# Patient Record
Sex: FEMALE | Race: BLACK OR AFRICAN AMERICAN | Employment: UNEMPLOYED | URBAN - METROPOLITAN AREA
[De-identification: names, ages, dates, MRNs, and addresses within clinical notes are randomized per-mention and may not be internally consistent; named-entity substitution may affect disease eponyms.]

---

## 2020-10-24 ENCOUNTER — HOSPITAL ENCOUNTER (EMERGENCY)
Age: 80
Discharge: HOME | End: 2020-10-24
Attending: EMERGENCY MEDICINE
Payer: MEDICARE

## 2020-10-24 ENCOUNTER — APPOINTMENT (OUTPATIENT)
Dept: GENERAL RADIOLOGY | Age: 80
End: 2020-10-24
Payer: MEDICARE

## 2020-10-24 VITALS
RESPIRATION RATE: 15 BRPM | TEMPERATURE: 97.8 F | HEART RATE: 60 BPM | DIASTOLIC BLOOD PRESSURE: 51 MMHG | SYSTOLIC BLOOD PRESSURE: 127 MMHG | OXYGEN SATURATION: 97 %

## 2020-10-24 LAB
-: ABNORMAL
ABSOLUTE EOS #: 0.1 K/UL (ref 0–0.4)
ABSOLUTE IMMATURE GRANULOCYTE: NORMAL K/UL (ref 0–0.3)
ABSOLUTE LYMPH #: 1.4 K/UL (ref 1–4.8)
ABSOLUTE MONO #: 0.4 K/UL (ref 0.1–1.3)
ALBUMIN SERPL-MCNC: 3.8 G/DL (ref 3.5–5.2)
ALBUMIN/GLOBULIN RATIO: ABNORMAL (ref 1–2.5)
ALP BLD-CCNC: 154 U/L (ref 35–104)
ALT SERPL-CCNC: 9 U/L (ref 5–33)
AMORPHOUS: ABNORMAL
ANION GAP SERPL CALCULATED.3IONS-SCNC: 10 MMOL/L (ref 9–17)
AST SERPL-CCNC: 11 U/L
BACTERIA: ABNORMAL
BASOPHILS # BLD: 0 % (ref 0–2)
BASOPHILS ABSOLUTE: 0 K/UL (ref 0–0.2)
BILIRUB SERPL-MCNC: 0.32 MG/DL (ref 0.3–1.2)
BILIRUBIN URINE: NEGATIVE
BUN BLDV-MCNC: 25 MG/DL (ref 8–23)
BUN/CREAT BLD: ABNORMAL (ref 9–20)
CALCIUM SERPL-MCNC: 9.6 MG/DL (ref 8.6–10.4)
CASTS UA: ABNORMAL /LPF
CHLORIDE BLD-SCNC: 104 MMOL/L (ref 98–107)
CO2: 26 MMOL/L (ref 20–31)
COLOR: YELLOW
COMMENT UA: ABNORMAL
CREAT SERPL-MCNC: 1.02 MG/DL (ref 0.5–0.9)
CRYSTALS, UA: ABNORMAL /HPF
DIFFERENTIAL TYPE: NORMAL
EOSINOPHILS RELATIVE PERCENT: 3 % (ref 0–4)
EPITHELIAL CELLS UA: ABNORMAL /HPF
GFR AFRICAN AMERICAN: >60 ML/MIN
GFR NON-AFRICAN AMERICAN: 52 ML/MIN
GFR SERPL CREATININE-BSD FRML MDRD: ABNORMAL ML/MIN/{1.73_M2}
GFR SERPL CREATININE-BSD FRML MDRD: ABNORMAL ML/MIN/{1.73_M2}
GLUCOSE BLD-MCNC: 419 MG/DL (ref 70–99)
GLUCOSE URINE: ABNORMAL
HCT VFR BLD CALC: 42.9 % (ref 36–46)
HEMOGLOBIN: 14.2 G/DL (ref 12–16)
IMMATURE GRANULOCYTES: NORMAL %
KETONES, URINE: NEGATIVE
LEUKOCYTE ESTERASE, URINE: NEGATIVE
LIPASE: 27 U/L (ref 13–60)
LYMPHOCYTES # BLD: 26 % (ref 24–44)
MCH RBC QN AUTO: 28.7 PG (ref 26–34)
MCHC RBC AUTO-ENTMCNC: 33.2 G/DL (ref 31–37)
MCV RBC AUTO: 86.5 FL (ref 80–100)
MONOCYTES # BLD: 7 % (ref 1–7)
MUCUS: ABNORMAL
NITRITE, URINE: NEGATIVE
NRBC AUTOMATED: NORMAL PER 100 WBC
OTHER OBSERVATIONS UA: ABNORMAL
PDW BLD-RTO: 13.8 % (ref 11.5–14.9)
PH UA: 6 (ref 5–8)
PLATELET # BLD: 236 K/UL (ref 150–450)
PLATELET ESTIMATE: NORMAL
PMV BLD AUTO: 8.5 FL (ref 6–12)
POTASSIUM SERPL-SCNC: 3.8 MMOL/L (ref 3.7–5.3)
PROTEIN UA: ABNORMAL
RBC # BLD: 4.96 M/UL (ref 4–5.2)
RBC # BLD: NORMAL 10*6/UL
RBC UA: ABNORMAL /HPF
RENAL EPITHELIAL, UA: ABNORMAL /HPF
SEG NEUTROPHILS: 64 % (ref 36–66)
SEGMENTED NEUTROPHILS ABSOLUTE COUNT: 3.6 K/UL (ref 1.3–9.1)
SODIUM BLD-SCNC: 140 MMOL/L (ref 135–144)
SPECIFIC GRAVITY UA: 1.02 (ref 1–1.03)
TOTAL PROTEIN: 7.8 G/DL (ref 6.4–8.3)
TRICHOMONAS: ABNORMAL
TROPONIN INTERP: NORMAL
TROPONIN T: NORMAL NG/ML
TROPONIN, HIGH SENSITIVITY: 13 NG/L (ref 0–14)
TURBIDITY: CLEAR
URINE HGB: NEGATIVE
UROBILINOGEN, URINE: NORMAL
WBC # BLD: 5.5 K/UL (ref 3.5–11)
WBC # BLD: NORMAL 10*3/UL
WBC UA: ABNORMAL /HPF
YEAST: ABNORMAL

## 2020-10-24 PROCEDURE — 84484 ASSAY OF TROPONIN QUANT: CPT

## 2020-10-24 PROCEDURE — 36415 COLL VENOUS BLD VENIPUNCTURE: CPT

## 2020-10-24 PROCEDURE — 71045 X-RAY EXAM CHEST 1 VIEW: CPT

## 2020-10-24 PROCEDURE — 93005 ELECTROCARDIOGRAM TRACING: CPT | Performed by: EMERGENCY MEDICINE

## 2020-10-24 PROCEDURE — 80053 COMPREHEN METABOLIC PANEL: CPT

## 2020-10-24 PROCEDURE — 81001 URINALYSIS AUTO W/SCOPE: CPT

## 2020-10-24 PROCEDURE — 85025 COMPLETE CBC W/AUTO DIFF WBC: CPT

## 2020-10-24 PROCEDURE — 99283 EMERGENCY DEPT VISIT LOW MDM: CPT

## 2020-10-24 PROCEDURE — 83690 ASSAY OF LIPASE: CPT

## 2020-10-24 PROCEDURE — 82947 ASSAY GLUCOSE BLOOD QUANT: CPT

## 2020-10-24 RX ORDER — INSULIN LISPRO 100 [IU]/ML
INJECTION, SUSPENSION SUBCUTANEOUS
Qty: 5 PEN | Refills: 0 | Status: SHIPPED | OUTPATIENT
Start: 2020-10-24 | End: 2021-11-04 | Stop reason: DRUGHIGH

## 2020-10-24 ASSESSMENT — ENCOUNTER SYMPTOMS
COLOR CHANGE: 0
SHORTNESS OF BREATH: 0
BACK PAIN: 0
EYE PAIN: 0
ABDOMINAL PAIN: 0

## 2020-10-24 NOTE — ED NOTES
Pt is brought to this ER by her daughter from [place], and the pt states it has been 4 or 5 days since she has had any insulin because she is out and was unable to obtain a refill. Pt states her last FSBS > 400 yesterday at her last check. Pt c/o nausea, lightheadedness, and generalized fatigue. Pt arrives A+O x 4, GCS = 15, PMS x 4 intact, eupneic, and PWD. Pt's pulse is bradycardic and regular which she states is normal \"sometimes. \"       Yolanda Gallardo, BARBARA  10/24/20 6189

## 2020-10-24 NOTE — ED PROVIDER NOTES
EMERGENCY DEPARTMENT ENCOUNTER    Pt Name: Dagmar Baltazar  MRN: [id number]  [place] 9/24/1939  Date of evaluation: 10/24/20  CHIEF COMPLAINT       Chief Complaint   Patient presents with    Dizziness     Pt reports no insulin in 4-5 days    Fatigue     HISTORY OF PRESENT ILLNESS   61-year-old female presents with a complaint of dizziness and general weakness. Patient reports that she has a history of diabetes and recently moved here from [place]. Patient states that she ran out of her insulin approximately 4 days ago and has been without her insulin since then. Patient states that she has not establish care with a doctor in the area and has not been able to get insulin since that time. Patient states that she is feeling lightheaded and overall weak, patient states that this happens when her blood sugar gets high  Patient denies other complaints, denies any chest pain, shortness of breath, fevers, chills, abdominal pain, nausea or vomiting, numbness, tingling or extremity weakness. .  Patient endorsed slight cough which she states is chronic. The history is provided by the patient. REVIEW OF SYSTEMS     Review of Systems   Constitutional: Negative for fever. HENT: Negative for congestion and ear pain. Eyes: Negative for pain. Respiratory: Negative for shortness of breath. Cardiovascular: Negative for chest pain, palpitations and leg swelling. Gastrointestinal: Negative for abdominal pain. Genitourinary: Negative for dysuria and flank pain. Musculoskeletal: Negative for back pain. Skin: Negative for color change. Neurological: Positive for dizziness and light-headedness. Negative for numbness and headaches. Psychiatric/Behavioral: Negative for confusion. All other systems reviewed and are negative.     PASTMEDICAL HISTORY     Past Medical History:   Diagnosis Date    Amputation of leg ([facility] [address]     left aka    CAD (coronary artery disease)     CAD (coronary artery disease)     Chronic back pain     Coughing     Depression     Diabetes     Diphtheria     Full dentures     GERD (gastroesophageal reflux disease) 10/10/2013    Glaucoma     ATMIMKSM(552.2)     History of blood transfusion     Hx of blood clots     Hyperlipidemia     Hypertension     IDDM (insulin dependent diabetes mellitus)     Leg pain, bilateral     LONG TERM ANTICOAGULENT USE     Neuropathy     Numbness of toes     Obesity     Osteoarthritis     Peripheral vascular disease (HCC)     Rash, skin     lt arm    Type II or unspecified type diabetes mellitus without mention of complication, not stated as uncontrolled     Wears dentures     Wears glasses     Wears glasses      Past Problem List  Patient Active Problem List   Diagnosis Code    Depression F32.9    Diabetes mellitus, type 2 E11.9    Glaucoma H40.9    CAD s/p CABG 2001, stent June 2013 I25.10    Hypertension I10    Diabetic nephropathy ([facility] [address] E11.21    Diabetic retinopathy (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 [address] Z89.611, Z89.612    CKD (chronic kidney disease), stage III N18.30     SURGICAL HISTORY       Past Surgical History:   Procedure Laterality Date    BYPASS GRAFT  2008    CATARACT REMOVAL  1980    bilateral    CORONARY ANGIOPLASTY      STENT X2    CORONARY ARTERY BYPASS GRAFT  2001    HYSTERECTOMY, TOTAL ABDOMINAL      LEG AMPUTATION THROUGH KNEE Left      CURRENT MEDICATIONS       Discharge Medication List as of 10/24/2020  1:08 PM      CONTINUE these medications which have NOT CHANGED    Details   insulin detemir (LEVEMIR FLEXTOUCH) 100 UNIT/ML injection pen Inject 45 Units into the skin nightly, Disp-1 Pen,R-3Normal      gabapentin (NEURONTIN) 100 MG capsule take 2 capsules by mouth three times a day, Disp-90 capsule,R-3Normal      B-D UF III MINI PEN NEEDLES 31G X 5 MM MISC Disp-60 each,R-3, Normal      Alcohol Swabs (B-D SINGLE USE SWABS REGULAR) PADS Disp-100 each,R-0, Normal      TRUETRACK TEST strip TEST two to three times a day, Disp-100 each,R-3Normal      TRUEPLUS LANCETS 33G MISC Disp-100 each,R-3, Normal      atorvastatin (LIPITOR) 40 MG tablet Take 1 tablet by mouth daily. , Disp-30 tablet, R-3      ondansetron (ZOFRAN ODT) 4 MG disintegrating tablet Take 1 tablet by mouth every 8 hours as needed for Nausea or Vomiting., Disp-30 tablet, R-0      acetaminophen (TYLENOL 8 HOUR) 650 MG CR tablet Take 1 tablet by mouth every 8 hours as needed for Pain., Disp-90 tablet, R-3      Blood Glucose Monitoring Suppl (BLOOD GLUCOSE METER) KIT Disp-1 kit, R-0, PrintTest 2-3 times daily Dx: 250.00  Diabetes Mellitus 2 Insulin Dependent      oxyCODONE-acetaminophen (PERCOCET) 5-325 MG per tablet Take 1 tablet by mouth every 8 hours as needed for Pain. DC Tylenol, Disp-50 tablet, R-0      aspirin (ASPIRIN LOW DOSE) 81 MG EC tablet Take 1 tablet by mouth daily. , Disp-30 tablet, R-3      citalopram (CELEXA) 20 MG tablet Take 1 tablet by mouth daily. , Disp-30 tablet, R-3      metFORMIN (GLUCOPHAGE) 500 MG tablet Take 1 tablet by mouth 2 times daily (with meals). , Disp-60 tablet, R-3      losartan (COZAAR) 100 MG tablet Take 1 tablet by mouth daily. , Disp-30 tablet, R-3      metoprolol (LOPRESSOR) 25 MG tablet Take 0.5 tablets by mouth 2 times daily. , Disp-60 tablet, R-3      furosemide (LASIX) 20 MG tablet Take 1 tablet by mouth 2 times daily (with meals). , Disp-60 tablet, R-3      sennosides-docusate sodium (SENOKOT-S) 8.6-50 MG tablet Take 1 tablet by mouth 2 times daily. , Disp-60 tablet, R-0Hold for loose stool      clopidogrel (PLAVIX) 75 MG tablet Take 1 tablet by mouth daily. , Disp-30 tablet, R-3      famotidine (PEPCID) 40 MG tablet Take 1 tablet by mouth daily. , Disp-60 tablet, R-3      amLODIPine AKA   Skin:     General: Skin is warm and dry. Capillary Refill: Capillary refill takes less than 2 seconds. Neurological:      General: No focal deficit present. Mental Status: She is alert. Psychiatric:         Mood and Affect: Mood normal.         MEDICAL DECISION MAKIN-year-old female presents with complaint of weakness and lightheadedness. Initial exam patient is in no acute distress, vital signs are stable, will obtain labs    Labs reviewed significant for glucose of 419, remaining labs are unremarkable    Patient was reexamined states she is feeling better, states that she has an appointment with a new PCP this week    And no signs of distress, vitals are stable, no signs of DKA or HHS at this time, will provide patient with prescription for her requested insulin and instruct her to follow-up with her PCP as scheduled    Both patient and daughter are agreeable with this plan and comfortable for discharge home, discussed return precautions including any change or worsening of symptoms, patient and daughter voiced understanding will follow up with the PCP and will return to the ED if her symptoms change or worsen    Patient/Guardian was informed of their diagnosis and told to follow up with PCP in 1-3 days. Patient demonstrates understanding and agreement with the plan. They were given the opportunity to ask questions and those questions were answered to the best of our ability with the available information. Patient/Guardian told to return to the ED for any new, worsening, changing or persistent symptoms. This dictation was prepared using [employer] voice recognition software. As a result, errors may have occurred. When identified, these errors have been corrected.  While every attempt is made to correct errors in dictation, errors may still exist.          CRITICAL CARE:       PROCEDURES:    Procedures    DIAGNOSTIC RESULTS   EKG:All EKG's are interpreted by the Emergency Department Physician who either signs or Co-signs this chart in the absence of a cardiologist.        RADIOLOGY:All plain film, CT, MRI, and formal ultrasound images (except ED bedside ultrasound) are read by the radiologist, see reports below, unless otherwisenoted in MDM or here. XR CHEST PORTABLE   Final Result   No acute pulmonary process. LABS: All lab results were reviewed by myself, and all abnormals are listed below. Labs Reviewed   COMPREHENSIVE METABOLIC PANEL W/ REFLEX TO MG FOR LOW K - Abnormal; Notable for the following components:       Result Value    Glucose 419 (*)     BUN 25 (*)     CREATININE 1.02 (*)     Alkaline Phosphatase 154 (*)     GFR Non- 52 (*)     All other components within normal limits   URINALYSIS - Abnormal; Notable for the following components:    Glucose, Ur 3+ (*)     Protein, UA TRACE (*)     All other components within normal limits   MICROSCOPIC URINALYSIS - Abnormal; Notable for the following components:    Bacteria, UA FEW (*)     All other components within normal limits   CBC WITH AUTO DIFFERENTIAL   LIPASE   TROPONIN       EMERGENCY DEPARTMENTCOURSE:         Vitals:    Vitals:    10/24/20 0954 10/24/20 1055   BP: (!) 169/61 (!) 127/51   Pulse: 59 60   Resp: 14 15   Temp: 97.8 °F (36.6 °C)    TempSrc: Oral    SpO2: 98% 97%       The patient was given the following medications while in the emergency department:  Orders Placed This Encounter   Medications    insulin lispro protamine & lispro (HUMALOG MIX 75/25 KWIKPEN) (75-25) 100 UNIT per ML SUPN injection pen     Sig: Inject 45 units under the skin in the morning and 35 units at night     Dispense:  5 pen     Refill:  0     CONSULTS:  None    FINAL IMPRESSION      1. Hyperglycemia    2.  Diabetes mellitus, type 2          DISPOSITION/PLAN   DISPOSITION Decision To Discharge 10/24/2020 01:08:34 PM      PATIENT REFERRED TO:  [address] [facility] ED  [facility] [address]  [phone]    As needed, If symptoms worsen    Your PCP    Go to   Go to your scheduled appointment    DISCHARGE MEDICATIONS:  Discharge Medication List as of 10/24/2020  1:08 PM      START taking these medications    Details   insulin lispro protamine & lispro (HUMALOG MIX 75/25 KWIKPEN) (75-25) 100 UNIT per ML SUPN injection pen Inject 45 units under the skin in the morning and 35 units at night, Disp-5 pen,R-0Print           Sandra Ramírez DO  Attending Emergency Physician                  Sandra Ramírez DO  10/24/20 7461

## 2020-10-26 LAB — GLUCOSE BLD-MCNC: 327 MG/DL (ref 65–105)

## 2020-10-27 LAB
EKG ATRIAL RATE: 57 BPM
EKG P AXIS: 39 DEGREES
EKG P-R INTERVAL: 194 MS
EKG Q-T INTERVAL: 436 MS
EKG QRS DURATION: 88 MS
EKG QTC CALCULATION (BAZETT): 424 MS
EKG R AXIS: -19 DEGREES
EKG T AXIS: 2 DEGREES
EKG VENTRICULAR RATE: 57 BPM

## 2020-10-27 PROCEDURE — 93010 ELECTROCARDIOGRAM REPORT: CPT | Performed by: INTERNAL MEDICINE

## 2021-10-21 ENCOUNTER — HOSPITAL ENCOUNTER (INPATIENT)
Age: 81
LOS: 4 days | Discharge: HOME | DRG: 687 | End: 2021-10-25
Attending: EMERGENCY MEDICINE | Admitting: INTERNAL MEDICINE
Payer: MEDICARE

## 2021-10-21 ENCOUNTER — APPOINTMENT (OUTPATIENT)
Dept: CT IMAGING | Age: 81
DRG: 687 | End: 2021-10-21
Payer: MEDICARE

## 2021-10-21 DIAGNOSIS — Z89.611 S/P AKA (ABOVE KNEE AMPUTATION) BILATERAL (HCC): ICD-10-CM

## 2021-10-21 DIAGNOSIS — Z89.612 S/P AKA (ABOVE KNEE AMPUTATION) BILATERAL (HCC): ICD-10-CM

## 2021-10-21 DIAGNOSIS — R10.9 ABDOMINAL PAIN: ICD-10-CM

## 2021-10-21 DIAGNOSIS — N28.89 LEFT RENAL MASS: Primary | ICD-10-CM

## 2021-10-21 LAB
-: ABNORMAL
ABSOLUTE EOS #: 0.1 K/UL (ref 0–0.4)
ABSOLUTE IMMATURE GRANULOCYTE: NORMAL K/UL (ref 0–0.3)
ABSOLUTE LYMPH #: 1.7 K/UL (ref 1–4.8)
ABSOLUTE MONO #: 0.4 K/UL (ref 0.1–1.3)
AMORPHOUS: ABNORMAL
ANION GAP SERPL CALCULATED.3IONS-SCNC: 12 MMOL/L (ref 9–17)
BACTERIA: ABNORMAL
BASOPHILS # BLD: 1 % (ref 0–2)
BASOPHILS ABSOLUTE: 0 K/UL (ref 0–0.2)
BILIRUBIN URINE: NEGATIVE
BUN BLDV-MCNC: 24 MG/DL (ref 8–23)
BUN/CREAT BLD: ABNORMAL (ref 9–20)
CALCIUM SERPL-MCNC: 10.6 MG/DL (ref 8.6–10.4)
CASTS UA: ABNORMAL /LPF
CHLORIDE BLD-SCNC: 105 MMOL/L (ref 98–107)
CO2: 27 MMOL/L (ref 20–31)
COLOR: YELLOW
COMMENT UA: ABNORMAL
CREAT SERPL-MCNC: 0.94 MG/DL (ref 0.5–0.9)
CRYSTALS, UA: ABNORMAL /HPF
DIFFERENTIAL TYPE: NORMAL
EOSINOPHILS RELATIVE PERCENT: 2 % (ref 0–4)
EPITHELIAL CELLS UA: ABNORMAL /HPF
GFR AFRICAN AMERICAN: >60 ML/MIN
GFR NON-AFRICAN AMERICAN: 57 ML/MIN
GFR SERPL CREATININE-BSD FRML MDRD: ABNORMAL ML/MIN/{1.73_M2}
GFR SERPL CREATININE-BSD FRML MDRD: ABNORMAL ML/MIN/{1.73_M2}
GLUCOSE BLD-MCNC: 120 MG/DL (ref 70–99)
GLUCOSE BLD-MCNC: 69 MG/DL (ref 65–105)
GLUCOSE URINE: NEGATIVE
HCT VFR BLD CALC: 43.2 % (ref 36–46)
HEMOGLOBIN: 14.4 G/DL (ref 12–16)
IMMATURE GRANULOCYTES: NORMAL %
KETONES, URINE: NEGATIVE
LEUKOCYTE ESTERASE, URINE: NEGATIVE
LYMPHOCYTES # BLD: 27 % (ref 24–44)
MCH RBC QN AUTO: 29.2 PG (ref 26–34)
MCHC RBC AUTO-ENTMCNC: 33.3 G/DL (ref 31–37)
MCV RBC AUTO: 87.6 FL (ref 80–100)
MONOCYTES # BLD: 6 % (ref 1–7)
MUCUS: ABNORMAL
NITRITE, URINE: NEGATIVE
NRBC AUTOMATED: NORMAL PER 100 WBC
OTHER OBSERVATIONS UA: ABNORMAL
PDW BLD-RTO: 13.5 % (ref 11.5–14.9)
PH UA: 5.5 (ref 5–8)
PLATELET # BLD: 218 K/UL (ref 150–450)
PLATELET ESTIMATE: NORMAL
PMV BLD AUTO: 8.3 FL (ref 6–12)
POTASSIUM SERPL-SCNC: 3.7 MMOL/L (ref 3.7–5.3)
PROTEIN UA: ABNORMAL
RBC # BLD: 4.93 M/UL (ref 4–5.2)
RBC # BLD: NORMAL 10*6/UL
RBC UA: ABNORMAL /HPF
RENAL EPITHELIAL, UA: ABNORMAL /HPF
SEG NEUTROPHILS: 64 % (ref 36–66)
SEGMENTED NEUTROPHILS ABSOLUTE COUNT: 4.1 K/UL (ref 1.3–9.1)
SODIUM BLD-SCNC: 144 MMOL/L (ref 135–144)
SPECIFIC GRAVITY UA: 1.02 (ref 1–1.03)
TRICHOMONAS: ABNORMAL
TURBIDITY: CLEAR
URINE HGB: NEGATIVE
UROBILINOGEN, URINE: NORMAL
WBC # BLD: 6.5 K/UL (ref 3.5–11)
WBC # BLD: NORMAL 10*3/UL
WBC UA: ABNORMAL /HPF
YEAST: ABNORMAL

## 2021-10-21 PROCEDURE — 80048 BASIC METABOLIC PNL TOTAL CA: CPT

## 2021-10-21 PROCEDURE — 2580000003 HC RX 258: Performed by: NURSE PRACTITIONER

## 2021-10-21 PROCEDURE — 36415 COLL VENOUS BLD VENIPUNCTURE: CPT

## 2021-10-21 PROCEDURE — 96374 THER/PROPH/DIAG INJ IV PUSH: CPT

## 2021-10-21 PROCEDURE — 85025 COMPLETE CBC W/AUTO DIFF WBC: CPT

## 2021-10-21 PROCEDURE — 99284 EMERGENCY DEPT VISIT MOD MDM: CPT

## 2021-10-21 PROCEDURE — 96375 TX/PRO/DX INJ NEW DRUG ADDON: CPT

## 2021-10-21 PROCEDURE — 2580000003 HC RX 258: Performed by: EMERGENCY MEDICINE

## 2021-10-21 PROCEDURE — 82947 ASSAY GLUCOSE BLOOD QUANT: CPT

## 2021-10-21 PROCEDURE — 6360000002 HC RX W HCPCS: Performed by: EMERGENCY MEDICINE

## 2021-10-21 PROCEDURE — 1200000000 HC SEMI PRIVATE

## 2021-10-21 PROCEDURE — 74177 CT ABD & PELVIS W/CONTRAST: CPT

## 2021-10-21 PROCEDURE — 6360000002 HC RX W HCPCS: Performed by: NURSE PRACTITIONER

## 2021-10-21 PROCEDURE — 81001 URINALYSIS AUTO W/SCOPE: CPT

## 2021-10-21 PROCEDURE — 74176 CT ABD & PELVIS W/O CONTRAST: CPT

## 2021-10-21 PROCEDURE — 6360000004 HC RX CONTRAST MEDICATION: Performed by: EMERGENCY MEDICINE

## 2021-10-21 RX ORDER — ACETAMINOPHEN 650 MG/1
650 SUPPOSITORY RECTAL EVERY 6 HOURS PRN
Status: DISCONTINUED | OUTPATIENT
Start: 2021-10-21 | End: 2021-10-25 | Stop reason: HOSPADM

## 2021-10-21 RX ORDER — SODIUM CHLORIDE 9 MG/ML
25 INJECTION, SOLUTION INTRAVENOUS PRN
Status: DISCONTINUED | OUTPATIENT
Start: 2021-10-21 | End: 2021-10-25 | Stop reason: HOSPADM

## 2021-10-21 RX ORDER — SODIUM CHLORIDE 0.9 % (FLUSH) 0.9 %
10 SYRINGE (ML) INJECTION PRN
Status: DISCONTINUED | OUTPATIENT
Start: 2021-10-21 | End: 2021-10-25 | Stop reason: HOSPADM

## 2021-10-21 RX ORDER — AMLODIPINE BESYLATE 5 MG/1
5 TABLET ORAL DAILY
Status: ON HOLD | COMMUNITY
End: 2021-10-25 | Stop reason: HOSPADM

## 2021-10-21 RX ORDER — 0.9 % SODIUM CHLORIDE 0.9 %
80 INTRAVENOUS SOLUTION INTRAVENOUS ONCE
Status: COMPLETED | OUTPATIENT
Start: 2021-10-21 | End: 2021-10-21

## 2021-10-21 RX ORDER — VALSARTAN 160 MG/1
160 TABLET ORAL DAILY
Status: ON HOLD | COMMUNITY
End: 2021-10-25 | Stop reason: HOSPADM

## 2021-10-21 RX ORDER — PREGABALIN 75 MG/1
75 CAPSULE ORAL DAILY
COMMUNITY

## 2021-10-21 RX ORDER — MORPHINE SULFATE 2 MG/ML
4 INJECTION, SOLUTION INTRAMUSCULAR; INTRAVENOUS ONCE
Status: COMPLETED | OUTPATIENT
Start: 2021-10-21 | End: 2021-10-21

## 2021-10-21 RX ORDER — HYDROCHLOROTHIAZIDE 12.5 MG/1
12.5 CAPSULE, GELATIN COATED ORAL DAILY
Status: ON HOLD | COMMUNITY
End: 2021-10-25 | Stop reason: HOSPADM

## 2021-10-21 RX ORDER — ONDANSETRON 2 MG/ML
4 INJECTION INTRAMUSCULAR; INTRAVENOUS ONCE
Status: COMPLETED | OUTPATIENT
Start: 2021-10-21 | End: 2021-10-21

## 2021-10-21 RX ORDER — METOPROLOL SUCCINATE 100 MG/1
150 TABLET, EXTENDED RELEASE ORAL DAILY
Status: ON HOLD | COMMUNITY
End: 2021-11-05 | Stop reason: HOSPADM

## 2021-10-21 RX ORDER — ONDANSETRON 4 MG/1
4 TABLET, ORALLY DISINTEGRATING ORAL EVERY 8 HOURS PRN
Status: DISCONTINUED | OUTPATIENT
Start: 2021-10-21 | End: 2021-10-25 | Stop reason: HOSPADM

## 2021-10-21 RX ORDER — SODIUM CHLORIDE 0.9 % (FLUSH) 0.9 %
5-40 SYRINGE (ML) INJECTION EVERY 12 HOURS SCHEDULED
Status: DISCONTINUED | OUTPATIENT
Start: 2021-10-21 | End: 2021-10-25 | Stop reason: HOSPADM

## 2021-10-21 RX ORDER — ONDANSETRON 2 MG/ML
4 INJECTION INTRAMUSCULAR; INTRAVENOUS EVERY 6 HOURS PRN
Status: DISCONTINUED | OUTPATIENT
Start: 2021-10-21 | End: 2021-10-25 | Stop reason: HOSPADM

## 2021-10-21 RX ORDER — ACETAMINOPHEN 325 MG/1
650 TABLET ORAL EVERY 6 HOURS PRN
Status: DISCONTINUED | OUTPATIENT
Start: 2021-10-21 | End: 2021-10-25 | Stop reason: HOSPADM

## 2021-10-21 RX ADMIN — ENOXAPARIN SODIUM 40 MG: 100 INJECTION SUBCUTANEOUS at 22:38

## 2021-10-21 RX ADMIN — MORPHINE SULFATE 4 MG: 2 INJECTION, SOLUTION INTRAMUSCULAR; INTRAVENOUS at 19:24

## 2021-10-21 RX ADMIN — IOPAMIDOL 75 ML: 755 INJECTION, SOLUTION INTRAVENOUS at 17:01

## 2021-10-21 RX ADMIN — ONDANSETRON 4 MG: 2 INJECTION INTRAMUSCULAR; INTRAVENOUS at 19:24

## 2021-10-21 RX ADMIN — SODIUM CHLORIDE, PRESERVATIVE FREE 10 ML: 5 INJECTION INTRAVENOUS at 17:01

## 2021-10-21 RX ADMIN — SODIUM CHLORIDE, PRESERVATIVE FREE 10 ML: 5 INJECTION INTRAVENOUS at 22:38

## 2021-10-21 RX ADMIN — SODIUM CHLORIDE 80 ML: 9 INJECTION, SOLUTION INTRAVENOUS at 17:00

## 2021-10-21 ASSESSMENT — ENCOUNTER SYMPTOMS
COLOR CHANGE: 0
SHORTNESS OF BREATH: 0
BACK PAIN: 0
EYE PAIN: 0
ABDOMINAL PAIN: 0

## 2021-10-21 ASSESSMENT — PAIN SCALES - GENERAL
PAINLEVEL_OUTOF10: 0
PAINLEVEL_OUTOF10: 9
PAINLEVEL_OUTOF10: 8

## 2021-10-21 NOTE — H&P
[address] [facility]     HISTORY AND PHYSICAL EXAMINATION            Date:   10/22/2021  Patientname:  Rakan Cabrera  Date of admission:  10/21/2021  2:36 PM  MRN:   [id number]  Account:  [de-identified]  YOB: 1939  PCP:    No primary care provider on file. Room:   2065/2065-01  Code Status:    Full Code    CHIEF COMPLAINT     Chief Complaint   Patient presents with    Flank Pain     left side x1 month    Dysuria       HISTORY OF PRESENT ILLNESS  (Character, Onset, Location, Duration,  Exacerbating/RelievingFactors, Radiation,   Associated Symptoms, Severity )    Principal Problem:    Left renal mass  Active Problems:    Diabetes mellitus, type 2    CAD s/p CABG 2001, stent June 2013    Hypertension    Diabetic nephropathy ([facility] [address]    PVD s/p Left AKA    S/P AKA (above knee amputation) bilateral ([facility])    CKD (chronic kidney disease), stage III ([facility] [address]  Resolved Problems:    * No resolved hospital problems. *      The patient is a [de-identified] y.o. -American female, with a history of CAD s/p CABG 2001, CKD stage III, DM type II, HTN, and PVD-s/p bilateral BKA, who presents with 1 month history of left flank pain and dysuria. According to patient, she developed right-sided back and flank pain approximately 1 month ago, but after approximately 1 week pain moved to the left side where it has been for the past 2 to 3 weeks. Patient denies previous episode of similar symptoms. Symptoms are associated with dysuria, states that she has pain in her left abdomen just before starting a urine stream and at end of urine stream.  Denies fever, chills, chest pain, cough, nausea, vomiting, and diarrhea. There are no aggravating or alleviating factors. Symptoms are reported as constant and moderate.     PAST MEDICAL HISTORY   Patient  has a past medical history of Amputation of leg ([facility] [address] CAD (coronary artery disease), CAD (coronary artery disease), Chronic back pain, Coughing, Depression, Diabetes, Diphtheria, Full dentures, GERD (gastroesophageal reflux disease), Glaucoma, Headache(784.0), History of blood transfusion, Hx of blood clots, Hyperlipidemia, Hypertension, IDDM (insulin dependent diabetes mellitus), Leg pain, bilateral, LONG TERM ANTICOAGULENT USE, Neuropathy, Numbness of toes, Obesity, Osteoarthritis, Peripheral vascular disease ([facility] [address] Rash, skin, Type II or unspecified type diabetes mellitus without mention of complication, not stated as uncontrolled, Wears dentures, Wears glasses, and Wears glasses. PAST SURGICAL HISTORY    Patient  has a past surgical history that includes Bypass Graft (2008); Hysterectomy, total abdominal; Cataract removal (1980); Coronary artery bypass graft (2001); Coronary angioplasty; and Leg amputation through knee (Left). FAMILY HISTORY    Patient family history includes Cancer in her mother; Heart Disease in her brother and sister; High Blood Pressure in her mother. SOCIAL HISTORY    Patient  reports that she has quit smoking. She has never used smokeless tobacco. She reports that she does not drink alcohol and does not use drugs. HOME MEDICATIONS        Prior to Admission medications    Medication Sig Start Date End Date Taking? Authorizing Provider   insulin lispro protamine & lispro (HUMALOG MIX) (75-25) 100 UNIT per ML SUSP injection vial Inject 35 Units into the skin nightly   Yes Historical Provider, MD   amLODIPine (NORVASC) 5 MG tablet Take 5 mg by mouth daily   Yes Historical Provider, MD   metoprolol succinate (TOPROL XL) 100 MG extended release tablet Take 150 mg by mouth daily   Yes Historical Provider, MD   pregabalin (LYRICA) 75 MG capsule Take 75 mg by mouth daily.    Yes Historical Provider, MD   valsartan (DIOVAN) 160 MG tablet Take 160 mg by mouth daily   Yes Historical Provider, MD   hydroCHLOROthiazide (MICROZIDE) 12.5 MG capsule Take 12.5 mg by mouth daily   Yes Historical Provider, MD   insulin lispro protamine & lispro (HUMALOG MIX 75/25 KWIKPEN) (75-25) 100 UNIT per ML SUPN injection pen Inject 45 units under the skin in the morning and 35 units at night  Patient taking differently: Inject 45 Units into the skin daily (with breakfast) Inject 45 units under the skin in the morning and 35 units at night 10/24/20  Yes Bjorn Alvarado DO   atorvastatin (LIPITOR) 40 MG tablet Take 1 tablet by mouth daily. 3/23/15  Yes Naty Champagne MD   acetaminophen (TYLENOL 8 HOUR) 650 MG CR tablet Take 1 tablet by mouth every 8 hours as needed for Pain. 3/23/15  Yes Naty Champagne MD   aspirin (ASPIRIN LOW DOSE) 81 MG EC tablet Take 1 tablet by mouth daily. 2/3/15  Yes Alexandria Ferrer MD   nitroGLYCERIN (NITROSTAT) 0.3 MG SL tablet Place 1 tablet under the tongue as needed. 9/12/14  Yes Naty Champagne MD   brimonidine-timolol (COMBIGAN) 0.2-0.5 % ophthalmic solution Place 1 drop into both eyes every 12 hours. Yes Historical Provider, MD   latanoprost (XALATAN) 0.005 % ophthalmic solution Place 1 drop into both eyes nightly. Yes Historical Provider, MD EPPERSON UF III MINI PEN NEEDLES 31G X 5 MM MISC use daily 6/18/15   Naty Champagne MD   Alcohol Swabs (ROBIN-BRIA SINGLE USE SWABS REGULAR) PADS use as directed 5/1/15   Bettie Devi MD   TRUETRACK TEST strip TEST two to three times a day 3/26/15   Naty Champagne MD   TRUEPLUS LANCETS 33G MISC use two to three times a day 3/26/15   Naty Champagne MD   Blood Glucose Monitoring Suppl (BLOOD GLUCOSE METER) KIT Test 2-3 times daily Dx: 250.00  Diabetes Mellitus 2 Insulin Dependent 3/23/15   Naty Champagne MD       ALLERGIES      Patient has no known allergies. REVIEW OF SYSTEMS     Review of Systems   Constitutional: Negative for chills, diaphoresis and fever. HENT: Negative for congestion and sore throat. Respiratory: Negative for cough, shortness of breath and wheezing. Cardiovascular: Negative for chest pain, palpitations and leg swelling.    Gastrointestinal: Positive for abdominal pain. Negative for constipation, diarrhea, nausea and vomiting. Genitourinary: Positive for dysuria and flank pain. Negative for frequency and urgency. Musculoskeletal: Positive for back pain. Negative for myalgias. Skin: Negative for rash. Neurological: Negative for dizziness, weakness and headaches. Psychiatric/Behavioral: The patient is not nervous/anxious. PHYSICAL EXAM      BP (!) 136/56   Pulse 56   Temp 98.4 °F (36.9 °C) (Oral)   Resp 20   Ht 5' 5\" (1.651 m) Comment: Patient reported height before bilateral amputations  Wt 164 lb 0.4 oz (74.4 kg)   LMP  (LMP Unknown)   SpO2 95%   BMI 27.29 kg/m²  Body mass index is 27.29 kg/m². Physical Exam  Constitutional:       General: She is not in acute distress. Appearance: She is well-developed. She is not diaphoretic. HENT:      Head: Normocephalic and atraumatic. Eyes:      Conjunctiva/sclera: Conjunctivae normal.      Pupils: Pupils are equal, round, and reactive to light. Neck:      Trachea: No tracheal deviation. Cardiovascular:      Rate and Rhythm: Normal rate and regular rhythm. Heart sounds: Normal heart sounds. No murmur heard. No friction rub. No gallop. Pulmonary:      Effort: Pulmonary effort is normal. No respiratory distress. Breath sounds: Normal breath sounds. No wheezing or rales. Chest:      Chest wall: No tenderness. Abdominal:      General: Bowel sounds are normal. There is no distension. Palpations: Abdomen is soft. Tenderness: There is abdominal tenderness (Left abdomen). There is left CVA tenderness. There is no guarding. Musculoskeletal:         General: No tenderness. Normal range of motion. Cervical back: Normal range of motion and neck supple. Comments: Bilateral AKA   Lymphadenopathy:      Cervical: No cervical adenopathy. Skin:     General: Skin is warm and dry. Coloration: Skin is not pale. Findings: No erythema or rash.    Neurological: Mental Status: She is alert and oriented to person, place, and time. Motor: No seizure activity. Coordination: Coordination normal.   Psychiatric:         Behavior: Behavior normal.         Thought Content: Thought content normal.       DIAGNOSTICS      EKG: None    Labs:  CBC:   Recent Labs     10/21/21  1515 10/22/21  0656   WBC 6.5 7.1   HGB 14.4 14.0    239     BMP:    Recent Labs     10/21/21  1515 10/22/21  0656    147*   K 3.7 3.8    108*   CO2 27 26   BUN 24* 27*   CREATININE 0.94* 1.25*   GLUCOSE 120* 171*     S. Calcium:  Recent Labs     10/22/21  0656   CALCIUM 9.7     S. Ionized Calcium:No results for input(s): IONCA in the last 72 hours. S. Magnesium:No results for input(s): MG in the last 72 hours. S. Phosphorus:No results for input(s): PHOS in the last 72 hours. S. Glucose:  Recent Labs     10/22/21  0152 10/22/21  0747 10/22/21  1107   POCGLU 190* 173* 277*     Glycosylated hemoglobin A1C:   Lab Results   Component Value Date    LABA1C 8.2 01/16/2015     Hepatic: No results for input(s): AST, ALT, ALB, ALKPHOS in the last 72 hours. Invalid input(s):  PROT,  BILITOT,  BILIDIR  CARDIAC ENZY: No results for input(s): CKTOTAL, CKMB, CKMBINDEX, TROPHS, MYOGLOBIN in the last 72 hours. INR: No results for input(s): INR in the last 72 hours. BNP: No results for input(s): PROBNP in the last 72 hours. ABGs: No results for input(s): PH, PCO2, PO2, HCO3, O2SAT in the last 72 hours. Lipids: No results for input(s): CHOL, TRIG, HDL, LDLCALC in the last 72 hours. Invalid input(s): LDL  Pancreatic functions:No results for input(s): LIPASE, AMYLASE in the last 72 hours. Blane Gip: No results for input(s): LACTA in the last 72 hours.   Thyroid functions:   Lab Results   Component Value Date    TSH 3.85 10/18/2014      U/A:  Recent Labs     10/21/21  1614   COLORU Yellow   WBCUA 2 TO 5   RBCUA 0 TO 2   MUCUS NOT REPORTED   BACTERIA FEW*   SPECGRAV 1.016   LEUKOCYTESUR NEGATIVE   GLUCOSEU NEGATIVE   AMORPHOUS NOT REPORTED       Imaging/Diagonstics:     CT ABDOMEN PELVIS WO CONTRAST Additional Contrast? None    Result Date: 10/21/2021  EXAMINATION: CT OF THE ABDOMEN AND PELVIS WITHOUT CONTRAST 10/21/2021 3:42 pm TECHNIQUE: CT of the abdomen and pelvis was performed without the administration of intravenous contrast. Multiplanar reformatted images are provided for review. Dose modulation, iterative reconstruction, and/or weight based adjustment of the mA/kV was utilized to reduce the radiation dose to as low as reasonably achievable. COMPARISON: None. HISTORY: ORDERING SYSTEM PROVIDED HISTORY: flank pain TECHNOLOGIST PROVIDED HISTORY: flank pain Decision Support Exception - unselect if not a suspected or confirmed emergency medical condition->Emergency Medical Condition (MA) Reason for Exam: left flank pain Acuity: Acute Type of Exam: Initial FINDINGS: Lower Chest: The visualized heart and lungs show no acute abnormalities. Organs: Limited evaluation the absence of IV contrast.  Liver, spleen, pancreas, adrenal glands show no significant abnormalities. There is other gallbladder wall calcification or a few tiny gallstones. No nephrolithiasis. Renal vascular calcifications. The left kidney appears somewhat amorphous with loss of the hilar fat. Absence of IV contrast limits evaluation. This may be further assessed with MRI. GI/Bowel: There is limited evaluation due to absence of oral contrast.  No bowel obstruction. Normal appendix. Pelvis: Urinary bladder shows no calculi. Hysterectomy noted. Peritoneum/Retroperitoneum: No ureteric calculus. Amorphous left para-aortic soft tissue density with some stranding not optimally assessed in the absence of IV contrast.  Possibilities include scarring, conglomerate of adenopathy and or periaortic hematoma. IVC filter in place. Bones/Soft Tissues: No acute abnormality of the bones.   The superficial soft tissues show no significant abnormalities. 1. Somewhat amorphous appearance of the left kidney with loss of hilar fat representing a change from prior. Lack of IV contrast limits evaluation. Contrast enhanced CT or MRI may be helpful for further evaluation. 2. Left periaortic amorphous soft tissue extending along the length of the infrarenal aorta about 2.0 x 2.2 cm in maximal AP by transverse dimension. This has developed since 2012. Not well assessed in the absence of IV contrast.Possibilities include scarring, conglomerate of adenopathy and or periaortic hematoma. Further evaluation contrast-enhanced CT may be considered. CT ABDOMEN PELVIS W IV CONTRAST Additional Contrast? None    Addendum Date: 10/21/2021    ADDENDUM: Differential considerations would include primary urothelial carcinoma, involving the left renal pelvis and ureter, with metastatic adenopathy to the retroperitoneum. Result Date: 10/21/2021  EXAMINATION: CT OF THE ABDOMEN AND PELVIS WITH CONTRAST 10/21/2021 10:55 am TECHNIQUE: CT of the abdomen and pelvis was performed with the administration of intravenous contrast. Multiplanar reformatted images are provided for review. Dose modulation, iterative reconstruction, and/or weight based adjustment of the mA/kV was utilized to reduce the radiation dose to as low as reasonably achievable. COMPARISON: CT scan dated October 21, 2021 and 25 October 2012 HISTORY: ORDERING SYSTEM PROVIDED HISTORY: pain TECHNOLOGIST PROVIDED HISTORY: pain Decision Support Exception - unselect if not a suspected or confirmed emergency medical condition->Emergency Medical Condition (MA) Reason for Exam: pain Acuity: Unknown Type of Exam: Unknown Additional signs and symptoms: PT STATES LEFT SIDED PAIN FINDINGS: Lower Chest: Dependent changes are present within the lung bases. Extensive coronary arterial calcifications are present. Organs: The liver, spleen, pancreas, and gallbladder demonstrate no acute abnormality.   Cholelithiasis is present without evidence of cholecystitis. Cortical cyst formation is present on the left kidney. Abnormal soft tissue attenuation is present within the left renal pelvis, surrounding the pelvocaliceal system, extending along the course of the left ureter. .  No hydronephrosis is present. GI/Bowel: Small hiatal hernia is present. Small bowel appears normal without evidence of obstruction. The appendix is normal.  Scattered colonic diverticula are present without evidence of diverticulitis. Pelvis: Urinary bladder is incompletely distended. The uterus is surgically absent. Peritoneum/Retroperitoneum: Inferior vena cava filter is in place. Many of the legs of the filter project beyond the lumen of the inferior vena cava. The inferior vena cava caudal to the filter is small in caliber, with heterogeneous attenuation, suggesting chronic thrombosis. Atherosclerotic changes are present throughout the course of the abdominal aorta. Abnormal soft tissue attenuation is present within the left para-aortic region with loss of the normal fat plane adjacent to the abdominal aorta. Soft tissue measures approximately 2.3 x 2 cm in greatest AP and transverse dimension. The soft tissue attenuation extends caudally within the retroperitoneum, along the course of the left ureter, into the pelvis. Bones/Soft Tissues: No acute osseous abnormality is present. 1. Abnormal soft tissue attenuation encompassing the left pelvocaliceal system, within the left renal pelvis, extending caudally along the course of the ureter, and most prominent within the left para-aortic region. Differential considerations would include lymphoma, retroperitoneal fibrosis, or potentially metastatic adenopathy within the periaortic region. Correlation with urine cytology recommended. If this is negative, CT-guided percutaneous biopsy of the percutaneous soft tissue mass should be considered.      ASSESSMENT  and  PLAN     Principal Problem:    Left

## 2021-10-21 NOTE — PROGRESS NOTES
Medication History completed:    New medications: valsartan, pregabalin, metoprolol succinate, hydrochlorothiazide    Medications discontinued: senna-docusate, percocet, ondansetron ODT, metformin, losartan, Levemir, gabapentin, furosemide, famotidine, clopidogrel, citalopram    Changes to dosing:   Amlodipine changed to 5 mg daily    Stated allergies: NKDA    Other pertinent information: Medications confirmed with [facility].      Thank you,  Shantell Carrasco, PharmD, BCPS  [phone]

## 2021-10-21 NOTE — ED NOTES
Spoke to granddaughter Lena Omalley via phone to return call requesting update after speaking with patient and ok with granddaughter receiving update. Updated on care plan, request call back when patient has a room. [phone].      Prudencio Jorgensen RN  10/21/21 1936

## 2021-10-21 NOTE — ED NOTES
Reports improve on pain after medication. Comfortable at this time. Resting in bed, call light in reach.      Reji Trejo RN  10/21/21 [id number]

## 2021-10-21 NOTE — ED PROVIDER NOTES
EMERGENCY DEPARTMENT ENCOUNTER    Pt Name: Jaky Landaverde  MRN: [id number]  [place] 9/24/1939  Date of evaluation: 10/21/21  CHIEF COMPLAINT       Chief Complaint   Patient presents with    Flank Pain     left side x1 month    Dysuria     HISTORY OF PRESENT ILLNESS   27-year-old female presents for complaint of left flank pain. Patient reports having pain in the left flank over the last month, patient reports is worse in the last couple days, admits associated dysuria and urinary frequency, denies any blood in the urine that she is noticed, denies any associated chest pain, shortness of breath, fevers chills nausea or vomiting. Patient denies any significant history of kidney stone. The history is provided by the patient. REVIEW OF SYSTEMS     Review of Systems   Constitutional: Negative for fever. HENT: Negative for congestion and ear pain. Eyes: Negative for pain. Respiratory: Negative for shortness of breath. Cardiovascular: Negative for chest pain, palpitations and leg swelling. Gastrointestinal: Negative for abdominal pain. Genitourinary: Positive for flank pain. Negative for dysuria. Musculoskeletal: Negative for back pain. Skin: Negative for color change. Neurological: Negative for numbness and headaches. Psychiatric/Behavioral: Negative for confusion. All other systems reviewed and are negative.     PASTMEDICAL HISTORY     Past Medical History:   Diagnosis Date    Amputation of leg ([address]     left aka    CAD (coronary artery disease)     CAD (coronary artery disease)     Chronic back pain     Coughing     Depression     Diabetes     Diphtheria     Full dentures     GERD (gastroesophageal reflux disease) 10/10/2013    Glaucoma     YXNNHUAN(537.4)     History of blood transfusion     Hx of blood clots     Hyperlipidemia     Hypertension     IDDM (insulin dependent diabetes mellitus)     Leg pain, bilateral     LONG TERM ANTICOAGULENT USE     Neuropathy  Numbness of toes     Obesity     Osteoarthritis     Peripheral vascular disease (HCC)     Rash, skin     lt arm    Type II or unspecified type diabetes mellitus without mention of complication, not stated as uncontrolled     Wears dentures     Wears glasses     Wears glasses      Past Problem List  Patient Active Problem List   Diagnosis Code    Depression F32. 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 (HCC) I70.229    S/P AKA (above knee amputation) bilateral ([facility] Z89.611, Z89.612    CKD (chronic kidney disease), stage III ([facility] N18.30    Left renal mass N28.89     SURGICAL HISTORY       Past Surgical History:   Procedure Laterality Date    BYPASS GRAFT  2008    CATARACT REMOVAL  1980    bilateral    CORONARY ANGIOPLASTY      STENT X2    CORONARY ARTERY BYPASS GRAFT  2001    HYSTERECTOMY, TOTAL ABDOMINAL      LEG AMPUTATION THROUGH KNEE Left      CURRENT MEDICATIONS       Current Discharge Medication List      CONTINUE these medications which have NOT CHANGED    Details   amLODIPine (NORVASC) 5 MG tablet Take 5 mg by mouth daily      metoprolol succinate (TOPROL XL) 100 MG extended release tablet Take 150 mg by mouth daily      pregabalin (LYRICA) 75 MG capsule Take 75 mg by mouth daily.       valsartan (DIOVAN) 160 MG tablet Take 160 mg by mouth daily      hydroCHLOROthiazide (MICROZIDE) 12.5 MG capsule Take 12.5 mg by mouth daily      insulin lispro protamine & lispro (HUMALOG MIX 75/25 KWIKPEN) (75-25) 100 UNIT per ML SUPN injection pen Inject 45 units under the skin in the morning and 35 units at night  Qty: 5 pen, Refills: 0      atorvastatin (LIPITOR) 40 MG tablet Take 1 tablet by mouth daily. Qty: 30 tablet, Refills: 3    Associated Diagnoses: CAD (coronary artery disease)      acetaminophen (TYLENOL 8 HOUR) 650 MG CR tablet Take 1 tablet by mouth every 8 hours as needed for Pain. Qty: 90 tablet, Refills: 3    Associated Diagnoses: HA (headache)      aspirin (ASPIRIN LOW DOSE) 81 MG EC tablet Take 1 tablet by mouth daily. Qty: 30 tablet, Refills: 3      nitroGLYCERIN (NITROSTAT) 0.3 MG SL tablet Place 1 tablet under the tongue as needed. Qty: 100 tablet, Refills: 2    Associated Diagnoses: CAD (coronary artery disease)      brimonidine-timolol (COMBIGAN) 0.2-0.5 % ophthalmic solution Place 1 drop into both eyes every 12 hours. latanoprost (XALATAN) 0.005 % ophthalmic solution Place 1 drop into both eyes nightly. B-D UF III MINI PEN NEEDLES 31G X 5 MM MISC use daily  Qty: 60 each, Refills: 3      Alcohol Swabs (B-D SINGLE USE SWABS REGULAR) PADS use as directed  Qty: 100 each, Refills: 0      TRUETRACK TEST strip TEST two to three times a day  Qty: 100 each, Refills: 3      TRUEPLUS LANCETS 33G MISC use two to three times a day  Qty: 100 each, Refills: 3      Blood Glucose Monitoring Suppl (BLOOD GLUCOSE METER) KIT Test 2-3 times daily Dx: 250.00  Diabetes Mellitus 2 Insulin Dependent  Qty: 1 kit, Refills: 0    Associated Diagnoses: Diabetes mellitus, type 2 (HCC)           ALLERGIES     has No Known Allergies. FAMILY HISTORY     She indicated that the status of her mother is unknown. She indicated that the status of her sister is unknown. She indicated that the status of her brother is unknown.      SOCIAL HISTORY       Social History     Tobacco Use    Smoking status: Former Smoker    Smokeless tobacco: Never Used   Substance Use Topics    Alcohol use: No    Drug use: No     PHYSICAL EXAM     INITIAL VITALS: BP (!) 181/74   Pulse 71   Temp 98.1 °F (36.7 °C)   Resp 19   LMP  (LMP Unknown)   SpO2 92%    Physical Exam  Vitals and the provided information at this time. VS stable for transfer. This dictation was prepared using [employer] voice recognition software. CRITICAL CARE:       PROCEDURES:    Procedures    DIAGNOSTIC RESULTS   EKG:All EKG's are interpreted by the Emergency Department Physician who either signs or Co-signs this chart in the absence of a cardiologist.        RADIOLOGY:All plain film, CT, MRI, and formal ultrasound images (except ED bedside ultrasound) are read by the radiologist, see reports below, unless otherwisenoted in MDM or here. CT ABDOMEN PELVIS W IV CONTRAST Additional Contrast? None   Final Result   Addendum 1 of 1   ADDENDUM:   Differential considerations would include primary urothelial carcinoma,   involving the left renal pelvis and ureter, with metastatic adenopathy to    the   retroperitoneum. Final   1. Abnormal soft tissue attenuation encompassing the left pelvocaliceal   system, within the left renal pelvis, extending caudally along the course of   the ureter, and most prominent within the left para-aortic region. Differential considerations would include lymphoma, retroperitoneal fibrosis,   or potentially metastatic adenopathy within the periaortic region. Correlation with urine cytology recommended. If this is negative, CT-guided   percutaneous biopsy of the percutaneous soft tissue mass should be considered. CT ABDOMEN PELVIS WO CONTRAST Additional Contrast? None   Final Result   1. Somewhat amorphous appearance of the left kidney with loss of hilar fat   representing a change from prior. Lack of IV contrast limits evaluation. Contrast enhanced CT or MRI may be helpful for further evaluation. 2. Left periaortic amorphous soft tissue extending along the length of the   infrarenal aorta about 2.0 x 2.2 cm in maximal AP by transverse dimension. This has developed since 2012.   Not well assessed in the absence of IV   contrast.Possibilities include scarring, conglomerate of adenopathy and or   periaortic hematoma. Further evaluation contrast-enhanced CT may be   considered. LABS: All lab results were reviewed by myself, and all abnormals are listed below.   Labs Reviewed   URINE RT REFLEX TO CULTURE - Abnormal; Notable for the following components:       Result Value    Protein, UA 2+ (*)     All other components within normal limits   BASIC METABOLIC PANEL W/ REFLEX TO MG FOR LOW K - Abnormal; Notable for the following components:    Glucose 120 (*)     BUN 24 (*)     CREATININE 0.94 (*)     Calcium 10.6 (*)     GFR Non- 57 (*)     All other components within normal limits   MICROSCOPIC URINALYSIS - Abnormal; Notable for the following components:    Bacteria, UA FEW (*)     All other components within normal limits   CBC WITH AUTO DIFFERENTIAL   BASIC METABOLIC PANEL W/ REFLEX TO MG FOR LOW K   CBC       EMERGENCY DEPARTMENTCOURSE:         Vitals:    Vitals:    10/21/21 1336 10/21/21 1615 10/21/21 1945 10/21/21 2055   BP: (!) 185/65 (!) 146/56 (!) 141/68 (!) 181/74   Pulse: 69 74 71 71   Resp: 16 16 14 19   Temp: 97.4 °F (36.3 °C)   98.1 °F (36.7 °C)   TempSrc: Temporal      SpO2: 97% 97% 98% 92%       The patient was given the following medications while in the emergency department:  Orders Placed This Encounter   Medications    0.9 % sodium chloride bolus    sodium chloride flush 0.9 % injection 10 mL    iopamidol (ISOVUE-370) 76 % injection 75 mL    morphine (PF) injection 4 mg    ondansetron (ZOFRAN) injection 4 mg    sodium chloride flush 0.9 % injection 5-40 mL    0.9 % sodium chloride infusion    DISCONTD: enoxaparin (LOVENOX) injection 40 mg    OR Linked Order Group     ondansetron (ZOFRAN-ODT) disintegrating tablet 4 mg     ondansetron (ZOFRAN) injection 4 mg    magnesium hydroxide (MILK OF MAGNESIA) 400 MG/5ML suspension 30 mL    OR Linked Order Group     acetaminophen (TYLENOL) tablet 650 mg     acetaminophen (TYLENOL) suppository 650 mg    enoxaparin (LOVENOX) injection 40 mg     CONSULTS:  None    FINAL IMPRESSION      1. Left renal mass          DISPOSITION/PLAN   DISPOSITION Admitted 10/21/2021 07:43:39 PM      PATIENT REFERRED TO:  No follow-up provider specified. DISCHARGE MEDICATIONS:  Current Discharge Medication List        The care is provided during an unprecedented national emergency due to the novel coronavirus, COVID 19.   Bacilio Kim DO  Attending Emergency Physician                  Bacilio Kim DO  10/21/21 3881

## 2021-10-21 NOTE — ED TRIAGE NOTES
Mode of arrival (squad #, walk in, police, etc) : wheelchair        Chief complaint(s): falnk pain        Arrival Note (brief scenario, treatment PTA, etc). : Pt reports left sided flank pain x1 month. Pt reports pain with urination. Pt denies chest pain or SOB. C= \"Have you ever felt that you should Cut down on your drinking? \"  No  A= \"Have people Annoyed you by criticizing your drinking? \"  No  G= \"Have you ever felt bad or Guilty about your drinking? \"  No  E= \"Have you ever had a drink as an Eye-opener first thing in the morning to steady your nerves or to help a hangover? \"  No      Deferred []      Reason for deferring: N/A    *If yes to two or more: probable alcohol abuse. *

## 2021-10-22 LAB
ANION GAP SERPL CALCULATED.3IONS-SCNC: 13 MMOL/L (ref 9–17)
BUN BLDV-MCNC: 27 MG/DL (ref 8–23)
BUN/CREAT BLD: ABNORMAL (ref 9–20)
CALCIUM SERPL-MCNC: 9.7 MG/DL (ref 8.6–10.4)
CHLORIDE BLD-SCNC: 108 MMOL/L (ref 98–107)
CO2: 26 MMOL/L (ref 20–31)
CREAT SERPL-MCNC: 1.25 MG/DL (ref 0.5–0.9)
GFR AFRICAN AMERICAN: 50 ML/MIN
GFR NON-AFRICAN AMERICAN: 41 ML/MIN
GFR SERPL CREATININE-BSD FRML MDRD: ABNORMAL ML/MIN/{1.73_M2}
GFR SERPL CREATININE-BSD FRML MDRD: ABNORMAL ML/MIN/{1.73_M2}
GLUCOSE BLD-MCNC: 171 MG/DL (ref 70–99)
GLUCOSE BLD-MCNC: 173 MG/DL (ref 65–105)
GLUCOSE BLD-MCNC: 190 MG/DL (ref 65–105)
GLUCOSE BLD-MCNC: 270 MG/DL (ref 65–105)
GLUCOSE BLD-MCNC: 277 MG/DL (ref 65–105)
GLUCOSE BLD-MCNC: 99 MG/DL (ref 65–105)
HCT VFR BLD CALC: 41.8 % (ref 36–46)
HEMOGLOBIN: 14 G/DL (ref 12–16)
MCH RBC QN AUTO: 28.9 PG (ref 26–34)
MCHC RBC AUTO-ENTMCNC: 33.4 G/DL (ref 31–37)
MCV RBC AUTO: 86.4 FL (ref 80–100)
NRBC AUTOMATED: NORMAL PER 100 WBC
PDW BLD-RTO: 13.8 % (ref 11.5–14.9)
PLATELET # BLD: 239 K/UL (ref 150–450)
PMV BLD AUTO: 8.3 FL (ref 6–12)
POTASSIUM SERPL-SCNC: 3.8 MMOL/L (ref 3.7–5.3)
RBC # BLD: 4.84 M/UL (ref 4–5.2)
SODIUM BLD-SCNC: 147 MMOL/L (ref 135–144)
WBC # BLD: 7.1 K/UL (ref 3.5–11)

## 2021-10-22 PROCEDURE — 80048 BASIC METABOLIC PNL TOTAL CA: CPT

## 2021-10-22 PROCEDURE — 99223 1ST HOSP IP/OBS HIGH 75: CPT | Performed by: INTERNAL MEDICINE

## 2021-10-22 PROCEDURE — 97166 OT EVAL MOD COMPLEX 45 MIN: CPT

## 2021-10-22 PROCEDURE — 2580000003 HC RX 258: Performed by: NURSE PRACTITIONER

## 2021-10-22 PROCEDURE — 97530 THERAPEUTIC ACTIVITIES: CPT

## 2021-10-22 PROCEDURE — 6360000002 HC RX W HCPCS: Performed by: NURSE PRACTITIONER

## 2021-10-22 PROCEDURE — 1200000000 HC SEMI PRIVATE

## 2021-10-22 PROCEDURE — 6370000000 HC RX 637 (ALT 250 FOR IP): Performed by: NURSE PRACTITIONER

## 2021-10-22 PROCEDURE — 82947 ASSAY GLUCOSE BLOOD QUANT: CPT

## 2021-10-22 PROCEDURE — 85027 COMPLETE CBC AUTOMATED: CPT

## 2021-10-22 PROCEDURE — 36415 COLL VENOUS BLD VENIPUNCTURE: CPT

## 2021-10-22 RX ORDER — ASPIRIN 81 MG/1
81 TABLET ORAL DAILY
Status: DISCONTINUED | OUTPATIENT
Start: 2021-10-22 | End: 2021-10-25 | Stop reason: HOSPADM

## 2021-10-22 RX ORDER — ATORVASTATIN CALCIUM 40 MG/1
40 TABLET, FILM COATED ORAL DAILY
Status: DISCONTINUED | OUTPATIENT
Start: 2021-10-22 | End: 2021-10-25 | Stop reason: HOSPADM

## 2021-10-22 RX ORDER — NICOTINE POLACRILEX 4 MG
15 LOZENGE BUCCAL PRN
Status: DISCONTINUED | OUTPATIENT
Start: 2021-10-22 | End: 2021-10-25 | Stop reason: HOSPADM

## 2021-10-22 RX ORDER — INSULIN GLARGINE 100 [IU]/ML
48 INJECTION, SOLUTION SUBCUTANEOUS DAILY
Status: DISCONTINUED | OUTPATIENT
Start: 2021-10-22 | End: 2021-10-25 | Stop reason: HOSPADM

## 2021-10-22 RX ORDER — MORPHINE SULFATE 2 MG/ML
4 INJECTION, SOLUTION INTRAMUSCULAR; INTRAVENOUS EVERY 4 HOURS PRN
Status: DISCONTINUED | OUTPATIENT
Start: 2021-10-22 | End: 2021-10-24

## 2021-10-22 RX ORDER — DEXTROSE MONOHYDRATE 25 G/50ML
12.5 INJECTION, SOLUTION INTRAVENOUS PRN
Status: DISCONTINUED | OUTPATIENT
Start: 2021-10-22 | End: 2021-10-25 | Stop reason: HOSPADM

## 2021-10-22 RX ORDER — BRIMONIDINE TARTRATE 2 MG/ML
1 SOLUTION/ DROPS OPHTHALMIC 2 TIMES DAILY
Status: DISCONTINUED | OUTPATIENT
Start: 2021-10-22 | End: 2021-10-25 | Stop reason: HOSPADM

## 2021-10-22 RX ORDER — LATANOPROST 50 UG/ML
1 SOLUTION/ DROPS OPHTHALMIC NIGHTLY
Status: DISCONTINUED | OUTPATIENT
Start: 2021-10-22 | End: 2021-10-25 | Stop reason: HOSPADM

## 2021-10-22 RX ORDER — NITROGLYCERIN 0.4 MG/1
0.4 TABLET SUBLINGUAL EVERY 5 MIN PRN
Refills: 2 | Status: DISCONTINUED | OUTPATIENT
Start: 2021-10-22 | End: 2021-10-25 | Stop reason: HOSPADM

## 2021-10-22 RX ORDER — PREGABALIN 75 MG/1
75 CAPSULE ORAL DAILY
Status: DISCONTINUED | OUTPATIENT
Start: 2021-10-22 | End: 2021-10-25 | Stop reason: HOSPADM

## 2021-10-22 RX ORDER — AMLODIPINE BESYLATE 5 MG/1
5 TABLET ORAL DAILY
Status: DISCONTINUED | OUTPATIENT
Start: 2021-10-22 | End: 2021-10-25 | Stop reason: HOSPADM

## 2021-10-22 RX ORDER — MORPHINE SULFATE 2 MG/ML
2 INJECTION, SOLUTION INTRAMUSCULAR; INTRAVENOUS EVERY 4 HOURS PRN
Status: DISCONTINUED | OUTPATIENT
Start: 2021-10-22 | End: 2021-10-24

## 2021-10-22 RX ORDER — VALSARTAN 320 MG/1
160 TABLET ORAL DAILY
Status: DISCONTINUED | OUTPATIENT
Start: 2021-10-22 | End: 2021-10-25 | Stop reason: HOSPADM

## 2021-10-22 RX ORDER — DEXTROSE MONOHYDRATE 50 MG/ML
100 INJECTION, SOLUTION INTRAVENOUS PRN
Status: DISCONTINUED | OUTPATIENT
Start: 2021-10-22 | End: 2021-10-25 | Stop reason: HOSPADM

## 2021-10-22 RX ORDER — BRIMONIDINE TARTRATE, TIMOLOL MALEATE 2; 5 MG/ML; MG/ML
1 SOLUTION/ DROPS OPHTHALMIC EVERY 12 HOURS
Status: DISCONTINUED | OUTPATIENT
Start: 2021-10-22 | End: 2021-10-22 | Stop reason: SDUPTHER

## 2021-10-22 RX ORDER — TIMOLOL MALEATE 5 MG/ML
1 SOLUTION/ DROPS OPHTHALMIC 2 TIMES DAILY
Status: DISCONTINUED | OUTPATIENT
Start: 2021-10-22 | End: 2021-10-25 | Stop reason: HOSPADM

## 2021-10-22 RX ORDER — HYDROCHLOROTHIAZIDE 12.5 MG/1
12.5 CAPSULE, GELATIN COATED ORAL DAILY
Status: DISCONTINUED | OUTPATIENT
Start: 2021-10-22 | End: 2021-10-25 | Stop reason: HOSPADM

## 2021-10-22 RX ADMIN — AMLODIPINE BESYLATE 5 MG: 5 TABLET ORAL at 09:36

## 2021-10-22 RX ADMIN — METOPROLOL SUCCINATE 150 MG: 100 TABLET, EXTENDED RELEASE ORAL at 09:33

## 2021-10-22 RX ADMIN — INSULIN LISPRO 6 UNITS: 100 INJECTION, SOLUTION INTRAVENOUS; SUBCUTANEOUS at 11:21

## 2021-10-22 RX ADMIN — TIMOLOL MALEATE 1 DROP: 5 SOLUTION OPHTHALMIC at 21:42

## 2021-10-22 RX ADMIN — ENOXAPARIN SODIUM 40 MG: 100 INJECTION SUBCUTANEOUS at 09:35

## 2021-10-22 RX ADMIN — INSULIN LISPRO 7 UNITS: 100 INJECTION, SOLUTION INTRAVENOUS; SUBCUTANEOUS at 16:53

## 2021-10-22 RX ADMIN — HYDROCHLOROTHIAZIDE 12.5 MG: 12.5 CAPSULE ORAL at 09:33

## 2021-10-22 RX ADMIN — INSULIN LISPRO 6 UNITS: 100 INJECTION, SOLUTION INTRAVENOUS; SUBCUTANEOUS at 16:52

## 2021-10-22 RX ADMIN — LATANOPROST 1 DROP: 50 SOLUTION OPHTHALMIC at 21:42

## 2021-10-22 RX ADMIN — VALSARTAN 160 MG: 320 TABLET, FILM COATED ORAL at 09:34

## 2021-10-22 RX ADMIN — SODIUM CHLORIDE, PRESERVATIVE FREE 10 ML: 5 INJECTION INTRAVENOUS at 21:42

## 2021-10-22 RX ADMIN — PREGABALIN 75 MG: 75 CAPSULE ORAL at 09:34

## 2021-10-22 RX ADMIN — INSULIN GLARGINE 48 UNITS: 100 INJECTION, SOLUTION SUBCUTANEOUS at 09:50

## 2021-10-22 RX ADMIN — BRIMONIDINE TARTRATE 1 DROP: 2 SOLUTION OPHTHALMIC at 21:42

## 2021-10-22 RX ADMIN — ATORVASTATIN CALCIUM 40 MG: 40 TABLET, FILM COATED ORAL at 09:50

## 2021-10-22 RX ADMIN — MORPHINE SULFATE 4 MG: 2 INJECTION, SOLUTION INTRAMUSCULAR; INTRAVENOUS at 01:46

## 2021-10-22 RX ADMIN — INSULIN LISPRO 7 UNITS: 100 INJECTION, SOLUTION INTRAVENOUS; SUBCUTANEOUS at 11:22

## 2021-10-22 RX ADMIN — INSULIN LISPRO 1 UNITS: 100 INJECTION, SOLUTION INTRAVENOUS; SUBCUTANEOUS at 01:57

## 2021-10-22 ASSESSMENT — ENCOUNTER SYMPTOMS
SORE THROAT: 0
WHEEZING: 0
COUGH: 0
CONSTIPATION: 0
VOMITING: 0
SHORTNESS OF BREATH: 0
NAUSEA: 0
ABDOMINAL PAIN: 1
DIARRHEA: 0
BACK PAIN: 1

## 2021-10-22 ASSESSMENT — PAIN SCALES - GENERAL
PAINLEVEL_OUTOF10: 9
PAINLEVEL_OUTOF10: 0

## 2021-10-22 NOTE — PLAN OF CARE
Problem: Skin Integrity:  Goal: Will show no infection signs and symptoms  Description: Will show no infection signs and symptoms  Outcome: Ongoing  Goal: Absence of new skin breakdown  Description: Absence of new skin breakdown  Outcome: Ongoing  Note: Assess the overall condition of the skin. Check on bony prominences such as the sacrum, trochanters, scapulae, elbows, heels, inner and outer malleolus, inner and outer knees, back of head). Reinforce the importance of turning, mobility, and ambulation. Problem: Falls - Risk of:  Goal: Will remain free from falls  Description: Will remain free from falls  Outcome: Ongoing  Note: Patient remains free of falls and injuries throughout shift. Bed remains in the lowest position, wheels locked, call light and bedside table are within reach.    Goal: Absence of physical injury  Description: Absence of physical injury  Outcome: Ongoing

## 2021-10-22 NOTE — ED NOTES
Attempt to call granddaughter to update on room number, no answer. Patient informed.      Jamal Sultana RN  10/21/21 2008

## 2021-10-22 NOTE — ED NOTES
Spoke to granddaughter Delia Glass via phone to provide update on admission and room number.      Kobe Godwin RN  10/21/21 2039

## 2021-10-22 NOTE — CARE COORDINATION
CASE MANAGEMENT NOTE:    Admission Date:  10/21/2021 Damaris Byers is a [de-identified] y.o.  female    Admitted for : Left renal mass [N28.89]    Met with:  Patient    PCP:  Has one at [facility], but would like set up at [facility]. Insurance:  [facility] Medicare      Is patient alert and oriented at time of discussion:  Yes    Current Residence/ Living Arrangements:  independently at home alone            Current Services PTA:  No    Does patient go to outpatient dialysis: No  If yes, location and chair time: n/a    Is patient agreeable to VNS: Yes    Freedom of choice provided:  Yes    List of [address] provided: Yes    VNS chosen:  [address] [place] [address], referral faxed and notified Marianela Reta from [facility] of referral.    DME:  Wheelchair (Bilateral BKA), shower chair and glucometer    Home Oxygen: No    Nebulizer: No    CPAP/BIPAP: No    Supplier: N/A    Potential Assistance Needed: Yes    SNF needed: No    Freedom of choice and list provided: NA    Pharmacy:  [facility].       Does Patient want to use MEDS to BEDS? No    Is patient currently receiving oral anticoagulation therapy? No    Is the Patient an [facility] with Readmission Risk Score greater than 14%? No  If yes, pt needs a follow up appointment made within 7 days. Family Members/Caregivers that pt would like involved in their care:    Yes    If yes, list name here:  Blair Tellez and Luis A Alanis    Transportation Provider:  Family and medical van             Discharge Plan:  Home with VNS - [address] [place] [address]. Electronically signed by: Miranda Moralez RN on 10/22/2021 at 12:30 PM     Received call from Lavon Chase at [address] [place] [address] stating they are able to accept patient.     Electronically signed by Miranda Moralez RN on 10/22/2021 at 1:28 PM

## 2021-10-22 NOTE — CONSULTS
Urology Consultation    Patient:  Harper Patient  MRN: [id number]  YOB: 1939    CHIEF COMPLAINT:  Left renal mass    HISTORY OF PRESENT ILLNESS:   The patient is a [de-identified] y.o. female who presents with left sided flank pain and dysuria. She has been having pain for about 1 month. She initially had right sided pain, but then the pain moved to the left. She has not had any fevers. She had hematuria about 5 years ago. She had a CT, which showed a possible left renal mass. Patient's old records, notes and chart reviewed and summarized above.     Past Medical History:    Past Medical History:   Diagnosis Date    Amputation of leg ([address]     left aka    CAD (coronary artery disease)     CAD (coronary artery disease)     Chronic back pain     Coughing     Depression     Diabetes     Diphtheria     Full dentures     GERD (gastroesophageal reflux disease) 10/10/2013    Glaucoma     XPZCICAB(503.5)     History of blood transfusion     Hx of blood clots     Hyperlipidemia     Hypertension     IDDM (insulin dependent diabetes mellitus)     Leg pain, bilateral     LONG TERM ANTICOAGULENT USE     Neuropathy     Numbness of toes     Obesity     Osteoarthritis     Peripheral vascular disease (HCC)     Rash, skin     lt arm    Type II or unspecified type diabetes mellitus without mention of complication, not stated as uncontrolled     Wears dentures     Wears glasses     Wears glasses        Past Surgical History:    Past Surgical History:   Procedure Laterality Date    BYPASS GRAFT  2008    CATARACT REMOVAL  1980    bilateral    CORONARY ANGIOPLASTY      STENT X2    CORONARY ARTERY BYPASS GRAFT  2001    HYSTERECTOMY, TOTAL ABDOMINAL      LEG AMPUTATION THROUGH KNEE Left      Previous  surgery: none   Medications:    Scheduled Meds:   amLODIPine  5 mg Oral Daily    [Held by provider] aspirin  81 mg Oral Daily    atorvastatin  40 mg Oral Daily    latanoprost  1 drop Both Eyes Nightly    metoprolol succinate  150 mg Oral Daily    pregabalin  75 mg Oral Daily    valsartan  160 mg Oral Daily    hydroCHLOROthiazide  12.5 mg Oral Daily    insulin lispro  0-12 Units SubCUTAneous TID WC    insulin lispro  0-6 Units SubCUTAneous Nightly    brimonidine  1 drop Both Eyes BID    And    timolol  1 drop Both Eyes BID    insulin glargine  48 Units SubCUTAneous Daily    insulin lispro  7 Units SubCUTAneous TID     sodium chloride flush  5-40 mL IntraVENous 2 times per day    enoxaparin  40 mg SubCUTAneous Daily     Continuous Infusions:   dextrose      sodium chloride       PRN Meds:.morphine **OR** morphine, nitroGLYCERIN, glucose, dextrose, glucagon (rDNA), dextrose, sodium chloride flush, sodium chloride, ondansetron **OR** ondansetron, magnesium hydroxide, acetaminophen **OR** acetaminophen    Allergies:  Patient has no known allergies. Social History:    Social History     Socioeconomic History    Marital status: Single     Spouse name: Not on file    Number of children: Not on file    Years of education: Not on file    Highest education level: Not on file   Occupational History    Not on file   Tobacco Use    Smoking status: Former Smoker    Smokeless tobacco: Never Used   Substance and Sexual Activity    Alcohol use: No    Drug use: No    Sexual activity: Not on file   Other Topics Concern    Not on file   Social History Narrative    Not on file     Social Determinants of Health     Financial Resource Strain:     Difficulty of Paying Living Expenses:    Food Insecurity:     Worried About [address] in the Last Year:     [address] in the Last Year:    Transportation Needs:     Lack of Transportation (Medical):      Lack of Transportation (Non-Medical):    Physical Activity:     Days of Exercise per Week:     Minutes of Exercise per Session:    Stress:     Feeling of Stress :    Social Connections:     Frequency of Communication with Friends and Family:     Frequency of Social Gatherings with Friends and Family:     Attends Anabaptism Services:     Active Member of Clubs or Organizations:     Attends Club or Organization Meetings:     Marital Status:    Intimate Partner Violence:     Fear of Current or Ex-Partner:     Emotionally Abused:     Physically Abused:     Sexually Abused:        Family History:    Family History   Problem Relation Age of Onset    Cancer Mother         stomach    High Blood Pressure Mother     Heart Disease Sister     Heart Disease Brother      Previous Urologic Family history: none  REVIEW OF SYSTEMS:  Constitutional: negative  Eyes: negative  Respiratory: negative  Cardiovascular: negative  Gastrointestinal: negative  Genitourinary: see HPI  Musculoskeletal: negative  Skin: negative   Neurological: negative  Hematological/Lymphatic: negative  Psychological: negative    Physical Exam:      This a [de-identified] y.o. female   Patient Vitals for the past 24 hrs:   BP Temp Temp src Pulse Resp SpO2 Height Weight   10/22/21 1428 (!) 116/50 98.2 °F (36.8 °C) -- 63 17 90 % -- --   10/22/21 1100 (!) 136/56 98.4 °F (36.9 °C) Oral 56 20 95 % -- --   10/22/21 1000 (!) 153/60 -- -- 65 -- 91 % -- --   10/22/21 0845 (!) 148/57 -- -- 76 -- 95 % -- --   10/22/21 0700 (!) 153/72 99 °F (37.2 °C) Oral 83 20 96 % -- --   10/22/21 0011 (!) 157/62 97.8 °F (36.6 °C) Oral 78 14 93 % -- --   10/21/21 2055 (!) 181/74 98.1 °F (36.7 °C) Oral 71 19 92 % 5' 5\" (1.651 m) 164 lb 0.4 oz (74.4 kg)   10/21/21 1945 (!) 141/68 -- -- 71 14 98 % -- --   10/21/21 1615 (!) 146/56 -- -- 74 16 97 % -- --     Constitutional: Patient in no acute distress. Neuro: Alert and oriented to person, place and time. Psych: mood and affect normal  HEENT negative  Lungs: Respiratory effort is normal  Cardiovascular: Normal peripheral pulses  Abdomen: Soft, non-tender, non-distended with no CVA, flank pain or hepatosplenomegaly. No hernias.   Kidneys normal.  Lymphatics: No palpable lymphadenopathy. Bladder non-tender and not distended. Pelvic exam: deferred  Rectal exam not indicated    LABS:   Recent Labs     10/21/21  1515 10/22/21  0656   WBC 6.5 7.1   HGB 14.4 14.0   HCT 43.2 41.8   MCV 87.6 86.4    239     Recent Labs     10/21/21  1515 10/22/21  0656    147*   K 3.7 3.8    108*   CO2 27 26   BUN 24* 27*   CREATININE 0.94* 1.25*       Additional Lab/culture results:    Urinalysis:   Recent Labs     10/21/21  1614   COLORU Yellow   PHUR 5.5   WBCUA 2 TO 5   RBCUA 0 TO 2   MUCUS NOT REPORTED   TRICHOMONAS NOT REPORTED   YEAST NOT REPORTED   BACTERIA FEW*   SPECGRAV 1.016   LEUKOCYTESUR NEGATIVE   UROBILINOGEN Normal   BILIRUBINUR NEGATIVE        -----------------------------------------------------------------  Imaging Results:    CT images reviewed. Possible mass noted in left renal pelvis and periaortic mass noted as well. 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 Await urine culture results. Would recommend cysto, left retrograde and possible left ureteroscopy to evaluate lesion. Would need to hold ASA as well prior to surgery.      Radha Rowe MD  3:09 PM 10/22/2021

## 2021-10-22 NOTE — PROGRESS NOTES
Patient arrives to room 2065 via bed. Vitals taken/stable, assessment complete, admission complete. Patient oriented to room, bed in lowest position with wheels locked and side rails up x2, table/call light within reach, no needs expressed at this time.

## 2021-10-22 NOTE — PROGRESS NOTES
[address] [facility]    Occupational Therapy Evaluation  Date: 10/22/21  Patient Name: Stanley Gamboa       Room: 5384/1617-24  MRN: [id number]  Account: [de-identified]   : 1939  ([de-identified] y.o.) Gender: female     Discharge Recommendations: The patient may need non-skilled ADL assistance after discharge. Referring Practitioner: Gudelia Colunga MD  Diagnosis: Left renal mass  Additional Pertinent Hx: patient is a [de-identified] y.o. -American female, with a history of CAD s/p CABG , CKD stage III, DM type II, HTN, and PVD-s/p bilateral BKA, who presents with 1 month history of left flank pain and dysuria. According to patient, she developed right-sided back and flank pain approximately 1 month ago, but after approximately 1 week pain moved to the left side where it has been for the past 2 to 3 weeks. Patient denies previous episode of similar symptoms. Symptoms are associated with dysuria, states that she has pain in her left abdomen just before starting a urine stream and at end of urine stream.      Treatment Diagnosis: Impaired self-care status  Past Medical History:  has a past medical history of Amputation of leg ([address] CAD (coronary artery disease), CAD (coronary artery disease), Chronic back pain, Coughing, Depression, Diabetes, Diphtheria, Full dentures, GERD (gastroesophageal reflux disease), Glaucoma, Headache(784.0), History of blood transfusion, Hx of blood clots, Hyperlipidemia, Hypertension, IDDM (insulin dependent diabetes mellitus), Leg pain, bilateral, LONG TERM ANTICOAGULENT USE, Neuropathy, Numbness of toes, Obesity, Osteoarthritis, Peripheral vascular disease ([address] Rash, skin, Type II or unspecified type diabetes mellitus without mention of complication, not stated as uncontrolled, Wears dentures, Wears glasses, and Wears glasses. Past Surgical History:   has a past surgical history that includes Bypass Graft ();  Hysterectomy, total abdominal; Cataract removal (); Coronary artery bypass graft (2001); Coronary angioplasty; and Leg amputation through knee (Left). Restrictions  Restrictions/Precautions: General Precautions, Fall Risk  Implants present? :  (Cardiac stents)  Other position/activity restrictions: Up as tolerated, B BKA  Required Braces or Orthoses?: No     Vitals  Temp: 98.2 °F (36.8 °C)  Pulse: 63  Resp: 17  BP: (!) 116/50  Height: 5' 5\" (165.1 cm) (Patient reported height before bilateral amputations)  Weight: 164 lb 0.4 oz (74.4 kg)  BMI (Calculated): 27.4  Oxygen Therapy  SpO2: 90 %  Pulse Oximeter Device Mode: Intermittent  Pulse Oximeter Device Location: Finger  O2 Device: None (Room air)  Level of Consciousness: Alert (0)    Subjective  Subjective: \"I'm not in pain, just weak\"   Comments: Okay for OT per RN. Pt pleasant and agreeable for session. Overall Orientation Status: Within Functional Limits  Vision  Vision: Impaired  Vision Exceptions: Wears glasses at all times (Glasses not present)  Hearing  Hearing: Within functional limits  Social/Functional History  Lives With: Alone  [facility]  Home Layout: One level  Home Access: Level entry  Bathroom Shower/Tub: Tub/Shower unit  Bathroom Toilet: Handicap height  Bathroom Equipment: Tub transfer bench, Hand-held shower, Grab bars in shower  Bathroom Accessibility: Wheelchair accessible  Home Equipment: [employer], [address] (Electric w/c broken)  ADL Assistance: Independent  Homemaking Assistance: Independent  Homemaking Responsibilities: Yes  Ambulation Assistance:  (with manual w/c )  Transfer Assistance: Independent (Uses slide board sometimes)  Active : No  Patient's  Info: Public/medical transportation services  IADL Comments: Sleeps on couch, unable to get into bed due to its height  Additional Comments: Niece completes grocery shopping.  Pt reports that she completes transfers from her wheelchair to toilet; w/c to TTB; uses a to urinate. Increase time to complete  Functional Activity Tolerance  Functional Activity Tolerance:  Tolerates 30+ min exercise without fatigue     Assessment  Assessment  Performance deficits / Impairments: Decreased functional mobility , Decreased ADL status, Decreased ROM, Decreased strength, Decreased endurance, Decreased sensation, Decreased high-level IADLs  Treatment Diagnosis: Impaired self-care status  Prognosis: Good  Decision Making: Medium Complexity  REQUIRES OT FOLLOW UP: Yes  Discharge Recommendations: Continue to assess pending progress  Activity Tolerance: Patient Tolerated treatment well         Functional Outcome Measures  AM-PAC Daily Activity Inpatient   How much help for putting on and taking off regular lower body clothing?: A Little  How much help for Bathing?: A Little  How much help for Toileting?: A Little  How much help for putting on and taking off regular upper body clothing?: A Little  How much help for taking care of personal grooming?: A Little  How much help for eating meals?: A Little  AM-[facility] Inpatient Daily Activity Raw Score: 18  AM-PAC Inpatient ADL T-Scale Score : 38.66  ADL Inpatient CMS 0-100% Score: 46.65  ADL Inpatient CMS G-Code Modifier : CK       Goals  Short term goals  Time Frame for Short term goals: By discharge  Short term goal 1: Patient will complete self care with supervision and good safety  Short term goal 2: Patient will perform bed mobility with modified independence and good safety  Short term goal 3: Patient will tolerate sitting 10+ minutes with modified independence and good safety while engaged in self care  Short term goal 4: Patient will perform transfers, using slide board or adaptive strategy, with supervision and good safety  Short term goal 5: Patient will actively participate in 15+ minutes of therapeutic exercise/functional activity to promote independence with self care and mobility    Plan  Safety Devices  Safety Devices in place: Yes  Type of devices:  All fall risk precautions in place, Call light within reach, Gait belt, Patient at risk for falls, Left in bed, Nurse notified     Plan  Times per week: 2-3  Current Treatment Recommendations: Strengthening, ROM, Balance Training, Endurance Training, Safety Education & Training, Patient/Caregiver Education & Training, Equipment Evaluation, Education, & procurement, Self-Care / ADL          OT Individual Minutes  Time In: 4512  Time Out: 0072  Minutes: 28    Electronically signed by Dorene Villafuerte OT on 10/22/21 at 4:38 PM EDT         10/22/21 1636   OT Individual Minutes   Time In 3670   Time Out 4226   IXQMPIV 90

## 2021-10-23 LAB
ANION GAP SERPL CALCULATED.3IONS-SCNC: 11 MMOL/L (ref 9–17)
BUN BLDV-MCNC: 42 MG/DL (ref 8–23)
BUN/CREAT BLD: ABNORMAL (ref 9–20)
CALCIUM SERPL-MCNC: 9.1 MG/DL (ref 8.6–10.4)
CHLORIDE BLD-SCNC: 107 MMOL/L (ref 98–107)
CO2: 26 MMOL/L (ref 20–31)
CREAT SERPL-MCNC: 2.1 MG/DL (ref 0.5–0.9)
GFR AFRICAN AMERICAN: 27 ML/MIN
GFR NON-AFRICAN AMERICAN: 23 ML/MIN
GFR SERPL CREATININE-BSD FRML MDRD: ABNORMAL ML/MIN/{1.73_M2}
GFR SERPL CREATININE-BSD FRML MDRD: ABNORMAL ML/MIN/{1.73_M2}
GLUCOSE BLD-MCNC: 162 MG/DL (ref 65–105)
GLUCOSE BLD-MCNC: 163 MG/DL (ref 65–105)
GLUCOSE BLD-MCNC: 177 MG/DL (ref 70–99)
GLUCOSE BLD-MCNC: 257 MG/DL (ref 65–105)
GLUCOSE BLD-MCNC: 330 MG/DL (ref 65–105)
HCT VFR BLD CALC: 36.8 % (ref 36–46)
HEMOGLOBIN: 12.3 G/DL (ref 12–16)
MCH RBC QN AUTO: 29.4 PG (ref 26–34)
MCHC RBC AUTO-ENTMCNC: 33.4 G/DL (ref 31–37)
MCV RBC AUTO: 88 FL (ref 80–100)
NRBC AUTOMATED: NORMAL PER 100 WBC
PDW BLD-RTO: 13.5 % (ref 11.5–14.9)
PLATELET # BLD: 183 K/UL (ref 150–450)
PMV BLD AUTO: 8.4 FL (ref 6–12)
POTASSIUM SERPL-SCNC: 4.6 MMOL/L (ref 3.7–5.3)
RBC # BLD: 4.18 M/UL (ref 4–5.2)
SODIUM BLD-SCNC: 144 MMOL/L (ref 135–144)
WBC # BLD: 6.6 K/UL (ref 3.5–11)

## 2021-10-23 PROCEDURE — 36415 COLL VENOUS BLD VENIPUNCTURE: CPT

## 2021-10-23 PROCEDURE — 97162 PT EVAL MOD COMPLEX 30 MIN: CPT

## 2021-10-23 PROCEDURE — 1200000000 HC SEMI PRIVATE

## 2021-10-23 PROCEDURE — 80048 BASIC METABOLIC PNL TOTAL CA: CPT

## 2021-10-23 PROCEDURE — 6370000000 HC RX 637 (ALT 250 FOR IP): Performed by: NURSE PRACTITIONER

## 2021-10-23 PROCEDURE — 97530 THERAPEUTIC ACTIVITIES: CPT

## 2021-10-23 PROCEDURE — 2580000003 HC RX 258: Performed by: NURSE PRACTITIONER

## 2021-10-23 PROCEDURE — 85027 COMPLETE CBC AUTOMATED: CPT

## 2021-10-23 PROCEDURE — 6360000002 HC RX W HCPCS: Performed by: NURSE PRACTITIONER

## 2021-10-23 PROCEDURE — 2580000003 HC RX 258: Performed by: INTERNAL MEDICINE

## 2021-10-23 PROCEDURE — 2580000003 HC RX 258: Performed by: EMERGENCY MEDICINE

## 2021-10-23 PROCEDURE — 99232 SBSQ HOSP IP/OBS MODERATE 35: CPT | Performed by: INTERNAL MEDICINE

## 2021-10-23 PROCEDURE — 82947 ASSAY GLUCOSE BLOOD QUANT: CPT

## 2021-10-23 RX ORDER — 0.9 % SODIUM CHLORIDE 0.9 %
250 INTRAVENOUS SOLUTION INTRAVENOUS ONCE
Status: COMPLETED | OUTPATIENT
Start: 2021-10-23 | End: 2021-10-23

## 2021-10-23 RX ADMIN — INSULIN LISPRO 4 UNITS: 100 INJECTION, SOLUTION INTRAVENOUS; SUBCUTANEOUS at 20:43

## 2021-10-23 RX ADMIN — TIMOLOL MALEATE 1 DROP: 5 SOLUTION OPHTHALMIC at 10:59

## 2021-10-23 RX ADMIN — LATANOPROST 1 DROP: 50 SOLUTION OPHTHALMIC at 20:45

## 2021-10-23 RX ADMIN — METOPROLOL SUCCINATE 150 MG: 100 TABLET, EXTENDED RELEASE ORAL at 10:57

## 2021-10-23 RX ADMIN — MORPHINE SULFATE 4 MG: 2 INJECTION, SOLUTION INTRAMUSCULAR; INTRAVENOUS at 20:44

## 2021-10-23 RX ADMIN — PREGABALIN 75 MG: 75 CAPSULE ORAL at 10:58

## 2021-10-23 RX ADMIN — INSULIN GLARGINE 48 UNITS: 100 INJECTION, SOLUTION SUBCUTANEOUS at 11:56

## 2021-10-23 RX ADMIN — INSULIN LISPRO 7 UNITS: 100 INJECTION, SOLUTION INTRAVENOUS; SUBCUTANEOUS at 11:59

## 2021-10-23 RX ADMIN — HYDROCHLOROTHIAZIDE 12.5 MG: 12.5 CAPSULE ORAL at 10:58

## 2021-10-23 RX ADMIN — BRIMONIDINE TARTRATE 1 DROP: 2 SOLUTION OPHTHALMIC at 20:46

## 2021-10-23 RX ADMIN — INSULIN LISPRO 2 UNITS: 100 INJECTION, SOLUTION INTRAVENOUS; SUBCUTANEOUS at 11:56

## 2021-10-23 RX ADMIN — VALSARTAN 160 MG: 320 TABLET, FILM COATED ORAL at 10:58

## 2021-10-23 RX ADMIN — TIMOLOL MALEATE 1 DROP: 5 SOLUTION OPHTHALMIC at 20:46

## 2021-10-23 RX ADMIN — SODIUM CHLORIDE, PRESERVATIVE FREE 10 ML: 5 INJECTION INTRAVENOUS at 20:52

## 2021-10-23 RX ADMIN — ACETAMINOPHEN 650 MG: 325 TABLET ORAL at 20:43

## 2021-10-23 RX ADMIN — SODIUM CHLORIDE 250 ML: 0.9 INJECTION, SOLUTION INTRAVENOUS at 14:12

## 2021-10-23 RX ADMIN — AMLODIPINE BESYLATE 5 MG: 5 TABLET ORAL at 10:58

## 2021-10-23 RX ADMIN — BRIMONIDINE TARTRATE 1 DROP: 2 SOLUTION OPHTHALMIC at 10:59

## 2021-10-23 RX ADMIN — SODIUM CHLORIDE, PRESERVATIVE FREE 10 ML: 5 INJECTION INTRAVENOUS at 11:00

## 2021-10-23 RX ADMIN — INSULIN LISPRO 7 UNITS: 100 INJECTION, SOLUTION INTRAVENOUS; SUBCUTANEOUS at 18:06

## 2021-10-23 ASSESSMENT — PAIN DESCRIPTION - ORIENTATION: ORIENTATION: LEFT

## 2021-10-23 ASSESSMENT — PAIN SCALES - GENERAL
PAINLEVEL_OUTOF10: 10
PAINLEVEL_OUTOF10: 4
PAINLEVEL_OUTOF10: 0
PAINLEVEL_OUTOF10: 0

## 2021-10-23 ASSESSMENT — PAIN DESCRIPTION - DESCRIPTORS: DESCRIPTORS: DISCOMFORT;ACHING

## 2021-10-23 ASSESSMENT — PAIN - FUNCTIONAL ASSESSMENT: PAIN_FUNCTIONAL_ASSESSMENT: PREVENTS OR INTERFERES SOME ACTIVE ACTIVITIES AND ADLS

## 2021-10-23 ASSESSMENT — PAIN DESCRIPTION - FREQUENCY: FREQUENCY: CONTINUOUS

## 2021-10-23 ASSESSMENT — PAIN DESCRIPTION - LOCATION: LOCATION: BACK;FLANK

## 2021-10-23 ASSESSMENT — PAIN DESCRIPTION - ONSET: ONSET: ON-GOING

## 2021-10-23 ASSESSMENT — PAIN DESCRIPTION - PAIN TYPE: TYPE: ACUTE PAIN

## 2021-10-23 ASSESSMENT — PAIN DESCRIPTION - PROGRESSION: CLINICAL_PROGRESSION: NOT CHANGED

## 2021-10-23 NOTE — CARE COORDINATION
ONGOING DISCHARGE PLAN:    Patient is alert and oriented x4. Spoke with patient regarding discharge plan and patient confirms that plan is still to return to home with [place]    PT recommends a hospital bed with rails and that can go up and down to make it easier for her to get into her transport chair - Will work on obtaining order for this. Cr 2.1 today    Has left renal mass - May need biopsy    New PCP appt scheduled. Will continue to follow for additional discharge needs.     Electronically signed by Arvind Ch RN on 10/23/2021 at 10:57 AM

## 2021-10-23 NOTE — PROGRESS NOTES
Physical Therapy    Facility/Department: [facility] MED SURG  Initial Assessment    NAME: Harper Knowles  : 1939  MRN: [id number]    Date of Service: 10/23/2021    Discharge Recommendations:  Patient would benefit from continued therapy after discharge   PT Equipment Recommendations  Equipment Needed: Yes  Mobility Devices: [place] Bed : Electric - Full (Head of Bed); Electric - Full  (Height of Bed); Bed Rails - Quadrant    Assessment   Body structures, Functions, Activity limitations: Decreased functional mobility ; Decreased strength;Decreased balance;Decreased endurance  Assessment: Pt with Hx of B AKA, admitted for Left Renal mass. Pt very motivated to stay independent. Pt able to transfers by scooting, at [facility]. Pt needs assist for bed mobility, will benefit form hospital bed with quad rails and adjustable height to assist with changing postions, safe transfers from variable height . Prognosis: Good  Decision Making: Medium Complexity  History: B AKA  PT Education: Plan of Care;Precautions;Pressure Relief;Transfer Training;Goals  REQUIRES PT FOLLOW UP: Yes  Activity Tolerance  Activity Tolerance: Patient Tolerated treatment well       Patient Diagnosis(es): The encounter diagnosis was Left renal mass. has a past medical history of Amputation of leg ([address] CAD (coronary artery disease), CAD (coronary artery disease), Chronic back pain, Coughing, Depression, Diabetes, Diphtheria, Full dentures, GERD (gastroesophageal reflux disease), Glaucoma, Headache(784.0), History of blood transfusion, Hx of blood clots, Hyperlipidemia, Hypertension, IDDM (insulin dependent diabetes mellitus), Leg pain, bilateral, LONG TERM ANTICOAGULENT USE, Neuropathy, Numbness of toes, Obesity, Osteoarthritis, Peripheral vascular disease ([address] Rash, skin, Type II or unspecified type diabetes mellitus without mention of complication, not stated as uncontrolled, Wears dentures, Wears glasses, and Wears glasses.    has a past surgical history that includes Bypass Graft (2008); Hysterectomy, total abdominal; Cataract removal (1980); Coronary artery bypass graft (2001); Coronary angioplasty; and Leg amputation through knee (Left). Restrictions  Restrictions/Precautions  Restrictions/Precautions: General Precautions, Fall Risk  Required Braces or Orthoses?: No  Implants present? :  (Cardiac stents)  Position Activity Restriction  Other position/activity restrictions: Up as tolerated, B BKA  Vision/Hearing  Vision: Impaired  Vision Exceptions: Wears glasses at all times (Glasses not present)  Hearing: Within functional limits     Subjective  General  Patient assessed for rehabilitation services?: Yes  Additional Pertinent Hx: The patient is a [de-identified] y.o. female who presents with left sided flank pain and dysuria. She has been having pain for about 1 month. She initially had right sided pain, but then the pain moved to the left. She has not had any fevers. She had hematuria about 5 years ago. She had a CT, which showed a possible left renal mass. Urology following-might need Biopsy/cystoscopy.   Referral Date : 10/22/21  Diagnosis: Left renal mass  Follows Commands: Within Functional Limits  Pain Screening  Patient Currently in Pain: Yes  Pain Assessment  Pain Assessment: 0-10  Pain Level: 4  Pain Type: Acute pain  Pain Location: Back;Flank  Pain Orientation: Left  Pain Descriptors: Discomfort;Aching  Pain Frequency: Continuous  Pain Onset: On-going  Clinical Progression: Not changed  Functional Pain Assessment: Prevents or interferes some active activities and ADLs  Vital Signs  BP Location: Left upper arm  Level of Consciousness: Alert (0)  Patient Currently in Pain: Yes  Oxygen Therapy  O2 Device: None (Room air)  Patient Observation  Observations: B BKA       Orientation  Orientation  Overall Orientation Status: Within Functional Limits  Social/Functional History  Social/Functional History  Lives With: Alone  Type of Home: Apartment therapist holding onto [facility] to make sure it does not slide away. Balance  Posture: Good  Sitting - Static: Good  Sitting - Dynamic: Fair;+  Standing - Static:  (NA)  Standing - Dynamic:  (NA)        Plan   Plan  Times per week: 5 x/week  Current Treatment Recommendations: Transfer Training, Functional Mobility Training, Balance Training, Strengthening, Endurance Training, Safety Education & Training, Patient/Caregiver Education & Training, Equipment Evaluation, Education, & procurement, Home Exercise Program  Safety Devices  Type of devices: Left in bed, Gait belt, Call light within reach, Bed alarm in place    G-Code       OutComes Score                                                  AM-PAC Score     AM-PAC Inpatient Mobility without Stair Climbing Raw Score : 13 (10/23/21 1210)  AM-PAC Inpatient without Stair Climbing T-Scale Score : 38.96 (10/23/21 1210)  Mobility Inpatient CMS 0-100% Score: 58.44 (10/23/21 1210)  Mobility Inpatient without Stair CMS G-Code Modifier : CK (10/23/21 1210)       Goals  Short term goals  Time Frame for Short term goals: 5 to 6 vsits  Short term goal 1: Pt to demonstrate rolling independently in bed to chage psotion  Short term goal 2: Pt able to perform long sitting/supine<>sit independently  Short term goal 3:  Pt able to demonstrate transfers at [facility] from level surfaces  Short term goal 4: Pt able to perfrom transfers at [address] on sight incline surfaces  Short term goal 5: Pt to tolerate activity at EOB to imprve core strengthening  to improve transfers skills at home. Patient Goals   Patient goals : Be able to be independent.         Therapy Time   Individual Concurrent Group Co-treatment   Time In 72         Time Out 1015         Minutes 33         Timed Code Treatment Minutes: 51 [facility]       Bekah Azevedo, PT

## 2021-10-23 NOTE — FLOWSHEET NOTE
10/23/21 1343   Encounter Summary   Services provided to: Patient   Referral/Consult From: Rounding   Complexity of Encounter Low   Length of Encounter 15 minutes   Spiritual/Voodoo   Type Spiritual support   Assessment Sleeping   Intervention Prayer

## 2021-10-23 NOTE — PROGRESS NOTES
Writer reached out to urology in regards to potential cysto today and need for NPO status. Ok to resume diet, no plans for cysto at this time.

## 2021-10-23 NOTE — DISCHARGE INSTR - COC
Continuity of Care Form    Patient Name: Brittany Cha   :  1939  MRN:  [id number]    Admit date:  10/21/2021  Discharge date:  10/25/21    Code Status Order: Full Code   Advance Directives:      Admitting Physician:  Andrez Claudio MD  PCP: No primary care provider on file. Discharging Nurse: [facility] Unit/Room#: 2065/2065-01  Discharging Unit Phone Number: [phone]    Emergency Contact:   Extended Emergency Contact Information  Primary Emergency Contact: Aubrey Chinchilla [address] Phone: [phone]  Mobile Phone: [phone]  Relation: Child  Secondary Emergency Contact: Nirav Naylor  [place] Phone: [phone]  Relation: Child    Past Surgical History:  Past Surgical History:   Procedure Laterality Date    BYPASS GRAFT  2008    CATARACT REMOVAL  1980    bilateral    CORONARY ANGIOPLASTY      STENT X2    CORONARY ARTERY BYPASS GRAFT      HYSTERECTOMY, TOTAL ABDOMINAL      LEG AMPUTATION THROUGH KNEE Left        Immunization History:   Immunization History   Administered Date(s) Administered    Influenza 10/10/2013    Influenza Virus Vaccine 2014, 10/31/2014    Pneumococcal Conjugate 7-valent (Jaylin Brink) 10/26/2007       Active Problems:  Patient Active Problem List   Diagnosis Code    Depression F32. 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    S/P AKA (above knee amputation) bilateral ([address] Z89.611, T89.932    CKD (chronic kidney disease), stage III (HCC) N18.30    Left renal mass N28.89       Isolation/Infection:   Isolation            No Isolation          Patient Infection Status None to display            Nurse Assessment:  Last Vital Signs: BP (!) 144/58   Pulse 53   Temp 97.6 °F (36.4 °C) (Oral)   Resp 14   Ht 5' 5\" (1.651 m) Comment: Patient reported height before bilateral amputations  Wt 164 lb 0.4 oz (74.4 kg)   LMP  (LMP Unknown)   SpO2 93%   BMI 27.29 kg/m²     Last documented pain score (0-10 scale): Pain Level: 4  Last Weight:   Wt Readings from Last 1 Encounters:   10/21/21 164 lb 0.4 oz (74.4 kg)     Mental Status:  oriented and alert    IV Access:  - None    Nursing Mobility/ADLs:  Walking   Assisted  Transfer  Assisted  Bathing  Assisted  Dressing  Independent  Toileting  Assisted  Feeding  Independent  Med Admin  Independent  Med Delivery   whole             Safety Concerns: At Risk for Falls    Impairments/Disabilities:      Amputation - bilat BKA    Nutrition Therapy:  Current Nutrition Therapy:   - Oral Diet:  General    Routes of Feeding: Oral  Liquids: No Restrictions  Daily Fluid Restriction: no  Last Modified Barium Swallow with Video (Video Swallowing Test): not done    Treatments at the Time of Hospital Discharge:   Respiratory Treatments: see mar  Oxygen Therapy:  is not on home oxygen therapy.   Ventilator:    - No ventilator support    Rehab Therapies: Physical Therapy and Occupational Therapy  Weight Bearing Status/Restrictions: No weight bearing restirctions  Other Medical Equipment (for information only, NOT a DME order):  wheelchair  Other Treatments: skilled nursing assessment, medication education and monitoring    Patient's personal belongings (please select all that are sent with patient):  None    RN SIGNATURE:  Electronically signed by Brendan Zacarias RN on 10/25/21 at 1:41 PM EDT    CASE MANAGEMENT/SOCIAL WORK SECTION    Inpatient Status Date: 10/21/21    Readmission Risk Assessment Score:  Readmission Risk              Risk of Unplanned Readmission:  13           Discharging to Facility/ Agency   · Name: · Address:  · Phone:  · Fax:    Dialysis Facility (if applicable)   · Name:  · Address:  · Dialysis Schedule:  · Phone:  · Fax:    / signature: Electronically signed by Khris Sterling RN on 10/25/21 at 1:41 PM EDT    PHYSICIAN SECTION    Prognosis: Fair    Condition at Discharge: Stable    Rehab Potential (if transferring to Rehab): Fair    Recommended Labs or Other Treatments After Discharge: see above    Physician Certification: I certify the above information and transfer of Jimmy Browne  is necessary for the continuing treatment of the diagnosis listed and that she requires [address] for less 30 days.      Update Admission H&P: No change in H&P    PHYSICIAN SIGNATURE:  Electronically signed by César Mcneill MD on 10/25/21 at 12:33 PM EDT

## 2021-10-23 NOTE — PROGRESS NOTES
[facility] 52 Internal Medicine    Progress Note  Patient assessed for rehabilitation services?: Yes  Additional Pertinent Hx: The patient is a [de-identified] y.o. female who presents with left sided flank pain and dysuria. She has been having pain for about 1 month. She initially had right sided pain, but then the pain moved to the left. She has not had any fevers. She had hematuria about 5 years ago. She had a CT, which showed a possible left renal mass. Urology following-might need Biopsy/cystoscopy. Referral Date : 10/22/21  Diagnosis: Left renal mass  10/23/2021    1:30 PM    Name:   Rose Martinez  MRN:     [id number]     Acct:      [de-identified]   Room:   2065/2065-01  IP Day:  2  Admit Date:  10/21/2021  2:36 PM    PCP:   No primary care provider on file. Code Status:  Full Code    Subjective:     C/C:   Chief Complaint   Patient presents with    Flank Pain     left side x1 month    Dysuria     Principal Problem:    Left renal mass  Active Problems:    Diabetes mellitus, type 2    CAD s/p CABG 2001, stent June 2013    Hypertension    Diabetic nephropathy ([address]    PVD s/p Left AKA    S/P AKA (above knee amputation) bilateral (HCC)    CKD (chronic kidney disease), stage III ([address]  Resolved Problems:    * No resolved hospital problems.  *      carson   Creat gone upto 2.1 from 1.2            Significant last 24 hr data reviewed ;   Vitals:    10/22/21 1835 10/23/21 0640 10/23/21 0649 10/23/21 1057   BP: (!) 128/52 (!) 144/58     Pulse: 56 53  68   Resp: 16 14     Temp: 98.4 °F (36.9 °C) 97.6 °F (36.4 °C)     TempSrc:  Oral Oral    SpO2: 96% 93%     Weight:       Height:          Recent Results (from the past 24 hour(s))   POC Glucose Fingerstick    Collection Time: 10/22/21  3:57 PM   Result Value Ref Range    POC Glucose 270 (H) 65 - 105 mg/dL   POC Glucose Fingerstick    Collection Time: 10/22/21  9:32 PM   Result Value Ref Range    POC Glucose 99 65 - 105 mg/dL   Basic Metabolic Panel w/ Reflex to MG    Collection Time: 10/23/21  6:24 AM   Result Value Ref Range    Glucose 177 (H) 70 - 99 mg/dL    BUN 42 (H) 8 - 23 mg/dL    CREATININE 2.10 (H) 0.50 - 0.90 mg/dL    Bun/Cre Ratio NOT REPORTED 9 - 20    Calcium 9.1 8.6 - 10.4 mg/dL    Sodium 144 135 - 144 mmol/L    Potassium 4.6 3.7 - 5.3 mmol/L    Chloride 107 98 - 107 mmol/L    CO2 26 20 - 31 mmol/L    Anion Gap 11 9 - 17 mmol/L    GFR Non-African American 23 (L) >60 mL/min    GFR  27 (L) >60 mL/min    GFR Comment          GFR Staging NOT REPORTED    CBC    Collection Time: 10/23/21  6:24 AM   Result Value Ref Range    WBC 6.6 3.5 - 11.0 k/uL    RBC 4.18 4.0 - 5.2 m/uL    Hemoglobin 12.3 12.0 - 16.0 g/dL    Hematocrit 36.8 36 - 46 %    MCV 88.0 80 - 100 fL    MCH 29.4 26 - 34 pg    MCHC 33.4 31 - 37 g/dL    RDW 13.5 11.5 - 14.9 %    Platelets 021 525 - 116 k/uL    MPV 8.4 6.0 - 12.0 fL    NRBC Automated NOT REPORTED per 100 WBC   POC Glucose Fingerstick    Collection Time: 10/23/21  6:42 AM   Result Value Ref Range    POC Glucose 162 (H) 65 - 105 mg/dL   POC Glucose Fingerstick    Collection Time: 10/23/21 11:27 AM   Result Value Ref Range    POC Glucose 163 (H) 65 - 105 mg/dL     Recent Labs     10/22/21  1107 10/22/21  1557 10/22/21  2132 10/23/21  0642 10/23/21  1127   POCGLU 277* 270* 99 162* 163*        CT ABDOMEN PELVIS WO CONTRAST Additional Contrast? None    Result Date: 10/21/2021  EXAMINATION: CT OF THE ABDOMEN AND PELVIS WITHOUT CONTRAST 10/21/2021 3:42 pm TECHNIQUE: CT of the abdomen and pelvis was performed without the administration of intravenous contrast. Multiplanar reformatted images are provided for review. Dose modulation, iterative reconstruction, and/or weight based adjustment of the mA/kV was utilized to reduce the radiation dose to as low as reasonably achievable. COMPARISON: None.  HISTORY: ORDERING SYSTEM PROVIDED HISTORY: flank pain TECHNOLOGIST PROVIDED HISTORY: flank pain Decision Support Exception - unselect if not a suspected or confirmed emergency medical condition->Emergency Medical Condition (MA) Reason for Exam: left flank pain Acuity: Acute Type of Exam: Initial FINDINGS: Lower Chest: The visualized heart and lungs show no acute abnormalities. Organs: Limited evaluation the absence of IV contrast.  Liver, spleen, pancreas, adrenal glands show no significant abnormalities. There is other gallbladder wall calcification or a few tiny gallstones. No nephrolithiasis. Renal vascular calcifications. The left kidney appears somewhat amorphous with loss of the hilar fat. Absence of IV contrast limits evaluation. This may be further assessed with MRI. GI/Bowel: There is limited evaluation due to absence of oral contrast.  No bowel obstruction. Normal appendix. Pelvis: Urinary bladder shows no calculi. Hysterectomy noted. Peritoneum/Retroperitoneum: No ureteric calculus. Amorphous left para-aortic soft tissue density with some stranding not optimally assessed in the absence of IV contrast.  Possibilities include scarring, conglomerate of adenopathy and or periaortic hematoma. IVC filter in place. Bones/Soft Tissues: No acute abnormality of the bones. The superficial soft tissues show no significant abnormalities. 1. Somewhat amorphous appearance of the left kidney with loss of hilar fat representing a change from prior. Lack of IV contrast limits evaluation. Contrast enhanced CT or MRI may be helpful for further evaluation. 2. Left periaortic amorphous soft tissue extending along the length of the infrarenal aorta about 2.0 x 2.2 cm in maximal AP by transverse dimension. This has developed since 2012. Not well assessed in the absence of IV contrast.Possibilities include scarring, conglomerate of adenopathy and or periaortic hematoma. Further evaluation contrast-enhanced CT may be considered.      CT ABDOMEN PELVIS W IV CONTRAST Additional Contrast? None    Addendum Date: 10/21/2021    ADDENDUM: Differential considerations would include primary urothelial carcinoma, involving the left renal pelvis and ureter, with metastatic adenopathy to the retroperitoneum. Result Date: 10/21/2021  EXAMINATION: CT OF THE ABDOMEN AND PELVIS WITH CONTRAST 10/21/2021 10:55 am TECHNIQUE: CT of the abdomen and pelvis was performed with the administration of intravenous contrast. Multiplanar reformatted images are provided for review. Dose modulation, iterative reconstruction, and/or weight based adjustment of the mA/kV was utilized to reduce the radiation dose to as low as reasonably achievable. COMPARISON: CT scan dated October 21, 2021 and 25 October 2012 HISTORY: ORDERING SYSTEM PROVIDED HISTORY: pain TECHNOLOGIST PROVIDED HISTORY: pain Decision Support Exception - unselect if not a suspected or confirmed emergency medical condition->Emergency Medical Condition (MA) Reason for Exam: pain Acuity: Unknown Type of Exam: Unknown Additional signs and symptoms: PT STATES LEFT SIDED PAIN FINDINGS: Lower Chest: Dependent changes are present within the lung bases. Extensive coronary arterial calcifications are present. Organs: The liver, spleen, pancreas, and gallbladder demonstrate no acute abnormality. Cholelithiasis is present without evidence of cholecystitis. Cortical cyst formation is present on the left kidney. Abnormal soft tissue attenuation is present within the left renal pelvis, surrounding the pelvocaliceal system, extending along the course of the left ureter. .  No hydronephrosis is present. GI/Bowel: Small hiatal hernia is present. Small bowel appears normal without evidence of obstruction. The appendix is normal.  Scattered colonic diverticula are present without evidence of diverticulitis. Pelvis: Urinary bladder is incompletely distended. The uterus is surgically absent. Peritoneum/Retroperitoneum: Inferior vena cava filter is in place.   Many of the legs of the filter project beyond the lumen of the inferior vena cava. The inferior vena cava caudal to the filter is small in caliber, with heterogeneous attenuation, suggesting chronic thrombosis. Atherosclerotic changes are present throughout the course of the abdominal aorta. Abnormal soft tissue attenuation is present within the left para-aortic region with loss of the normal fat plane adjacent to the abdominal aorta. Soft tissue measures approximately 2.3 x 2 cm in greatest AP and transverse dimension. The soft tissue attenuation extends caudally within the retroperitoneum, along the course of the left ureter, into the pelvis. Bones/Soft Tissues: No acute osseous abnormality is present. 1. Abnormal soft tissue attenuation encompassing the left pelvocaliceal system, within the left renal pelvis, extending caudally along the course of the ureter, and most prominent within the left para-aortic region. Differential considerations would include lymphoma, retroperitoneal fibrosis, or potentially metastatic adenopathy within the periaortic region. Correlation with urine cytology recommended. If this is negative, CT-guided percutaneous biopsy of the percutaneous soft tissue mass should be considered. On admission         Review of Systems:     Constitutional:  negative for chills, fevers, sweats  Respiratory:  negative for cough, dyspnea on exertion, hemoptysis, shortness of breath, wheezing  Cardiovascular:  negative for chest pain, chest pressure/discomfort, lower extremity edema, palpitations  Gastrointestinal:  negative for abdominal pain, constipation, diarrhea, nausea, vomiting  Neurological:  negative for dizziness, headache  Data:     Past Medical History:  no change     Social History:  no change    Family History: @no change    Vitals:      I/O (24Hr):     Intake/Output Summary (Last 24 hours) at 10/23/2021 1330  Last data filed at 10/23/2021 0647  Gross per 24 hour   Intake 1897 ml   Output 225 ml   Net 1672 ml Labs:    Radiology:    Medications: Allergies:      Current Meds:   Scheduled Meds:    sodium chloride  250 mL IntraVENous Once    amLODIPine  5 mg Oral Daily    [Held by provider] aspirin  81 mg Oral Daily    atorvastatin  40 mg Oral Daily    latanoprost  1 drop Both Eyes Nightly    metoprolol succinate  150 mg Oral Daily    pregabalin  75 mg Oral Daily    valsartan  160 mg Oral Daily    hydroCHLOROthiazide  12.5 mg Oral Daily    insulin lispro  0-12 Units SubCUTAneous TID WC    insulin lispro  0-6 Units SubCUTAneous Nightly    brimonidine  1 drop Both Eyes BID    And    timolol  1 drop Both Eyes BID    insulin glargine  48 Units SubCUTAneous Daily    insulin lispro  7 Units SubCUTAneous TID WC    sodium chloride flush  5-40 mL IntraVENous 2 times per day    enoxaparin  40 mg SubCUTAneous Daily     Continuous Infusions:    dextrose      sodium chloride       PRN Meds: morphine **OR** morphine, nitroGLYCERIN, glucose, dextrose, glucagon (rDNA), dextrose, sodium chloride flush, sodium chloride, ondansetron **OR** ondansetron, magnesium hydroxide, acetaminophen **OR** acetaminophen      Physical Examination:        BP (!) 144/58   Pulse 68   Temp 97.6 °F (36.4 °C) (Oral)   Resp 14   Ht 5' 5\" (1.651 m) Comment: Patient reported height before bilateral amputations  Wt 164 lb 0.4 oz (74.4 kg)   LMP  (LMP Unknown)   SpO2 93%   BMI 27.29 kg/m²   Temp (24hrs), Av.1 °F (36.7 °C), Min:97.6 °F (36.4 °C), Max:98.4 °F (36.9 °C)    Recent Labs     10/22/21  1557 10/22/21  2132 10/23/21  0642 10/23/21  1127   POCGLU 270* 99 162* 163*       Intake/Output Summary (Last 24 hours) at 10/23/2021 1330  Last data filed at 10/23/2021 6225  Gross per 24 hour   Intake 1897 ml   Output 225 ml   Net 1672 ml       General Appearance:  alert, well appearing, and in no acute distress  Mental status:   Head:  normocephalic, atraumatic.   Eye: no icterus, redness, pupils equal and reactive, extraocular eye

## 2021-10-24 LAB
ANION GAP SERPL CALCULATED.3IONS-SCNC: 10 MMOL/L (ref 9–17)
BUN BLDV-MCNC: 53 MG/DL (ref 8–23)
BUN/CREAT BLD: ABNORMAL (ref 9–20)
CALCIUM SERPL-MCNC: 8.8 MG/DL (ref 8.6–10.4)
CHLORIDE BLD-SCNC: 107 MMOL/L (ref 98–107)
CO2: 26 MMOL/L (ref 20–31)
CREAT SERPL-MCNC: 1.76 MG/DL (ref 0.5–0.9)
GFR AFRICAN AMERICAN: 34 ML/MIN
GFR NON-AFRICAN AMERICAN: 28 ML/MIN
GFR SERPL CREATININE-BSD FRML MDRD: ABNORMAL ML/MIN/{1.73_M2}
GFR SERPL CREATININE-BSD FRML MDRD: ABNORMAL ML/MIN/{1.73_M2}
GLUCOSE BLD-MCNC: 141 MG/DL (ref 70–99)
GLUCOSE BLD-MCNC: 145 MG/DL (ref 65–105)
GLUCOSE BLD-MCNC: 243 MG/DL (ref 65–105)
GLUCOSE BLD-MCNC: 249 MG/DL (ref 65–105)
GLUCOSE BLD-MCNC: 299 MG/DL (ref 65–105)
HCT VFR BLD CALC: 38.2 % (ref 36–46)
HEMOGLOBIN: 12.7 G/DL (ref 12–16)
MCH RBC QN AUTO: 29.1 PG (ref 26–34)
MCHC RBC AUTO-ENTMCNC: 33.2 G/DL (ref 31–37)
MCV RBC AUTO: 87.8 FL (ref 80–100)
NRBC AUTOMATED: NORMAL PER 100 WBC
PDW BLD-RTO: 13.6 % (ref 11.5–14.9)
PLATELET # BLD: 211 K/UL (ref 150–450)
PMV BLD AUTO: 8.3 FL (ref 6–12)
POTASSIUM SERPL-SCNC: 3.8 MMOL/L (ref 3.7–5.3)
RBC # BLD: 4.35 M/UL (ref 4–5.2)
SODIUM BLD-SCNC: 143 MMOL/L (ref 135–144)
WBC # BLD: 5.5 K/UL (ref 3.5–11)

## 2021-10-24 PROCEDURE — 1200000000 HC SEMI PRIVATE

## 2021-10-24 PROCEDURE — 2580000003 HC RX 258: Performed by: INTERNAL MEDICINE

## 2021-10-24 PROCEDURE — 82947 ASSAY GLUCOSE BLOOD QUANT: CPT

## 2021-10-24 PROCEDURE — 99232 SBSQ HOSP IP/OBS MODERATE 35: CPT | Performed by: INTERNAL MEDICINE

## 2021-10-24 PROCEDURE — 80048 BASIC METABOLIC PNL TOTAL CA: CPT

## 2021-10-24 PROCEDURE — 85027 COMPLETE CBC AUTOMATED: CPT

## 2021-10-24 PROCEDURE — 36415 COLL VENOUS BLD VENIPUNCTURE: CPT

## 2021-10-24 PROCEDURE — 6370000000 HC RX 637 (ALT 250 FOR IP): Performed by: INTERNAL MEDICINE

## 2021-10-24 PROCEDURE — 6360000002 HC RX W HCPCS: Performed by: NURSE PRACTITIONER

## 2021-10-24 PROCEDURE — 2580000003 HC RX 258: Performed by: NURSE PRACTITIONER

## 2021-10-24 PROCEDURE — 6370000000 HC RX 637 (ALT 250 FOR IP): Performed by: NURSE PRACTITIONER

## 2021-10-24 RX ORDER — OXYCODONE HYDROCHLORIDE AND ACETAMINOPHEN 5; 325 MG/1; MG/1
1 TABLET ORAL EVERY 6 HOURS PRN
Status: DISCONTINUED | OUTPATIENT
Start: 2021-10-24 | End: 2021-10-25 | Stop reason: HOSPADM

## 2021-10-24 RX ORDER — METOPROLOL SUCCINATE 100 MG/1
100 TABLET, EXTENDED RELEASE ORAL DAILY
Status: DISCONTINUED | OUTPATIENT
Start: 2021-10-25 | End: 2021-10-25 | Stop reason: HOSPADM

## 2021-10-24 RX ORDER — 0.9 % SODIUM CHLORIDE 0.9 %
1000 INTRAVENOUS SOLUTION INTRAVENOUS ONCE
Status: COMPLETED | OUTPATIENT
Start: 2021-10-24 | End: 2021-10-24

## 2021-10-24 RX ADMIN — SODIUM CHLORIDE, PRESERVATIVE FREE 10 ML: 5 INJECTION INTRAVENOUS at 07:55

## 2021-10-24 RX ADMIN — INSULIN GLARGINE 48 UNITS: 100 INJECTION, SOLUTION SUBCUTANEOUS at 07:44

## 2021-10-24 RX ADMIN — TIMOLOL MALEATE 1 DROP: 5 SOLUTION OPHTHALMIC at 21:39

## 2021-10-24 RX ADMIN — ATORVASTATIN CALCIUM 40 MG: 40 TABLET, FILM COATED ORAL at 07:43

## 2021-10-24 RX ADMIN — BRIMONIDINE TARTRATE 1 DROP: 2 SOLUTION OPHTHALMIC at 07:53

## 2021-10-24 RX ADMIN — OXYCODONE HYDROCHLORIDE AND ACETAMINOPHEN 1 TABLET: 5; 325 TABLET ORAL at 21:41

## 2021-10-24 RX ADMIN — SODIUM CHLORIDE, PRESERVATIVE FREE 10 ML: 5 INJECTION INTRAVENOUS at 21:41

## 2021-10-24 RX ADMIN — INSULIN LISPRO 6 UNITS: 100 INJECTION, SOLUTION INTRAVENOUS; SUBCUTANEOUS at 21:40

## 2021-10-24 RX ADMIN — AMLODIPINE BESYLATE 5 MG: 5 TABLET ORAL at 07:42

## 2021-10-24 RX ADMIN — INSULIN LISPRO 7 UNITS: 100 INJECTION, SOLUTION INTRAVENOUS; SUBCUTANEOUS at 12:43

## 2021-10-24 RX ADMIN — METOPROLOL SUCCINATE 150 MG: 100 TABLET, EXTENDED RELEASE ORAL at 07:42

## 2021-10-24 RX ADMIN — TIMOLOL MALEATE 1 DROP: 5 SOLUTION OPHTHALMIC at 07:53

## 2021-10-24 RX ADMIN — PREGABALIN 75 MG: 75 CAPSULE ORAL at 07:42

## 2021-10-24 RX ADMIN — SODIUM CHLORIDE 1000 ML: 9 INJECTION, SOLUTION INTRAVENOUS at 09:50

## 2021-10-24 RX ADMIN — VALSARTAN 160 MG: 320 TABLET, FILM COATED ORAL at 07:42

## 2021-10-24 RX ADMIN — INSULIN LISPRO 4 UNITS: 100 INJECTION, SOLUTION INTRAVENOUS; SUBCUTANEOUS at 12:46

## 2021-10-24 RX ADMIN — BRIMONIDINE TARTRATE 1 DROP: 2 SOLUTION OPHTHALMIC at 21:39

## 2021-10-24 RX ADMIN — HYDROCHLOROTHIAZIDE 12.5 MG: 12.5 CAPSULE ORAL at 07:53

## 2021-10-24 RX ADMIN — LATANOPROST 1 DROP: 50 SOLUTION OPHTHALMIC at 21:39

## 2021-10-24 RX ADMIN — ENOXAPARIN SODIUM 40 MG: 100 INJECTION SUBCUTANEOUS at 21:40

## 2021-10-24 ASSESSMENT — PAIN SCALES - GENERAL
PAINLEVEL_OUTOF10: 0
PAINLEVEL_OUTOF10: 0
PAINLEVEL_OUTOF10: 7

## 2021-10-24 NOTE — CARE COORDINATION
[address] Encounter Date/Time: 10/21/2021 534 Keesha Will Account: [de-identified]    MRN: [id number]    Patient: Harper Patient    Contact Serial #: [id number]      ENCOUNTER          Patient Class: I Private Enc? No Unit RM BD: Missy 15    Hospital Service: Med/Surg   Encounter DX: Left renal mass [N28.89]   ADM Provider: Erinn Paz MD   Procedure:     ATT Provider: Erinn Paz MD   REF Provider:        Admission DX: Left renal mass and DX codes: N28.89      PATIENT                 Name: Harper Patient : 1939 (82 yrs)   Address: [facility] [address]  Sex: Female   City: [address] [place], [place] [id number]         Marital Status: Single   Employer: N/A         Sikh: Bahai   Primary Care Provider:           Primary Phone: [phone]   EMERGENCY CONTACT   Contact Name Legal Guardian? Relationship to Patient Home Phone Work Phone   1. Yuni Naylor  2. CyndyNirav      Child  Child [phone]              GUARANTOR            Guarantor: Harper Patient     : 1939   Address: [address] [place] [address] [address] Sex: Female     [place] [address]     Relation to Patient: Self       Home Phone: [phone]   Guarantor ID: [id number]       Work Phone:     Guarantor Employer: N/A         Status: NOT EMPLO*      COVERAGE        PRIMARY INSURANCE   Payor: [facility]ICARE [facility]: [facility]   Payor Address: [facility] [id number] [place] [address]       Group Number: [id number] Type: INDEMNITY   Subscriber Name: Jose Ryan : 1939   Subscriber ID: [id number] Phu Mendoza. Rel. to Sub: Self   SECONDARY INSURANCE   Payor:   Plan:     Payor Address:  ,           Group Number:   Insurance Type:     Subscriber Name:   Subscriber :     Subscriber ID:   Pat. Rel. to Sub:             CSN: [id number]     Patient Information      Name:  Harper Patient  :  1939  Age:  [de-identified] y.o.    Address:  [address], [place] [address] [place] [address]   Zip: [address]  PCP: No primary care provider on file. Sex:  F  SSN: [id number]  Home Phone: [phone]  Work Phone:    Patient MRN:  [id number]    Alt Patient ID:  [id number]  PCP Phone: None       Authorizing Provider Information       AUTHORIZING PROVIDER: Jorge Mcnally MD  Physician ID: [id number]  NPI:  [id number]  Site:   Address: [address] [place] [address]  Phone: [phone]  Fax: [phone]              EQUIPMENT ORDERED  DME Order for [address] as OP [DME03] (ORD   #:   [id number]) Priority  Routine Class  Hospital Performed        Associated Diagnosis:          Comments:    You must complete the order parameters below and add the medical necessity documentation for this DME in a separate note.     Variable height non-electric hospital bed with mattress and any type side rails     Current patient weight: Weight: 164 lb 0.4 oz (74.4 kg)  Current patient height: Height: 5' 5\" (165.1 cm) (Patient reported height before bilateral amputations)  Diagnosis: bilateral akmputee  Length of need: Lifetime            Scheduling Instructions:                                 Specimen Source             Collection Date    Collection Time    Order Status  Standing Expected Date                Electronically Signed By  Jorge Mcnally MD Date  Oct 24, 2021  10:29 AM              Responsible Party Yaritza Rivera-ActID   Relationship Account Type Home Phone   CAMILA MORENO - [id number]* [address], [address] Self P/F [phone]   Employer   Work Phone   N/A [address] [address]     Primary Insurance  Insurance/Subscriber ID:  [id number] Name:  [address] [place] [address]              Relationship to Patient: SelfSigned ABN: N    Payor Name:  [address] [place] [address] [place]am[place]:  LANCE MEDIBLUE ESSENTIAL/PLUS   Group: [employer]  Worker's Comp Date of Injury:                    Electronically signed by Merced Cooper RN on

## 2021-10-24 NOTE — PROGRESS NOTES
few tiny gallstones. No nephrolithiasis. Renal vascular calcifications. The left kidney appears somewhat amorphous with loss of the hilar fat. Absence of IV contrast limits evaluation. This may be further assessed with MRI. GI/Bowel: There is limited evaluation due to absence of oral contrast.  No bowel obstruction. Normal appendix. Pelvis: Urinary bladder shows no calculi. Hysterectomy noted. Peritoneum/Retroperitoneum: No ureteric calculus. Amorphous left para-aortic soft tissue density with some stranding not optimally assessed in the absence of IV contrast.  Possibilities include scarring, conglomerate of adenopathy and or periaortic hematoma. IVC filter in place. Bones/Soft Tissues: No acute abnormality of the bones. The superficial soft tissues show no significant abnormalities. 1. Somewhat amorphous appearance of the left kidney with loss of hilar fat representing a change from prior. Lack of IV contrast limits evaluation. Contrast enhanced CT or MRI may be helpful for further evaluation. 2. Left periaortic amorphous soft tissue extending along the length of the infrarenal aorta about 2.0 x 2.2 cm in maximal AP by transverse dimension. This has developed since 2012. Not well assessed in the absence of IV contrast.Possibilities include scarring, conglomerate of adenopathy and or periaortic hematoma. Further evaluation contrast-enhanced CT may be considered. CT ABDOMEN PELVIS W IV CONTRAST Additional Contrast? None    Addendum Date: 10/21/2021    ADDENDUM: Differential considerations would include primary urothelial carcinoma, involving the left renal pelvis and ureter, with metastatic adenopathy to the retroperitoneum. Result Date: 10/21/2021  EXAMINATION: CT OF THE ABDOMEN AND PELVIS WITH CONTRAST 10/21/2021 10:55 am TECHNIQUE: CT of the abdomen and pelvis was performed with the administration of intravenous contrast. Multiplanar reformatted images are provided for review.  Dose modulation, iterative reconstruction, and/or weight based adjustment of the mA/kV was utilized to reduce the radiation dose to as low as reasonably achievable. COMPARISON: CT scan dated October 21, 2021 and 25 October 2012 HISTORY: ORDERING SYSTEM PROVIDED HISTORY: pain TECHNOLOGIST PROVIDED HISTORY: pain Decision Support Exception - unselect if not a suspected or confirmed emergency medical condition->Emergency Medical Condition (MA) Reason for Exam: pain Acuity: Unknown Type of Exam: Unknown Additional signs and symptoms: PT STATES LEFT SIDED PAIN FINDINGS: Lower Chest: Dependent changes are present within the lung bases. Extensive coronary arterial calcifications are present. Organs: The liver, spleen, pancreas, and gallbladder demonstrate no acute abnormality. Cholelithiasis is present without evidence of cholecystitis. Cortical cyst formation is present on the left kidney. Abnormal soft tissue attenuation is present within the left renal pelvis, surrounding the pelvocaliceal system, extending along the course of the left ureter. .  No hydronephrosis is present. GI/Bowel: Small hiatal hernia is present. Small bowel appears normal without evidence of obstruction. The appendix is normal.  Scattered colonic diverticula are present without evidence of diverticulitis. Pelvis: Urinary bladder is incompletely distended. The uterus is surgically absent. Peritoneum/Retroperitoneum: Inferior vena cava filter is in place. Many of the legs of the filter project beyond the lumen of the inferior vena cava. The inferior vena cava caudal to the filter is small in caliber, with heterogeneous attenuation, suggesting chronic thrombosis. Atherosclerotic changes are present throughout the course of the abdominal aorta. Abnormal soft tissue attenuation is present within the left para-aortic region with loss of the normal fat plane adjacent to the abdominal aorta.   Soft tissue measures approximately 2.3 x 2 cm in greatest AP and transverse dimension. The soft tissue attenuation extends caudally within the retroperitoneum, along the course of the left ureter, into the pelvis. Bones/Soft Tissues: No acute osseous abnormality is present. 1. Abnormal soft tissue attenuation encompassing the left pelvocaliceal system, within the left renal pelvis, extending caudally along the course of the ureter, and most prominent within the left para-aortic region. Differential considerations would include lymphoma, retroperitoneal fibrosis, or potentially metastatic adenopathy within the periaortic region. Correlation with urine cytology recommended. If this is negative, CT-guided percutaneous biopsy of the percutaneous soft tissue mass should be considered. On admission         Review of Systems:     Constitutional:  negative for chills, fevers, sweats  Respiratory:  negative for cough, dyspnea on exertion, hemoptysis, shortness of breath, wheezing  Cardiovascular:  negative for chest pain, chest pressure/discomfort, lower extremity edema, palpitations  Gastrointestinal:  negative for abdominal pain, constipation, diarrhea, nausea, vomiting  Neurological:  negative for dizziness, headache  Data:     Past Medical History:  no change     Social History:  no change    Family History: @no change    Vitals:      I/O (24Hr): Intake/Output Summary (Last 24 hours) at 10/24/2021 0909  Last data filed at 10/23/2021 1948  Gross per 24 hour   Intake 240 ml   Output 650 ml   Net -410 ml       Labs:    Radiology:    Medications:      Allergies:      Current Meds:   Scheduled Meds:    amLODIPine  5 mg Oral Daily    [Held by provider] aspirin  81 mg Oral Daily    atorvastatin  40 mg Oral Daily    latanoprost  1 drop Both Eyes Nightly    metoprolol succinate  150 mg Oral Daily    pregabalin  75 mg Oral Daily    valsartan  160 mg Oral Daily    hydroCHLOROthiazide  12.5 mg Oral Daily    insulin lispro  0-12 Units SubCUTAneous TID WC    insulin lispro  0-6 Units SubCUTAneous Nightly    brimonidine  1 drop Both Eyes BID    And    timolol  1 drop Both Eyes BID    insulin glargine  48 Units SubCUTAneous Daily    insulin lispro  7 Units SubCUTAneous TID     sodium chloride flush  5-40 mL IntraVENous 2 times per day    enoxaparin  40 mg SubCUTAneous Daily     Continuous Infusions:    dextrose      sodium chloride       PRN Meds: morphine **OR** morphine, nitroGLYCERIN, glucose, dextrose, glucagon (rDNA), dextrose, sodium chloride flush, sodium chloride, ondansetron **OR** ondansetron, magnesium hydroxide, acetaminophen **OR** acetaminophen      Physical Examination:        BP (!) 145/73   Pulse 62   Temp 97.7 °F (36.5 °C)   Resp 14   Ht 5' 5\" (1.651 m) Comment: Patient reported height before bilateral amputations  Wt 164 lb 0.4 oz (74.4 kg)   LMP  (LMP Unknown)   SpO2 94%   BMI 27.29 kg/m²   Temp (24hrs), Av.1 °F (36.7 °C), Min:97.6 °F (36.4 °C), Max:98.6 °F (37 °C)    Recent Labs     10/23/21  1127 10/23/21  1613 10/23/21  2011 10/24/21  0555   POCGLU 163* 257* 330* 145*       Intake/Output Summary (Last 24 hours) at 10/24/2021 0909  Last data filed at 10/23/2021 1948  Gross per 24 hour   Intake 240 ml   Output 650 ml   Net -410 ml       General Appearance:  alert, well appearing, and in no acute distress  Mental status:   Head:  normocephalic, atraumatic. Eye: no icterus, redness, pupils equal and reactive, extraocular eye movements intact, conjunctiva clear  Ear: normal external ear, no discharge, hearing intact  Nose:  no drainage noted  Mouth: mucous membranes moist  Neck: supple, no carotid bruits, thyroid not palpable  Lungs: Bilateral equal air entry, clear to ausculation, no wheezing, rales or rhonchi, normal effort  Cardiovascular: normal rate, regular rhythm, no murmur, gallop, rub.   Abdomen: Soft, nontender, nondistended, normal bowel sounds, no hepatomegaly or splenomegaly  Neurologic: There are no new focal motor or sensory deficits,   Skin: No gross lesions, rashes, bruising or bleeding on exposed skin area  Extremities:  peripheral pulses palpable, no pedal edema or calf pain with palpation  Psych:             Assessment:        Primary Problem  Left renal mass    Principal Problem:    Left renal mass  Active Problems:    Diabetes mellitus, type 2    CAD s/p CABG 2001, stent June 2013    Hypertension    Diabetic nephropathy ([address]    PVD s/p Left AKA    S/P AKA (above knee amputation) bilateral (HCC)    CKD (chronic kidney disease), stage III ([address]  Resolved Problems:    * No resolved hospital problems. *       Plan:          10/24/21    Patient is being treated with IV antibiotics for pyelonephritis  Her SUSANA is improving creatinine has come down to 1.7  We will plan to give her a liter of normal saline bolus today  Continue present antibiotic  Patient's heart rate has been around 50  Cut down metoprololxl dose to 100 mg from 150      . Hospital Problems         Last Modified POA    * (Principal) Left renal mass 10/22/2021 Yes    Diabetes mellitus, type 2 10/22/2021 Yes    CAD s/p CABG 2001, stent June 2013 10/22/2021 Yes    Hypertension 10/22/2021 Yes    Diabetic nephropathy ([address] 10/22/2021 Yes    PVD s/p Left AKA 10/22/2021 Yes    S/P AKA (above knee amputation) bilateral ([address] 10/22/2021 Yes    Overview Signed 1/20/2015  1:32 PM by Akua Evangelista DO     Right aka 01/2015         CKD (chronic kidney disease), stage III ([address] 10/22/2021 Yes                         Thanks for consulting us . Will monitor vitals and clinical course , and  Optimize therapy  as needed .            Alise Vasquez MD

## 2021-10-24 NOTE — PLAN OF CARE
Problem: Skin Integrity:  Goal: Will show no infection signs and symptoms  Description: Will show no infection signs and symptoms  Outcome: Ongoing  Goal: Absence of new skin breakdown  Description: Absence of new skin breakdown  Outcome: Ongoing     Problem: Falls - Risk of:  Goal: Will remain free from falls  Description: Will remain free from falls  Outcome: Ongoing  Goal: Absence of physical injury  Description: Absence of physical injury  Outcome: Ongoing     Problem: Musculor/Skeletal Functional Status  Goal: Highest potential functional level  Outcome: Ongoing  Goal: Absence of falls  Outcome: Ongoing     Problem: Urinary Elimination:  Goal: Ability to recognize the need to void and respond appropriately will improve  Description: Ability to recognize the need to void and respond appropriately will improve  Outcome: Ongoing     Problem: Pain:  Goal: Pain level will decrease  Description: Pain level will decrease  Outcome: Ongoing  Goal: Control of acute pain  Description: Control of acute pain  Outcome: Ongoing  Goal: Control of chronic pain  Description: Control of chronic pain  Outcome: Ongoing

## 2021-10-24 NOTE — CARE COORDINATION
ONGOING DISCHARGE PLAN:    Patient is alert and oriented x4. Spoke with patient regarding discharge plan and patient confirms that plan is still to return home with   [place]. PT recommends hospital bed. Dr Zaid Polo signed DME request for hospital bed for home. DME order/ face sheet and face to face sent to [facility]. Creat 1.76 today    Left renal mass -may need biospy    New PCP Appt scheduled. Will continue to follow for additional discharge needs.     Electronically signed by Maya Espino RN on 10/24/2021 at 10:55 AM

## 2021-10-25 VITALS
DIASTOLIC BLOOD PRESSURE: 57 MMHG | WEIGHT: 164.02 LBS | RESPIRATION RATE: 16 BRPM | HEART RATE: 48 BPM | TEMPERATURE: 97.5 F | BODY MASS INDEX: 27.33 KG/M2 | HEIGHT: 65 IN | OXYGEN SATURATION: 98 % | SYSTOLIC BLOOD PRESSURE: 138 MMHG

## 2021-10-25 LAB
ANION GAP SERPL CALCULATED.3IONS-SCNC: 9 MMOL/L (ref 9–17)
BUN BLDV-MCNC: 46 MG/DL (ref 8–23)
BUN/CREAT BLD: ABNORMAL (ref 9–20)
CALCIUM SERPL-MCNC: 8.6 MG/DL (ref 8.6–10.4)
CHLORIDE BLD-SCNC: 109 MMOL/L (ref 98–107)
CO2: 26 MMOL/L (ref 20–31)
CREAT SERPL-MCNC: 1.21 MG/DL (ref 0.5–0.9)
GFR AFRICAN AMERICAN: 52 ML/MIN
GFR NON-AFRICAN AMERICAN: 43 ML/MIN
GFR SERPL CREATININE-BSD FRML MDRD: ABNORMAL ML/MIN/{1.73_M2}
GFR SERPL CREATININE-BSD FRML MDRD: ABNORMAL ML/MIN/{1.73_M2}
GLUCOSE BLD-MCNC: 125 MG/DL (ref 65–105)
GLUCOSE BLD-MCNC: 159 MG/DL (ref 70–99)
GLUCOSE BLD-MCNC: 260 MG/DL (ref 65–105)
GLUCOSE BLD-MCNC: 295 MG/DL (ref 65–105)
HCT VFR BLD CALC: 35.9 % (ref 36–46)
HEMOGLOBIN: 12 G/DL (ref 12–16)
MCH RBC QN AUTO: 28.9 PG (ref 26–34)
MCHC RBC AUTO-ENTMCNC: 33.5 G/DL (ref 31–37)
MCV RBC AUTO: 86.3 FL (ref 80–100)
NRBC AUTOMATED: ABNORMAL PER 100 WBC
PDW BLD-RTO: 13.4 % (ref 11.5–14.9)
PLATELET # BLD: 185 K/UL (ref 150–450)
PMV BLD AUTO: 7.9 FL (ref 6–12)
POTASSIUM SERPL-SCNC: 4 MMOL/L (ref 3.7–5.3)
RBC # BLD: 4.16 M/UL (ref 4–5.2)
SODIUM BLD-SCNC: 144 MMOL/L (ref 135–144)
WBC # BLD: 4.5 K/UL (ref 3.5–11)

## 2021-10-25 PROCEDURE — 97110 THERAPEUTIC EXERCISES: CPT

## 2021-10-25 PROCEDURE — 80048 BASIC METABOLIC PNL TOTAL CA: CPT

## 2021-10-25 PROCEDURE — 82947 ASSAY GLUCOSE BLOOD QUANT: CPT

## 2021-10-25 PROCEDURE — 6370000000 HC RX 637 (ALT 250 FOR IP): Performed by: INTERNAL MEDICINE

## 2021-10-25 PROCEDURE — 6360000002 HC RX W HCPCS: Performed by: NURSE PRACTITIONER

## 2021-10-25 PROCEDURE — 99239 HOSP IP/OBS DSCHRG MGMT >30: CPT | Performed by: INTERNAL MEDICINE

## 2021-10-25 PROCEDURE — 36415 COLL VENOUS BLD VENIPUNCTURE: CPT

## 2021-10-25 PROCEDURE — 85027 COMPLETE CBC AUTOMATED: CPT

## 2021-10-25 PROCEDURE — 2580000003 HC RX 258: Performed by: NURSE PRACTITIONER

## 2021-10-25 PROCEDURE — 97530 THERAPEUTIC ACTIVITIES: CPT

## 2021-10-25 PROCEDURE — 6370000000 HC RX 637 (ALT 250 FOR IP): Performed by: NURSE PRACTITIONER

## 2021-10-25 RX ORDER — ONDANSETRON 4 MG/1
4 TABLET, ORALLY DISINTEGRATING ORAL EVERY 8 HOURS PRN
Qty: 30 TABLET | Refills: 0 | Status: SHIPPED | OUTPATIENT
Start: 2021-10-25

## 2021-10-25 RX ORDER — AMLODIPINE BESYLATE 10 MG/1
10 TABLET ORAL DAILY
Qty: 30 TABLET | Refills: 3 | Status: ON HOLD | OUTPATIENT
Start: 2021-10-25 | End: 2021-11-05 | Stop reason: HOSPADM

## 2021-10-25 RX ORDER — OXYCODONE HYDROCHLORIDE AND ACETAMINOPHEN 5; 325 MG/1; MG/1
1 TABLET ORAL EVERY 6 HOURS PRN
Qty: 12 TABLET | Refills: 0 | Status: SHIPPED | OUTPATIENT
Start: 2021-10-25 | End: 2021-10-28

## 2021-10-25 RX ADMIN — OXYCODONE HYDROCHLORIDE AND ACETAMINOPHEN 1 TABLET: 5; 325 TABLET ORAL at 04:13

## 2021-10-25 RX ADMIN — OXYCODONE HYDROCHLORIDE AND ACETAMINOPHEN 1 TABLET: 5; 325 TABLET ORAL at 17:26

## 2021-10-25 RX ADMIN — VALSARTAN 160 MG: 320 TABLET, FILM COATED ORAL at 08:48

## 2021-10-25 RX ADMIN — TIMOLOL MALEATE 1 DROP: 5 SOLUTION OPHTHALMIC at 08:48

## 2021-10-25 RX ADMIN — PREGABALIN 75 MG: 75 CAPSULE ORAL at 08:48

## 2021-10-25 RX ADMIN — INSULIN LISPRO 6 UNITS: 100 INJECTION, SOLUTION INTRAVENOUS; SUBCUTANEOUS at 17:26

## 2021-10-25 RX ADMIN — BRIMONIDINE TARTRATE 1 DROP: 2 SOLUTION OPHTHALMIC at 08:48

## 2021-10-25 RX ADMIN — AMLODIPINE BESYLATE 5 MG: 5 TABLET ORAL at 08:48

## 2021-10-25 RX ADMIN — INSULIN LISPRO 7 UNITS: 100 INJECTION, SOLUTION INTRAVENOUS; SUBCUTANEOUS at 17:28

## 2021-10-25 RX ADMIN — ATORVASTATIN CALCIUM 40 MG: 40 TABLET, FILM COATED ORAL at 08:48

## 2021-10-25 RX ADMIN — SODIUM CHLORIDE, PRESERVATIVE FREE 10 ML: 5 INJECTION INTRAVENOUS at 09:06

## 2021-10-25 RX ADMIN — INSULIN LISPRO 6 UNITS: 100 INJECTION, SOLUTION INTRAVENOUS; SUBCUTANEOUS at 12:06

## 2021-10-25 RX ADMIN — INSULIN LISPRO 7 UNITS: 100 INJECTION, SOLUTION INTRAVENOUS; SUBCUTANEOUS at 12:04

## 2021-10-25 RX ADMIN — INSULIN LISPRO 7 UNITS: 100 INJECTION, SOLUTION INTRAVENOUS; SUBCUTANEOUS at 09:02

## 2021-10-25 RX ADMIN — INSULIN GLARGINE 48 UNITS: 100 INJECTION, SOLUTION SUBCUTANEOUS at 09:02

## 2021-10-25 RX ADMIN — HYDROCHLOROTHIAZIDE 12.5 MG: 12.5 CAPSULE ORAL at 08:48

## 2021-10-25 RX ADMIN — ENOXAPARIN SODIUM 40 MG: 100 INJECTION SUBCUTANEOUS at 08:49

## 2021-10-25 ASSESSMENT — PAIN SCALES - GENERAL
PAINLEVEL_OUTOF10: 0
PAINLEVEL_OUTOF10: 9
PAINLEVEL_OUTOF10: 5

## 2021-10-25 NOTE — CARE COORDINATION
Continuity of Care Form    Patient Name: Harper Patient   :  1939  MRN:  [id number]    Admit date:  10/21/2021  Discharge date:  10/25/21    Code Status Order: Full Code   Advance Directives:      Admitting Physician:  Erinn Paz MD  PCP: No primary care provider on file. Discharging Nurse: [facility] Unit/Room#: 2065/2065-01  Discharging Unit Phone Number: [phone]    Emergency Contact:   Extended Emergency Contact Information  Primary Emergency Contact: Kia Hydeon [address] Phone: [phone]  Mobile Phone: [phone]  Relation: Child  Secondary Emergency Contact: Nirav Naylor  [place] Phone: [phone]  Relation: Child    Past Surgical History:  Past Surgical History:   Procedure Laterality Date    BYPASS GRAFT      CATARACT REMOVAL      bilateral    CORONARY ANGIOPLASTY      STENT X2    CORONARY ARTERY BYPASS GRAFT      HYSTERECTOMY, TOTAL ABDOMINAL      LEG AMPUTATION THROUGH KNEE Left        Immunization History:   Immunization History   Administered Date(s) Administered    Influenza 10/10/2013    Influenza Virus Vaccine 2014, 10/31/2014    Pneumococcal Conjugate 7-valent (Jon Mingle) 10/26/2007       Active Problems:  Patient Active Problem List   Diagnosis Code    Depression F32. 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    S/P AKA (above knee amputation) bilateral ([address] Z89.611, H04.187    CKD (chronic kidney disease), stage III (HCC) N18.30    Left renal mass N28.89       Isolation/Infection:   Isolation            No Isolation          Patient Infection Status None to display            Nurse Assessment:  Last Vital Signs: BP (!) 144/58   Pulse 53   Temp 97.6 °F (36.4 °C) (Oral)   Resp 14   Ht 5' 5\" (1.651 m) Comment: Patient reported height before bilateral amputations  Wt 164 lb 0.4 oz (74.4 kg)   LMP  (LMP Unknown)   SpO2 93%   BMI 27.29 kg/m²     Last documented pain score (0-10 scale): Pain Level: 4  Last Weight:   Wt Readings from Last 1 Encounters:   10/21/21 164 lb 0.4 oz (74.4 kg)     Mental Status:  oriented and alert    IV Access:  - None    Nursing Mobility/ADLs:  Walking   Assisted  Transfer  Assisted  Bathing  Assisted  Dressing  Independent  Toileting  Assisted  Feeding  Independent  Med Admin  Independent  Med Delivery   whole             Safety Concerns: At Risk for Falls    Impairments/Disabilities:      Amputation - bilat BKA    Nutrition Therapy:  Current Nutrition Therapy:   - Oral Diet:  General    Routes of Feeding: Oral  Liquids: No Restrictions  Daily Fluid Restriction: no  Last Modified Barium Swallow with Video (Video Swallowing Test): not done    Treatments at the Time of Hospital Discharge:   Respiratory Treatments: see mar  Oxygen Therapy:  is not on home oxygen therapy.   Ventilator:    - No ventilator support    Rehab Therapies: Physical Therapy and Occupational Therapy  Weight Bearing Status/Restrictions: No weight bearing restirctions  Other Medical Equipment (for information only, NOT a DME order):  wheelchair  Other Treatments: skilled nursing assessment, medication education and monitoring    Patient's personal belongings (please select all that are sent with patient):  None    RN SIGNATURE:  Electronically signed by Jasmyn Finley RN on 10/25/21 at 1:41 PM EDT    CASE MANAGEMENT/SOCIAL WORK SECTION    Inpatient Status Date: 10/21/21    Readmission Risk Assessment Score:  Readmission Risk              Risk of Unplanned Readmission:  13           Discharging to Facility/ Agency   · Name: · Address:  · Phone:  · Fax:    Dialysis Facility (if applicable)   · Name:  · Address:  · Dialysis Schedule:  · Phone:  · Fax:    / signature: Electronically signed by Pastora Gudino RN on 10/25/21 at 1:41 PM EDT    PHYSICIAN SECTION    Prognosis: Fair    Condition at Discharge: Stable    Rehab Potential (if transferring to Rehab): Fair    Recommended Labs or Other Treatments After Discharge: see above    Physician Certification: I certify the above information and transfer of Brittany Cha  is necessary for the continuing treatment of the diagnosis listed and that she requires [address] for less 30 days. Update Admission H&P: No change in H&P    PHYSICIAN SIGNATURE:  Electronically signed by Villa Feliz MD on 10/25/21 at 12:33 PM EDT    CONTINUE these medications which have CHANGED or have new prescriptions    Medication Dose   oxyCODONE-acetaminophen (PERCOCET) 5-325 MG per tablet 1 tablet   Take 1 tablet by mouth every 6 hours as needed for Pain for up to 3 days. Quantity: 12 tablet Refills: 0       Comments: Reduce doses taken as pain becomes manageable      ondansetron (ZOFRAN ODT) 4 MG disintegrating tablet 4 mg   Take 1 tablet by mouth every 8 hours as needed for Nausea or Vomiting   Quantity: 30 tablet Refills: 0       amLODIPine (NORVASC) 10 MG tablet 10 mg   Take 1 tablet by mouth daily   Quantity: 30 tablet Refills: 3           CONTINUE these medications which have NOT CHANGED    Medication Dose   insulin lispro protamine & lispro (HUMALOG MIX) (75-25) 100 UNIT per ML SUSP injection vial 35 Units   Inject 35 Units into the skin nightly       metoprolol succinate (TOPROL XL) 100 MG extended release tablet 150 mg   Take 150 mg by mouth daily       pregabalin (LYRICA) 75 MG capsule 75 mg   Take 75 mg by mouth daily.        insulin lispro protamine & lispro (HUMALOG MIX 75/25 KWIKPEN) (75-25) 100 UNIT per ML SUPN injection pen    Inject 45 units under the skin in the morning and 35 units at night   Quantity: 5 pen Refills: 0       atorvastatin (LIPITOR) 40 MG tablet 40 mg   Take 1 tablet by mouth daily. Quantity: 30 tablet Refills: 3       acetaminophen (TYLENOL 8 HOUR) 650 MG CR tablet 650 mg   Take 1 tablet by mouth every 8 hours as needed for Pain. Quantity: 90 tablet Refills: 3       nitroGLYCERIN (NITROSTAT) 0.3 MG SL tablet 0.3 mg   Place 1 tablet under the tongue as needed.    Quantity: 100 tablet Refills: 2       B-D UF III MINI PEN NEEDLES 31G X 5 MM MISC    use daily   Quantity: 60 each Refills: 3       Alcohol Swabs (B-D SINGLE USE SWABS REGULAR) PADS    use as directed   Quantity: 100 each Refills: 0       TRUETRACK TEST strip    TEST two to three times a day   Quantity: 100 each Refills: 3       TRUEPLUS LANCETS 33G MISC    use two to three times a day   Quantity: 100 each Refills: 3       Blood Glucose Monitoring Suppl (BLOOD GLUCOSE METER) KIT    Test 2-3 times daily Dx: 250.00  Diabetes Mellitus 2 Insulin Dependent   Quantity: 1 kit Refills: 0           STOP taking these previous medications    Medication Dose Reason for Stopping Comments   (STOP TAKING) valsartan (DIOVAN) 160 MG tablet 160 mg           (STOP TAKING) hydroCHLOROthiazide (MICROZIDE) 12.5 MG capsule 12.5 mg           (STOP TAKING) insulin detemir (LEVEMIR FLEXTOUCH) 100 UNIT/ML injection pen 45 Units           (STOP TAKING) gabapentin (NEURONTIN) 100 MG capsule            (STOP TAKING) aspirin (ASPIRIN LOW DOSE) 81 MG EC tablet 81 mg           (STOP TAKING) citalopram (CELEXA) 20 MG tablet 20 mg           (STOP TAKING) metFORMIN (GLUCOPHAGE) 500 MG tablet 500 mg           (STOP TAKING) losartan (COZAAR) 100 MG tablet 100 mg           (STOP TAKING) furosemide (LASIX) 20 MG tablet 20 mg           (STOP TAKING) sennosides-docusate sodium (SENOKOT-S) 8.6-50 MG tablet 1 tablet           (STOP TAKING) clopidogrel (PLAVIX) 75 MG tablet 75 mg           (STOP TAKING) famotidine (PEPCID) 40 MG tablet 40 mg

## 2021-10-25 NOTE — CARE COORDINATION
ONGOING DISCHARGE PLAN:    Patient is alert and oriented x4. Spoke with patient regarding discharge plan and patient confirms that plan is still home with [facility] [phone]  [facility]. Notified Marianela Mcduffie from [facility] that pt will be d/c'ed home today. Faxed [facility] and d/c med list to [facility]. Pt states her son will be able to come pick her up. Explained to patient that she will need to call [facility] to have hospital bed delivered when she gets home. Phone number for [facility] provided on AVS.    Will continue to follow for additional discharge needs.     Electronically signed by Miranda Moralez RN on 10/25/2021 at 1:47 PM

## 2021-10-25 NOTE — PROGRESS NOTES
Physical Therapy  Facility/Department: [facility] MED SURG  Daily Treatment Note  NAME: Remberto Bruno  : 1939  MRN: [id number]    Date of Service: 10/25/2021    Discharge Recommendations:  Patient would benefit from continued therapy after discharge   PT Equipment Recommendations  Equipment Needed: Yes  Mobility Devices: [place] Bed : Electric - Full (Head of Bed); Electric - Full  (Height of Bed); Bed Rails - Quadrant    Assessment   Body structures, Functions, Activity limitations: Decreased functional mobility ; Decreased strength;Decreased balance;Decreased endurance  History: B AKA  REQUIRES PT FOLLOW UP: Yes  Activity Tolerance  Activity Tolerance: Patient Tolerated treatment well     Patient Diagnosis(es): The encounter diagnosis was Left renal mass. has a past medical history of Amputation of leg ([address] CAD (coronary artery disease), CAD (coronary artery disease), Chronic back pain, Coughing, Depression, Diabetes, Diphtheria, Full dentures, GERD (gastroesophageal reflux disease), Glaucoma, Headache(784.0), History of blood transfusion, Hx of blood clots, Hyperlipidemia, Hypertension, IDDM (insulin dependent diabetes mellitus), Leg pain, bilateral, LONG TERM ANTICOAGULENT USE, Neuropathy, Numbness of toes, Obesity, Osteoarthritis, Peripheral vascular disease ([address] Rash, skin, Type II or unspecified type diabetes mellitus without mention of complication, not stated as uncontrolled, Wears dentures, Wears glasses, and Wears glasses. has a past surgical history that includes Bypass Graft (); Hysterectomy, total abdominal; Cataract removal (); Coronary artery bypass graft (); Coronary angioplasty; and Leg amputation through knee (Left).     Restrictions  Restrictions/Precautions  Restrictions/Precautions: General Precautions, Fall Risk  Required Braces or Orthoses?: No  Implants present? :  (Cardiac stents)  Position Activity Restriction  Other position/activity restrictions: Up as tolerated, B BKA  Subjective   General  Additional Pertinent Hx: The patient is a [de-identified] y.o. female who presents with left sided flank pain and dysuria. She has been having pain for about 1 month. She initially had right sided pain, but then the pain moved to the left. She has not had any fevers. She had hematuria about 5 years ago. She had a CT, which showed a possible left renal mass. Urology following-might need Biopsy/cystoscopy. Response To Previous Treatment: Patient with no complaints from previous session. Family / Caregiver Present: No  Subjective  Subjective: Pt long sitting up in bed upon arrival and at end of session. Pt is pleasant and cooperative for therapy. Pt reports performing all UE and core exercises in long sitting up in bed everyday. Orientation  Orientation  Overall Orientation Status: Within Functional Limits    Objective   Bed mobility  Rolling to Left: Stand by assistance (bed rail)  Rolling to Right: Stand by assistance (bed rail)  Supine to Sit: Stand by assistance  Sit to Supine: Stand by assistance  Scooting: Stand by assistance     Transfers  Sit to Stand:  (NA)  Stand to sit:  (NA)  Lateral Transfers:  (Pt able to scoot laterally/forward and backwards SBA)  Comment: BS transfers-pt turns around and scoot backwards onto [facility] SBA, therapist holding onto [facility] to make sure it does not slide away.      Other exercises  Other exercises?: Yes  Other exercises 1: bed mobility - practicing rolling L and R using bed rail and supine <> sit  Other exercises 2: long sitting in bed B UE, B LE, and core exercises in all planes ~5 mins total     Goals  Short term goals  Time Frame for Short term goals: 5 to 6 vsits  Short term goal 1: Pt to demonstrate rolling independently in bed to chage psotion  Short term goal 2: Pt able to perform long sitting/supine<>sit independently  Short term goal 3:  Pt able to demonstrate transfers at SBA from level surfaces  Short term goal 4: Pt able to perfrom transfers at [facility] on sight incline surfaces  Short term goal 5: Pt to tolerate activity at EOB to imprve core strengthening  to improve transfers skills at home. Patient Goals   Patient goals : Be able to be independent.      Plan    Plan  Times per week: 5 x/week  Current Treatment Recommendations: Transfer Training, Functional Mobility Training, Balance Training, Strengthening, Endurance Training, Safety Education & Training, Patient/Caregiver Education & Training, Equipment Evaluation, Education, & procurement, Home Exercise Program  Safety Devices  Type of devices: Left in bed, Gait belt, Call light within reach, Bed alarm in place     Therapy Time   Individual Concurrent Group Co-treatment   Time In 0919         Time Out 0942         Minutes [address], PTA

## 2021-10-25 NOTE — PROGRESS NOTES
CLINICAL PHARMACY NOTE: MEDS TO BEDS    Total # of Prescriptions Filled: 2   The following medications were delivered to the patient:  · Amlodipine 10mg  · Ondansetron 4mg    Additional Documentation:  Delivered medications to patients room

## 2021-10-26 DIAGNOSIS — S88.911A TRAUMATIC AMPUTATION OF BOTH LOWER EXTREMITIES WITH COMPLICATION, INITIAL ENCOUNTER (HCC): Primary | ICD-10-CM

## 2021-10-26 DIAGNOSIS — S88.912A TRAUMATIC AMPUTATION OF BOTH LOWER EXTREMITIES WITH COMPLICATION, INITIAL ENCOUNTER (HCC): Primary | ICD-10-CM

## 2021-10-26 NOTE — CARE COORDINATION
[id number]        [facility]                                    Req/Control # [Problem retrieving Specimen ID]                                   Order Date:  Oct 26, 2021  [id number]                                          Patient Information      Name:  Bharat Hill  :  1939  Age:  [de-identified] y.o. Address:  [address]   Zip:  [address]  PCP: No primary care provider on file. Sex:  F  SSN: [id number]  Home Phone: [phone]  Work Phone:    Patient MRN:  [id number]    Alt Patient ID:  [id number]  PCP Phone: None       Authorizing Provider Information       AUTHORIZING PROVIDER: Jody Catherine MD  Physician ID: [id number]  NPI:  [id number]  Site:   Address: [address] [place], [address]  Phone: [phone]  Fax: [phone]              EQUIPMENT ORDERED  DME Order for [address] [employer] [place] [employer] [place] as OP [DME03] (ORD   #:   [id number]) Priority  Routine Class  Hospital Performed        Associated Diagnosis:  Traumatic amputation of both lower extremities with complication, initial encounter ([facility] (X04.901I,F11.304C [ICD-10-CM])        Comments:    You must complete the order parameters below and add the medical necessity documentation for this DME in a separate note.     [employer] hospital bed with mattress and any type rails     Current patient weight:    Current patient height:    Diagnosis: lucio amputee above knee lucio  Length of need: Lifetime            Scheduling Instructions:                                 Specimen Source             Collection Date    Collection Time    Order Status    Expected Date                Electronically Signed By  Jody Catherine MD Date  Oct 26, 2021  9:40 AM              Responsible Party Candace Gramajo     Guar-ActID   Relationship Account Type Home Phone   Aki Sampson - [phone]* [address], [address] Self P/F [phone]   Employer   Work [address] [place] [address] [place] Information     Primary Insurance  Insurance/Subscriber ID:  [id number]  Subscriber Name:  Jose Arriaga              Relationship to Patient: SelfSigned ABN: N    Payor Name:  Santana Álvarez   Plan:  LANCE BALLARD ESSENTIAL/PLUS   Group: [id number]  Worker's Comp Date of Injury:

## 2021-10-26 NOTE — PROGRESS NOTES
Physician Progress Note      Quinton JONES #:                  [id number]  :                       1939  ADMIT DATE:       10/21/2021 2:36 PM  [address] [place] DATE:        10/25/2021 6:00 PM  RESPONDING  PROVIDER #:        Sony Tipton MD          QUERY TEXT:    Patient admitted with a left sided renal mass suspected to be transitional   cell carcinoma . Noted documentation of pyelonephritis in 10/24 Primary   Progress Note. In order to support the diagnosis of pyelonephritis please   include additional clinical indicators in your documentation. Or please   document if the diagnosis of pyelonephritis has been ruled out after further   study. The medical record reflects the following:  Risk Factors: [de-identified] y.o. female with extensive PMH, c/o dysuria and flank pain. Being evaluated for renal mass suspected to be cancer  Clinical Indicators: Patient with c/o flank pain with dysuria and frequency x   1 month;  10/24 progress note: \"patient is being treated with IV antibiotics   for pyelonephritis. \"  10/21 UA: few bacteria without leukocyte esterase or   nitrites and only 2-5 WBCs. Urine Culture not performed. 10/21 CT abd/ pelvis   without findings consistent with pyelonephritis;  patient afebrile with normal   WBC throughout admission  Treatment: Pt has not received any oral or IV antibiotics this admission. Options provided:  -- Pyelonephritis was ruled out  -- Pyelonephritis present as evidenced by, Please document evidence. -- Other - I will add my own diagnosis  -- Disagree - Not applicable / Not valid  -- Disagree - Clinically unable to determine / Unknown  -- Refer to Clinical Documentation Reviewer    PROVIDER RESPONSE TEXT:    Pyelonephritis was ruled out after study.     Query created by: Jens Lama on 10/25/2021 4:15 PM      Electronically signed by:  Sony Tipton MD 10/26/2021 8:52 AM

## 2021-11-04 ENCOUNTER — APPOINTMENT (OUTPATIENT)
Dept: CT IMAGING | Age: 81
End: 2021-11-04
Payer: MEDICARE

## 2021-11-04 ENCOUNTER — APPOINTMENT (OUTPATIENT)
Dept: CT IMAGING | Age: 81
DRG: 066 | End: 2021-11-04
Payer: MEDICARE

## 2021-11-04 ENCOUNTER — HOSPITAL ENCOUNTER (INPATIENT)
Age: 81
LOS: 1 days | Discharge: HOME HEALTH | DRG: 066 | End: 2021-11-05
Attending: EMERGENCY MEDICINE | Admitting: INTERNAL MEDICINE
Payer: MEDICARE

## 2021-11-04 ENCOUNTER — APPOINTMENT (OUTPATIENT)
Dept: GENERAL RADIOLOGY | Age: 81
DRG: 066 | End: 2021-11-04
Payer: MEDICARE

## 2021-11-04 ENCOUNTER — HOSPITAL ENCOUNTER (EMERGENCY)
Age: 81
Discharge: ANOTHER HOSPITAL | End: 2021-11-04
Payer: MEDICARE

## 2021-11-04 ENCOUNTER — HOSPITAL ENCOUNTER (OUTPATIENT)
Age: 81
Setting detail: SPECIMEN
Discharge: HOME | End: 2021-11-04
Payer: MEDICARE

## 2021-11-04 ENCOUNTER — APPOINTMENT (OUTPATIENT)
Dept: MRI IMAGING | Age: 81
DRG: 066 | End: 2021-11-04
Payer: MEDICARE

## 2021-11-04 DIAGNOSIS — I63.9 CEREBROVASCULAR ACCIDENT (CVA), UNSPECIFIED MECHANISM (HCC): Primary | ICD-10-CM

## 2021-11-04 LAB
ABSOLUTE EOS #: 0.1 K/UL (ref 0–0.4)
ABSOLUTE IMMATURE GRANULOCYTE: ABNORMAL K/UL (ref 0–0.3)
ABSOLUTE LYMPH #: 1.3 K/UL (ref 1–4.8)
ABSOLUTE MONO #: 0.4 K/UL (ref 0.1–1.3)
ALBUMIN SERPL-MCNC: 3.8 G/DL (ref 3.5–5.2)
ALBUMIN/GLOBULIN RATIO: ABNORMAL (ref 1–2.5)
ALP BLD-CCNC: 111 U/L (ref 35–104)
ALT SERPL-CCNC: 12 U/L (ref 5–33)
ANION GAP SERPL CALCULATED.3IONS-SCNC: 8 MMOL/L (ref 9–17)
AST SERPL-CCNC: 14 U/L
BASOPHILS # BLD: 0 % (ref 0–2)
BASOPHILS ABSOLUTE: 0 K/UL (ref 0–0.2)
BILIRUB SERPL-MCNC: 0.32 MG/DL (ref 0.3–1.2)
BUN BLDV-MCNC: 21 MG/DL (ref 8–23)
BUN/CREAT BLD: ABNORMAL (ref 9–20)
CALCIUM SERPL-MCNC: 10 MG/DL (ref 8.6–10.4)
CHLORIDE BLD-SCNC: 107 MMOL/L (ref 98–107)
CO2: 27 MMOL/L (ref 20–31)
CREAT SERPL-MCNC: 1.09 MG/DL (ref 0.5–0.9)
DIFFERENTIAL TYPE: ABNORMAL
EOSINOPHILS RELATIVE PERCENT: 2 % (ref 0–4)
GFR AFRICAN AMERICAN: 59 ML/MIN
GFR NON-AFRICAN AMERICAN: 48 ML/MIN
GFR SERPL CREATININE-BSD FRML MDRD: ABNORMAL ML/MIN/{1.73_M2}
GFR SERPL CREATININE-BSD FRML MDRD: ABNORMAL ML/MIN/{1.73_M2}
GLUCOSE BLD-MCNC: 145 MG/DL (ref 65–105)
GLUCOSE BLD-MCNC: 155 MG/DL
GLUCOSE BLD-MCNC: 155 MG/DL (ref 65–105)
GLUCOSE BLD-MCNC: 182 MG/DL (ref 70–99)
HCT VFR BLD CALC: 38.7 % (ref 36–46)
HEMOGLOBIN: 13 G/DL (ref 12–16)
IMMATURE GRANULOCYTES: ABNORMAL %
INR BLD: 1.1
LYMPHOCYTES # BLD: 20 % (ref 24–44)
MCH RBC QN AUTO: 29.2 PG (ref 26–34)
MCHC RBC AUTO-ENTMCNC: 33.5 G/DL (ref 31–37)
MCV RBC AUTO: 87 FL (ref 80–100)
MONOCYTES # BLD: 6 % (ref 1–7)
NRBC AUTOMATED: ABNORMAL PER 100 WBC
PDW BLD-RTO: 13.9 % (ref 11.5–14.9)
PLATELET # BLD: 215 K/UL (ref 150–450)
PLATELET ESTIMATE: ABNORMAL
PMV BLD AUTO: 8.2 FL (ref 6–12)
POTASSIUM SERPL-SCNC: 4.3 MMOL/L (ref 3.7–5.3)
PROTHROMBIN TIME: 13.8 SEC (ref 11.8–14.6)
RBC # BLD: 4.44 M/UL (ref 4–5.2)
RBC # BLD: ABNORMAL 10*6/UL
SEG NEUTROPHILS: 72 % (ref 36–66)
SEGMENTED NEUTROPHILS ABSOLUTE COUNT: 4.4 K/UL (ref 1.3–9.1)
SODIUM BLD-SCNC: 142 MMOL/L (ref 135–144)
TOTAL PROTEIN: 7.2 G/DL (ref 6.4–8.3)
TROPONIN INTERP: ABNORMAL
TROPONIN T: ABNORMAL NG/ML
TROPONIN, HIGH SENSITIVITY: 16 NG/L (ref 0–14)
WBC # BLD: 6.2 K/UL (ref 3.5–11)
WBC # BLD: ABNORMAL 10*3/UL

## 2021-11-04 PROCEDURE — A0427 ALS1-EMERGENCY: HCPCS

## 2021-11-04 PROCEDURE — 85025 COMPLETE CBC W/AUTO DIFF WBC: CPT

## 2021-11-04 PROCEDURE — 99285 EMERGENCY DEPT VISIT HI MDM: CPT

## 2021-11-04 PROCEDURE — G0426 INPT/ED TELECONSULT50: HCPCS | Performed by: PSYCHIATRY & NEUROLOGY

## 2021-11-04 PROCEDURE — 84484 ASSAY OF TROPONIN QUANT: CPT

## 2021-11-04 PROCEDURE — 82947 ASSAY GLUCOSE BLOOD QUANT: CPT

## 2021-11-04 PROCEDURE — 85610 PROTHROMBIN TIME: CPT

## 2021-11-04 PROCEDURE — 70496 CT ANGIOGRAPHY HEAD: CPT

## 2021-11-04 PROCEDURE — 6370000000 HC RX 637 (ALT 250 FOR IP)

## 2021-11-04 PROCEDURE — 2580000003 HC RX 258: Performed by: EMERGENCY MEDICINE

## 2021-11-04 PROCEDURE — 71045 X-RAY EXAM CHEST 1 VIEW: CPT

## 2021-11-04 PROCEDURE — 99223 1ST HOSP IP/OBS HIGH 75: CPT | Performed by: INTERNAL MEDICINE

## 2021-11-04 PROCEDURE — 70450 CT HEAD/BRAIN W/O DYE: CPT

## 2021-11-04 PROCEDURE — 6360000002 HC RX W HCPCS

## 2021-11-04 PROCEDURE — 70551 MRI BRAIN STEM W/O DYE: CPT

## 2021-11-04 PROCEDURE — 1200000000 HC SEMI PRIVATE

## 2021-11-04 PROCEDURE — 80053 COMPREHEN METABOLIC PANEL: CPT

## 2021-11-04 PROCEDURE — A0425 GROUND MILEAGE: HCPCS

## 2021-11-04 PROCEDURE — 6370000000 HC RX 637 (ALT 250 FOR IP): Performed by: EMERGENCY MEDICINE

## 2021-11-04 PROCEDURE — 6360000004 HC RX CONTRAST MEDICATION: Performed by: EMERGENCY MEDICINE

## 2021-11-04 PROCEDURE — 4A03X5D MEASUREMENT OF ARTERIAL FLOW, INTRACRANIAL, EXTERNAL APPROACH: ICD-10-PCS | Performed by: INTERNAL MEDICINE

## 2021-11-04 PROCEDURE — 36415 COLL VENOUS BLD VENIPUNCTURE: CPT

## 2021-11-04 PROCEDURE — 93005 ELECTROCARDIOGRAM TRACING: CPT | Performed by: EMERGENCY MEDICINE

## 2021-11-04 RX ORDER — 0.9 % SODIUM CHLORIDE 0.9 %
80 INTRAVENOUS SOLUTION INTRAVENOUS ONCE
Status: COMPLETED | OUTPATIENT
Start: 2021-11-04 | End: 2021-11-04

## 2021-11-04 RX ORDER — HYDROCHLOROTHIAZIDE 12.5 MG/1
12.5 TABLET ORAL DAILY
COMMUNITY
End: 2021-11-04

## 2021-11-04 RX ORDER — ATORVASTATIN CALCIUM 80 MG/1
80 TABLET, FILM COATED ORAL NIGHTLY
Status: DISCONTINUED | OUTPATIENT
Start: 2021-11-04 | End: 2021-11-05 | Stop reason: HOSPADM

## 2021-11-04 RX ORDER — CLOPIDOGREL BISULFATE 75 MG/1
75 TABLET ORAL DAILY
Status: DISCONTINUED | OUTPATIENT
Start: 2021-11-05 | End: 2021-11-05 | Stop reason: HOSPADM

## 2021-11-04 RX ORDER — SODIUM CHLORIDE 0.9 % (FLUSH) 0.9 %
10 SYRINGE (ML) INJECTION PRN
Status: DISCONTINUED | OUTPATIENT
Start: 2021-11-04 | End: 2021-11-05 | Stop reason: HOSPADM

## 2021-11-04 RX ORDER — ASPIRIN 325 MG
325 TABLET ORAL ONCE
Status: COMPLETED | OUTPATIENT
Start: 2021-11-04 | End: 2021-11-04

## 2021-11-04 RX ORDER — OXYCODONE HYDROCHLORIDE AND ACETAMINOPHEN 5; 325 MG/1; MG/1
1 TABLET ORAL ONCE
Status: COMPLETED | OUTPATIENT
Start: 2021-11-04 | End: 2021-11-04

## 2021-11-04 RX ORDER — VALSARTAN 160 MG/1
160 TABLET ORAL DAILY
COMMUNITY
End: 2021-11-04

## 2021-11-04 RX ORDER — CLOPIDOGREL BISULFATE 75 MG/1
300 TABLET ORAL ONCE
Status: COMPLETED | OUTPATIENT
Start: 2021-11-04 | End: 2021-11-04

## 2021-11-04 RX ORDER — PREGABALIN 75 MG/1
75 CAPSULE ORAL DAILY
Status: DISCONTINUED | OUTPATIENT
Start: 2021-11-04 | End: 2021-11-05 | Stop reason: HOSPADM

## 2021-11-04 RX ORDER — SODIUM CHLORIDE 0.9 % (FLUSH) 0.9 %
10 SYRINGE (ML) INJECTION PRN
Status: DISCONTINUED | OUTPATIENT
Start: 2021-11-04 | End: 2021-11-04

## 2021-11-04 RX ADMIN — IOPAMIDOL 75 ML: 755 INJECTION, SOLUTION INTRAVENOUS at 11:13

## 2021-11-04 RX ADMIN — SODIUM CHLORIDE, PRESERVATIVE FREE 10 ML: 5 INJECTION INTRAVENOUS at 11:13

## 2021-11-04 RX ADMIN — ASPIRIN 325 MG ORAL TABLET 325 MG: 325 PILL ORAL at 10:12

## 2021-11-04 RX ADMIN — SODIUM CHLORIDE 80 ML: 9 INJECTION, SOLUTION INTRAVENOUS at 11:12

## 2021-11-04 RX ADMIN — OXYCODONE HYDROCHLORIDE AND ACETAMINOPHEN 1 TABLET: 5; 325 TABLET ORAL at 13:26

## 2021-11-04 RX ADMIN — CLOPIDOGREL BISULFATE 300 MG: 75 TABLET ORAL at 10:12

## 2021-11-04 ASSESSMENT — ENCOUNTER SYMPTOMS
ABDOMINAL PAIN: 1
ABDOMINAL DISTENTION: 0
SORE THROAT: 0
DIARRHEA: 0
EYE PAIN: 0
WHEEZING: 0
NAUSEA: 0
ABDOMINAL PAIN: 0
SHORTNESS OF BREATH: 0
BACK PAIN: 0
CHEST TIGHTNESS: 0
EYE REDNESS: 0
CONSTIPATION: 0
COUGH: 0
VOMITING: 0

## 2021-11-04 ASSESSMENT — PAIN SCALES - GENERAL
PAINLEVEL_OUTOF10: 8
PAINLEVEL_OUTOF10: 8
PAINLEVEL_OUTOF10: 10

## 2021-11-04 NOTE — ED NOTES
Access center called back with neuro-interventionist from [facility], Kristie Ball, at [phone]; consult was completed at this time.      Go Vocab  11/04/21 6682

## 2021-11-04 NOTE — FLOWSHEET NOTE
Writer responded to Stroke Alert; patient welcomed visit and provided medical update; patient talked about her chronic medical issues and lack of support from her children; writer attempted to locate patient's children in ED lobby but was told that they had left the building; patient declined contacting family ; welcomed prayer;      11/04/21 1211   Encounter Summary   Services provided to: Patient   Referral/Consult From: Multi-disciplinary team   Support System Children   Continue Visiting   (11/4/21)   Complexity of Encounter Moderate   Length of Encounter 15 minutes   Spiritual Assessment Completed Yes   Crisis   Type Stroke Alert   Assessment Approachable; Hopeful;Coping;Helplessness   Intervention Active listening;Explored feelings, thoughts, concerns;Prayer;Sustaining presence/ Ministry of presence; Discussed illness/injury and it's impact; Discussed belief system/Oriental orthodox practices/max   Outcome Comfort;Expressed gratitude;Engaged in conversation;Expressed feelings/needs/concerns;Coping; Hopeful;Receptive

## 2021-11-04 NOTE — VIRTUAL HEALTH
Consults  Patient Location:  [address] [place] [address] ED    Provider Location ([facility]/State): [place], [place]    This virtual visit was conducted via interactive/real-time audio/video. [address] [facility] Stroke and Vascular Neurology Consult for  [facility] Stroke Alert through [address] @ 9:47am  11/4/2021 11:28 AM  Pt Name: Reynaldo Lau  MRN: [id number]  YOB: 1939  Date of evaluation: 11/4/2021  Primary Care Physician: No primary care provider on file. Reason for Evaluation: Stroke Evaluation with Discussion with Ed or primary team with Telemedicine and stroke evaluation with Review of imaging and labs     Reynaldo Lau is a [de-identified] y.o. female with PVD, B AKA due to PVD, DM, HTN, she woke up this AM around 9, and complaint of left hand feeling very weak, [facility] was called and pt improved on her way to [facility]. By the time I saw her, pt stated that her left arm weakness almost resolved but still feels numb    LKW: yesterday night  NIH:  1  - left arm numbness    Allergies  has No Known Allergies. Medications  Prior to Admission medications    Medication Sig Start Date End Date Taking? Authorizing Provider   ondansetron (ZOFRAN ODT) 4 MG disintegrating tablet Take 1 tablet by mouth every 8 hours as needed for Nausea or Vomiting 10/25/21   Cruz Redd MD   amLODIPine (NORVASC) 10 MG tablet Take 1 tablet by mouth daily 10/25/21   Cruz Redd MD   insulin lispro protamine & lispro (HUMALOG MIX) (75-25) 100 UNIT per ML SUSP injection vial Inject 35 Units into the skin nightly    Historical Provider, MD   metoprolol succinate (TOPROL XL) 100 MG extended release tablet Take 150 mg by mouth daily    Historical Provider, MD   pregabalin (LYRICA) 75 MG capsule Take 75 mg by mouth daily.     Historical Provider, MD   insulin lispro protamine & lispro (HUMALOG MIX 75/25 KWIKPEN) (75-25) 100 UNIT per ML SUPN injection pen Inject 45 units under the skin in the morning and 35 units at night  Patient taking differently: Inject 45 Units into the skin daily (with breakfast) Inject 45 units under the skin in the morning and 35 units at night 10/24/20   Do Alvarado,    B-D UF III MINI PEN NEEDLES 31G X 5 MM MISC use daily 6/18/15   Naty Marie MD   Alcohol Swabs (B-D SINGLE USE SWABS REGULAR) PADS use as directed 5/1/15   Jean Flannery MD   TRUETRACK TEST strip TEST two to three times a day 3/26/15   Naty Marie MD   TRUEPLUS LANCETS 33G MISC use two to three times a day 3/26/15   Naty Marie MD   atorvastatin (LIPITOR) 40 MG tablet Take 1 tablet by mouth daily. 3/23/15   Naty Marie MD   acetaminophen (TYLENOL 8 HOUR) 650 MG CR tablet Take 1 tablet by mouth every 8 hours as needed for Pain. 3/23/15   Naty Marie MD   Blood Glucose Monitoring Suppl (BLOOD GLUCOSE METER) KIT Test 2-3 times daily Dx: 250.00  Diabetes Mellitus 2 Insulin Dependent 3/23/15   Naty Marie MD   nitroGLYCERIN (NITROSTAT) 0.3 MG SL tablet Place 1 tablet under the tongue as needed. 9/12/14   Naty Marie MD   brimonidine-timolol (COMBIGAN) 0.2-0.5 % ophthalmic solution Place 1 drop into both eyes every 12 hours. Historical Provider, MD   latanoprost (XALATAN) 0.005 % ophthalmic solution Place 1 drop into both eyes nightly.     Historical Provider, MD    Scheduled Meds:   sodium chloride  80 mL IntraVENous Once     Continuous Infusions:  PRN Meds:.sodium chloride flush  Past Medical History   has a past medical history of Amputation of leg ([facility] CAD (coronary artery disease), CAD (coronary artery disease), Chronic back pain, Coughing, Depression, Diabetes, Diphtheria, Full dentures, GERD (gastroesophageal reflux disease), Glaucoma, Headache(784.0), History of blood transfusion, Hx of blood clots, Hyperlipidemia, Hypertension, IDDM (insulin dependent diabetes mellitus), Leg pain, bilateral, LONG TERM ANTICOAGULENT USE, Neuropathy, Numbness of toes, Obesity, Osteoarthritis, Peripheral vascular disease ([facility] Rash, skin, Type II or unspecified type diabetes mellitus without mention of complication, not stated as uncontrolled, Wears dentures, Wears glasses, and Wears glasses. Social History  Social History     Socioeconomic History    Marital status: Single     Spouse name: Not on file    Number of children: Not on file    Years of education: Not on file    Highest education level: Not on file   Occupational History    Not on file   Tobacco Use    Smoking status: Former Smoker    Smokeless tobacco: Never Used   Substance and Sexual Activity    Alcohol use: No    Drug use: No    Sexual activity: Not on file   Other Topics Concern    Not on file   Social History Narrative    Not on file     Social Determinants of Health     Financial Resource Strain:     Difficulty of Paying Living Expenses:    Food Insecurity:     Worried About [address] in the Last Year:     [address] [employer] Benefits in the Last Year:    Transportation Needs:     Lack of Transportation (Medical):      Lack of Transportation (Non-Medical):    Physical Activity:     Days of Exercise per Week:     Minutes of Exercise per Session:    Stress:     Feeling of Stress :    Social Connections:     Frequency of Communication with Friends and Family:     Frequency of Social Gatherings with Friends and Family:     Attends Adventism Services:     Active Member of Clubs or Organizations:     Attends Club or Organization Meetings:     Marital Status:    Intimate Partner Violence:     Fear of Current or Ex-Partner:     Emotionally Abused:     Physically Abused:     Sexually Abused:      Family History      Problem Relation Age of Onset    Cancer Mother         stomach    High Blood Pressure Mother     Heart Disease Sister     Heart Disease Brother        OBJECTIVE  BP (!) 162/66   Pulse 83   Temp 98.6 °F (37 °C)   Resp 11   Wt 165 lb (74.8 kg)   LMP  (LMP Unknown)   SpO2 97%   BMI 27.46 kg/m²        Pre-Morbid mRS: 0    Imaging:  Images were personally reviewed with SUDARSHAN. AI used to review images including:  CT brain without contrast: nothing acute   CTA imaging:no LVO, moderate ICA stenosis    Assessment    [de-identified]year old woman with PVD and B AKA, HTN, DM, now with acute on set of left arm weakness/numbness. Recommendations:  1. NIH 1  2. Recommend Inpatient Neurology Consult for further assessment and evaluation   3.  consider . [id number]  4. Not a tPA candidate due to symptoms improving and low non eloquent NIHSS and out of window  5. Pt most likely had a stroke, will load pt with ASA 325mg x 1 dose and plavix 300mg x 1 dose follow by aspirin 81mg daily lifelong and plavix 75mg for 90 days for intracranial stenosis (radiology read as critical, in my opinion, this is an ICAD of moderate stenosis, no interventional needed)  6.  lipitor 80mg daily with goal LDL less than 70  7. Keep BP below 200/100 for the first day and after that gradually lower it over a week  8. PT/OT consult        Discussed with ED Physician    At least 55 min of Telemedicine and time in conversation directly with ED staff and physician for the patient who is in imminent and life threatening deterioration without further treatment and evaluation. This Virtual Visit was conducted with patient's (and/or legal guardian's) consent, to provide telestroke consultation and necessary medical care.   Time spent examining patient, reviewing the images personally, reviewing the chart, perform high complexity decision making and speaking with the nursing staff regarding recommendations    Samuel Rivera MD,  Stroke, Neurocritical Care And/or [address] [place] [address] Stroke [address]  Electronically signed 11/4/2021 at 11:28 AM

## 2021-11-04 NOTE — ED NOTES
Bed: 02  Expected date:   Expected time:   Means of arrival:   Comments:  [address]icolette RN  11/04/21 9803

## 2021-11-04 NOTE — ED PROVIDER NOTES
[address]  eMERGENCY dEPARTMENT eNCOUnter    Pt Name: Remberto Bruno  MRN: [id number]  [place] 9/24/1939  Date of evaluation: 11/4/21  CHIEF COMPLAINT       Chief Complaint   Patient presents with    Extremity Weakness     LEFT     HISTORY OF PRESENT ILLNESS   HPI   Patient woke up with flaccid LUE paralysis this morning. LKE 10 pm last night when she went to bed. Mobile stroke brought her in, non-contrast head Ct with no acute findings. En route, she started to have a headache but also started to have some more movement in the arm. REVIEW OF SYSTEMS     Review of Systems   Constitutional: Negative for chills and fever. HENT: Negative for congestion, ear pain and sore throat. Eyes: Negative for pain, redness and visual disturbance. Respiratory: Negative for cough, chest tightness and shortness of breath. Cardiovascular: Negative for chest pain and palpitations. Gastrointestinal: Negative for abdominal pain, constipation, diarrhea, nausea and vomiting. Genitourinary: Negative for dysuria and vaginal discharge. Musculoskeletal: Negative for back pain and neck pain. Skin: Negative for rash and wound. Neurological: Positive for weakness (left arm) and headaches. Negative for seizures and syncope.      PASTMEDICAL HISTORY     Past Medical History:   Diagnosis Date    Amputation of leg ([address]     left aka    CAD (coronary artery disease)     CAD (coronary artery disease)     Chronic back pain     Coughing     Depression     Diabetes     Diphtheria     Full dentures     GERD (gastroesophageal reflux disease) 10/10/2013    Glaucoma     RKWZMSXI(982.5)     History of blood transfusion     Hx of blood clots     Hyperlipidemia     Hypertension     IDDM (insulin dependent diabetes mellitus)     Leg pain, bilateral     LONG TERM ANTICOAGULENT USE     Neuropathy     Numbness of toes     Obesity     Osteoarthritis     Peripheral vascular disease (HCC)     Rash, skin     lt arm  Type II or unspecified type diabetes mellitus without mention of complication, not stated as uncontrolled     Wears dentures     Wears glasses     Wears glasses      SURGICAL HISTORY       Past Surgical History:   Procedure Laterality Date    BYPASS GRAFT  2008    CATARACT REMOVAL  1980    bilateral    CORONARY ANGIOPLASTY      STENT X2    CORONARY ARTERY BYPASS GRAFT  2001    HYSTERECTOMY, TOTAL ABDOMINAL      LEG AMPUTATION THROUGH KNEE Left      CURRENT MEDICATIONS       Previous Medications    ACETAMINOPHEN (TYLENOL 8 HOUR) 650 MG CR TABLET    Take 1 tablet by mouth every 8 hours as needed for Pain. ALCOHOL SWABS (B-D SINGLE USE SWABS REGULAR) PADS    use as directed    AMLODIPINE (NORVASC) 10 MG TABLET    Take 1 tablet by mouth daily    ATORVASTATIN (LIPITOR) 40 MG TABLET    Take 1 tablet by mouth daily. B-D UF III MINI PEN NEEDLES 31G X 5 MM MISC    use daily    BLOOD GLUCOSE MONITORING SUPPL (BLOOD GLUCOSE METER) KIT    Test 2-3 times daily Dx: 250.00  Diabetes Mellitus 2 Insulin Dependent    BRIMONIDINE-TIMOLOL (COMBIGAN) 0.2-0.5 % OPHTHALMIC SOLUTION    Place 1 drop into both eyes every 12 hours. INSULIN LISPRO PROTAMINE & LISPRO (HUMALOG MIX) (75-25) 100 UNIT PER ML SUSP INJECTION VIAL    Inject 35 Units into the skin nightly    INSULIN LISPRO PROTAMINE & LISPRO (HUMALOG MIX) (75-25) 100 UNIT PER ML SUSP INJECTION VIAL    Inject 45 Units into the skin daily (with breakfast)    LATANOPROST (XALATAN) 0.005 % OPHTHALMIC SOLUTION    Place 1 drop into both eyes nightly. METOPROLOL SUCCINATE (TOPROL XL) 100 MG EXTENDED RELEASE TABLET    Take 150 mg by mouth daily    NITROGLYCERIN (NITROSTAT) 0.3 MG SL TABLET    Place 1 tablet under the tongue as needed. ONDANSETRON (ZOFRAN ODT) 4 MG DISINTEGRATING TABLET    Take 1 tablet by mouth every 8 hours as needed for Nausea or Vomiting    PREGABALIN (LYRICA) 75 MG CAPSULE    Take 75 mg by mouth daily.     TRUEPLUS LANCETS 33G MISC    use two to three times a day    TRUETRACK TEST STRIP    TEST two to three times a day     ALLERGIES     has No Known Allergies. FAMILY HISTORY     She indicated that the status of her mother is unknown. She indicated that the status of her sister is unknown. She indicated that the status of her brother is unknown. SOCIALHISTORY      reports that she has quit smoking. She has never used smokeless tobacco. She reports that she does not drink alcohol and does not use drugs. PHYSICAL EXAM     INITIAL VITALS: BP (!) 150/65   Pulse 70   Temp 98.6 °F (37 °C)   Resp 13   Wt 165 lb (74.8 kg)   LMP  (LMP Unknown)   SpO2 (!) 70%   BMI 27.46 kg/m²    Physical Exam  Vitals and nursing note reviewed. Constitutional:       Appearance: She is well-developed. Comments: Non-toxic appearing, pleasant. HENT:      Head: Normocephalic and atraumatic. Right Ear: External ear normal.      Left Ear: External ear normal.   Eyes:      General:         Left eye: No discharge. Conjunctiva/sclera: Conjunctivae normal.      Pupils: Pupils are equal, round, and reactive to light. Neck:      Vascular: No JVD. Trachea: No tracheal deviation. Cardiovascular:      Rate and Rhythm: Normal rate and regular rhythm. Heart sounds: Normal heart sounds. Pulmonary:      Effort: Pulmonary effort is normal. No respiratory distress. Breath sounds: Normal breath sounds. No stridor. Abdominal:      General: Bowel sounds are normal.      Palpations: Abdomen is soft. Tenderness: There is no abdominal tenderness. Musculoskeletal:         General: No tenderness or deformity. Normal range of motion. Cervical back: Normal range of motion and neck supple. Skin:     General: Skin is warm and dry. Neurological:      Comments: Patient is alert and oriented. Speech is fluent and comprehension intact. No cranial nerve deficit. No facial droop. PERRL and visual fields intact to confrontation. Strength full in RUE. Bilateral AKA noted; able to left stumps off the bed. Able to left LUE from bed, sig drift, does not hit the bed. Sensation intact to light touch in all four extremities, no extinction to bilateral stimuli noted. MEDICAL DECISION MAKING:       ED Course as of Nov 04 1834   Thu Nov 04, 2021   1000 Stroke alert called on arrival. Patient with left sided arm weakness which has been improving for mobile stroke transport team. Spoke with Dr Selam Martino on call for stroke alerts. He recommended CTA head and neck and loading with plavix, aspirin and lipitor if patient not already on those medications. BP goal SBP <200. Per med rec during recent admission, ASA was dc'ed due to need for potential cystoscopy for diagnosis of renal mass (patient has not had the procedure yet); has not been on plavix for approx one year. She is already on daily Lipitor. We will load with plavix and ASA. CTA pending. [MK]   6861 Results of CTA reviewed with Dr Selam Martino. No lesion which can be intervened on, he recommends admission to [facility] for MRI, asa and plavix for 90 days. Accepted by Dr Bruno Jimenez. [MK]      ED Course User Index  [MK] Avi Narvaez MD     Procedures    DIAGNOSTIC RESULTS   EKG: All EKG's are interpreted by the Emergency Department Physician who either signs or Co-signs this chart inthe absence of a cardiologist.      RADIOLOGY:All plain film, CT, MRI, and formal ultrasound images (except ED bedside ultrasound) are read by the radiologist, see reports below, unless otherwise noted in MDM or here. CTA HEAD NECK W CONTRAST   Final Result   Critical stenosis within the right carotid siphon with extensive   atherosclerotic calcification and intraluminal plaque/thrombus with near   occlusive disease within the right cavernous ICA and proximal right   supraclinoid ICA. The right carotid terminus is widely patent.       No evidence of high-grade stenosis, occlusion or aneurysm within the   remaining anterior intracranial circulation or throughout the posterior   intracranial circulation. No hemodynamically significant stenosis within the cervical ICAs per NASCET   criteria. Patent cervical vertebral arteries. 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 Infection   is not entirely excluded. Findings were discussed with MIKIE DOUGLAS at 12:10 pm on 11/4/2021. XR CHEST PORTABLE   Final Result   No acute cardiopulmonary process         MRI brain with and without contrast    (Results Pending)     LABS: All lab results were reviewed by myself, and all abnormals are listed below.   Labs Reviewed   CBC WITH AUTO DIFFERENTIAL - Abnormal; Notable for the following components:       Result Value    Seg Neutrophils 72 (*)     Lymphocytes 20 (*)     All other components within normal limits   COMPREHENSIVE METABOLIC PANEL W/ REFLEX TO MG FOR LOW K - Abnormal; Notable for the following components:    Glucose 182 (*)     CREATININE 1.09 (*)     Anion Gap 8 (*)     Alkaline Phosphatase 111 (*)     GFR Non- 48 (*)     GFR  59 (*)     All other components within normal limits   TROPONIN - Abnormal; Notable for the following components:    Troponin, High Sensitivity 16 (*)     All other components within normal limits   POC GLUCOSE FINGERSTICK - Abnormal; Notable for the following components:    POC Glucose 155 (*)     All other components within normal limits   POC GLUCOSE FINGERSTICK - Abnormal; Notable for the following components:    POC Glucose 145 (*)     All other components within normal limits   POCT GLUCOSE - Normal   PROTIME-INR   CBC     EMERGENCY DEPARTMENT COURSE:   Vitals:    Vitals:    11/04/21 1202 11/04/21 1316 11/04/21 1532 11/04/21 1729   BP: (!) 152/68 (!) 168/70 (!) 175/60 (!) 150/65   Pulse: 70 73 68 70   Resp: 12 15 13 13   Temp:       SpO2: 97% 98% 98% (!) 70%   Weight:           The patient was given the following medications while in the emergency department:  Orders Placed This Encounter   Medications    aspirin tablet 325 mg    clopidogrel (PLAVIX) tablet 300 mg    0.9 % sodium chloride bolus    sodium chloride flush 0.9 % injection 10 mL    iopamidol (ISOVUE-370) 76 % injection 75 mL    oxyCODONE-acetaminophen (PERCOCET) 5-325 MG per tablet 1 tablet    pregabalin (LYRICA) capsule 75 mg    enoxaparin (LOVENOX) injection 40 mg    clopidogrel (PLAVIX) tablet 75 mg    atorvastatin (LIPITOR) tablet 80 mg     CONSULTS:  IP CONSULT TO PHARMACY  PHARMACY TO CHANGE BASE FLUIDS  IP CONSULT TO INTERNAL MEDICINE  IP CONSULT TO NEUROLOGY    FINAL IMPRESSION      1. C[facility]brovascular accident ([facility]), [facility]          [address] Admitted 11/04/2021 02:31:41 PM      PATIENT REFERRED TO:  No follow-up provider specified.   DISCHARGE MEDICATIONS:  New Prescriptions    No medications on file     Claudette Ling, MD  AttendingEmergency Physician                        Bony Betancourt MD  11/04/21 8120

## 2021-11-04 NOTE — ED NOTES
Access center was called at [phone] regarding stroke alert. At [phone], [facility] is paging the neuro-interventionist at [facility].      [place] Keiko Howe  11/04/21 5915

## 2021-11-04 NOTE — ED NOTES
Telestroke communication with Dr. Manjinder tierney, assisted with assessment. Dr. Adriana Zamora in to room to discuss patient and plan.        Adonis Aguirre RN  11/04/21 8779

## 2021-11-04 NOTE — H&P
[address] [facility] [address] [address] [facility] [address]      [address] [facility]     HISTORY AND PHYSICAL EXAMINATION            Date:   11/4/2021  Patient name:  Kae Garcia  Date of admission:  11/4/2021  9:33 AM  MRN:   [id number]  Account:  [de-identified]  YOB: 1939  PCP:    No primary care provider on file. Room:   02/02  Code Status:    Prior    Chief Complaint:     Chief Complaint   Patient presents with    Extremity Weakness     LEFT       History Obtained From:     patient    History of Present Illness: The patient is a [de-identified] y.o. Non- / non  female who presents withExtremity Weakness (LEFT)   and she is admitted to the hospital for the management of acute ischemic stroke. Patient's last known well was yesterday evening before bed. Patient woke up this morning with symptoms of left arm weakness. She described as clumsiness which she kept dropping things. She described it as a funny feeling. Patient had her life alert and she was brought into the ED. Stroke alert was called, NIH SS was 1. CT scan of the head was negative, CTA showed ICAD. Telestroke physician decided against endovascular intervention and recommends medical management. 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 During interview patient states that her arm still feels numb but the strength is returning. She states that her mother suffered from TIAs. Of note, she has a left renal mass that she says she has an appointment this week to get biopsied.         Past Medical History:     Past Medical History:   Diagnosis Date    Amputation of leg ([address]     left aka    CAD (coronary artery disease)     CAD (coronary artery disease)     Chronic back pain     Coughing     Depression     Diabetes     Diphtheria     Full dentures     GERD (gastroesophageal reflux disease) 10/10/2013    Glaucoma     XSMEOXOK(812.3)     History of blood transfusion     Hx of blood clots     Hyperlipidemia     Hypertension     IDDM (insulin dependent diabetes mellitus)     Leg pain, bilateral     LONG TERM ANTICOAGULENT USE     Neuropathy     Numbness of toes     Obesity     Osteoarthritis     Peripheral vascular disease (HCC)     Rash, skin     lt arm    Type II or unspecified type diabetes mellitus without mention of complication, not stated as uncontrolled     Wears dentures     Wears glasses     Wears glasses         Past SurgicalHistory:     Past Surgical History:   Procedure Laterality Date    BYPASS GRAFT  2008    CATARACT REMOVAL  1980    bilateral    CORONARY ANGIOPLASTY      STENT X2    CORONARY ARTERY BYPASS GRAFT  2001    HYSTERECTOMY, TOTAL ABDOMINAL      LEG AMPUTATION THROUGH KNEE Left         Medications Prior to Admission:        Prior to Admission medications    Medication Sig Start Date End Date Taking? Authorizing Provider   ondansetron (ZOFRAN ODT) 4 MG disintegrating tablet Take 1 tablet by mouth every 8 hours as needed for Nausea or Vomiting 10/25/21  Yes John Monte MD   amLODIPine (NORVASC) 10 MG tablet Take 1 tablet by mouth daily 10/25/21  Yes John Monte MD   insulin lispro protamine & lispro (HUMALOG MIX) (75-25) 100 UNIT per ML SUSP injection vial Inject 35 Units into the skin nightly   Yes Historical Provider, MD   metoprolol succinate (TOPROL XL) 100 MG extended release tablet Take 150 mg by mouth daily   Yes Historical Provider, MD   pregabalin (LYRICA) 75 MG capsule Take 75 mg by mouth daily.    Yes Historical Provider, MD   insulin lispro protamine & lispro (HUMALOG MIX 75/25 KWIKPEN) (75-25) 100 UNIT per ML SUPN injection pen Inject 45 units under the skin in the morning and 35 units at night  Patient taking differently: Inject 45 Units into the skin daily (with breakfast) Inject 45 units under the skin in the morning and 35 units at night 10/24/20  Yes Bjorn Alvarado,    nitroGLYCERIN (NITROSTAT) 0.3 MG SL tablet Place 1 tablet under the tongue as needed. 9/12/14  Yes Naty Chaney MD   B-BRIA UF III MINI PEN NEEDLES 31G X 5 MM MISC use daily 6/18/15   Naty Chaney MD   Alcohol Swabs (B-D SINGLE USE SWABS REGULAR) PADS use as directed 5/1/15   Alberto Baca MD   TRUETRACK TEST strip TEST two to three times a day 3/26/15   Naty Chaney MD   TRUEPLUS LANCETS 33G MISC use two to three times a day 3/26/15   Naty Chaney MD   atorvastatin (LIPITOR) 40 MG tablet Take 1 tablet by mouth daily. 3/23/15   Naty Chaney MD   acetaminophen (TYLENOL 8 HOUR) 650 MG CR tablet Take 1 tablet by mouth every 8 hours as needed for Pain. 3/23/15   Naty Chaney MD   Blood Glucose Monitoring Suppl (BLOOD GLUCOSE METER) KIT Test 2-3 times daily Dx: 250.00  Diabetes Mellitus 2 Insulin Dependent 3/23/15   Naty Chaney MD   brimonidine-timolol (COMBIGAN) 0.2-0.5 % ophthalmic solution Place 1 drop into both eyes every 12 hours. Historical Provider, MD   latanoprost (XALATAN) 0.005 % ophthalmic solution Place 1 drop into both eyes nightly. Historical Provider, MD        Allergies:     Patient has no known allergies. Social History:     Tobacco:    reports that she has quit smoking. She has never used smokeless tobacco.  Alcohol:      reports no history of alcohol use. Drug Use:  reports no history of drug use. Family History:     Family History   Problem Relation Age of Onset    Cancer Mother         stomach    High Blood Pressure Mother     Heart Disease Sister     Heart Disease Brother        Review of Systems:     Positive and Negative as described in HPI.     Review of Systems   Constitutional: Negative for activity change, appetite change, chills, fatigue and fever. Respiratory: Negative for cough, chest tightness, shortness of breath and wheezing. Cardiovascular: Negative for chest pain and palpitations. Gastrointestinal: Positive for abdominal pain. Negative for abdominal distention, constipation, diarrhea, nausea and vomiting. Left-sided abdominal pain which she attributes to renal mass   Genitourinary: Negative for difficulty urinating, dysuria, flank pain, frequency and urgency. Musculoskeletal: Negative for back pain and joint swelling. Skin: Negative for rash and wound. Neurological: Positive for weakness and numbness (Left arm). Negative for dizziness, tremors, seizures, syncope, facial asymmetry, speech difficulty, light-headedness and headaches. Psychiatric/Behavioral: Negative for confusion, dysphoric mood and sleep disturbance. The patient is not nervous/anxious. Physical Exam:   BP (!) 168/70   Pulse 73   Temp 98.6 °F (37 °C)   Resp 15   Wt 165 lb (74.8 kg)   LMP  (LMP Unknown)   SpO2 98%   BMI 27.46 kg/m²   Temp (24hrs), Av.6 °F (37 °C), Min:98.6 °F (37 °C), Max:98.6 °F (37 °C)    Recent Labs     21  0946   POCGLU 155*     No intake or output data in the 24 hours ending 21 1505    Physical Exam  Constitutional:       General: She is not in acute distress. HENT:      Mouth/Throat:      Mouth: Mucous membranes are moist.      Pharynx: Oropharynx is clear. Eyes:      Extraocular Movements: Extraocular movements intact. Conjunctiva/sclera: Conjunctivae normal.      Pupils: Pupils are equal, round, and reactive to light. Cardiovascular:      Rate and Rhythm: Normal rate and regular rhythm. Pulses: Normal pulses. Heart sounds: Normal heart sounds. No murmur heard. Pulmonary:      Effort: Pulmonary effort is normal.      Breath sounds: Normal breath sounds. No wheezing or rales. Abdominal:      General: There is no distension. Palpations: Abdomen is soft. Tenderness: There is abdominal tenderness. There is no guarding. Musculoskeletal:         General: Normal range of motion. Cervical back: Normal range of motion and neck supple. Comments: Bilateral lower limb above-the-knee amputation   Skin:     General: Skin is warm and dry. Capillary Refill: Capillary refill takes less than 2 seconds. Coloration: Skin is not pale. Findings: No lesion. Comments: Hirsutism   Neurological:      Mental Status: She is alert and oriented to person, place, and time. Mental status is at baseline. Cranial Nerves: Cranial nerves are intact. No cranial nerve deficit, dysarthria or facial asymmetry. Sensory: No sensory deficit. Motor: Weakness (Strength 5/5 in right upper limb, 5-/5 in left upper limb) present. No tremor or atrophy. Coordination: Coordination is intact. Coordination normal.      Deep Tendon Reflexes: Reflexes normal.   Psychiatric:         Mood and Affect: Mood normal.         Behavior: Behavior normal.         Thought Content:  Thought content normal.         Judgment: Judgment normal.         Investigations:     Laboratory Testing:  Recent Results (from the past 24 hour(s))   POC Glucose Fingerstick    Collection Time: 11/04/21  9:46 AM   Result Value Ref Range    POC Glucose 155 (H) 65 - 105 mg/dL   EKG 12 Lead    Collection Time: 11/04/21  9:48 AM   Result Value Ref Range    Ventricular Rate 83 BPM    Atrial Rate 83 BPM    P-R Interval 190 ms    QRS Duration 78 ms    Q-T Interval 368 ms    QTc Calculation (Bazett) 432 ms    P Axis 49 degrees    R Axis -18 degrees    T Axis 80 degrees   POCT Glucose    Collection Time: 11/04/21  9:49 AM   Result Value Ref Range    Glucose 155 mg/dL   CBC Auto Differential    Collection Time: 11/04/21  9:58 AM   Result Value Ref Range    WBC 6.2 3.5 - 11.0 k/uL    RBC 4.44 4.0 - 5.2 m/uL    Hemoglobin 13.0 12.0 - 16.0 g/dL    Hematocrit 38.7 36 - 46 %    MCV 87.0 80 - 100 fL    MCH 29.2 26 - 34 pg    MCHC 33.5 31 - 37 g/dL    RDW 13.9 11.5 - 14.9 %    Platelets 528 807 - 620 k/uL    MPV 8.2 6.0 - 12.0 fL    NRBC Automated NOT REPORTED per 100 WBC    Differential Type NOT REPORTED     Seg Neutrophils 72 (H) 36 - 66 %    Lymphocytes 20 (L) 24 - 44 %    Monocytes 6 1 - 7 %    Eosinophils % 2 0 - 4 %    Basophils 0 0 - 2 %    Immature Granulocytes NOT REPORTED 0 %    Segs Absolute 4.40 1.3 - 9.1 k/uL    Absolute Lymph # 1.30 1.0 - 4.8 k/uL    Absolute Mono # 0.40 0.1 - 1.3 k/uL    Absolute Eos # 0.10 0.0 - 0.4 k/uL    Basophils Absolute 0.00 0.0 - 0.2 k/uL    Absolute Immature Granulocyte NOT REPORTED 0.00 - 0.30 k/uL    WBC Morphology NOT REPORTED     RBC Morphology NOT REPORTED     Platelet Estimate NOT REPORTED    Comprehensive Metabolic Panel w/ Reflex to MG    Collection Time: 11/04/21  9:58 AM   Result Value Ref Range    Glucose 182 (H) 70 - 99 mg/dL    BUN 21 8 - 23 mg/dL    CREATININE 1.09 (H) 0.50 - 0.90 mg/dL    Bun/Cre Ratio NOT REPORTED 9 - 20    Calcium 10.0 8.6 - 10.4 mg/dL    Sodium 142 135 - 144 mmol/L    Potassium 4.3 3.7 - 5.3 mmol/L    Chloride 107 98 - 107 mmol/L    CO2 27 20 - 31 mmol/L    Anion Gap 8 (L) 9 - 17 mmol/L    Alkaline Phosphatase 111 (H) 35 - 104 U/L    ALT 12 5 - 33 U/L    AST 14 <32 U/L    Total Bilirubin 0.32 0.3 - 1.2 mg/dL    Total Protein 7.2 6.4 - 8.3 g/dL    Albumin 3.8 3.5 - 5.2 g/dL    Albumin/Globulin Ratio NOT REPORTED 1.0 - 2.5    GFR Non- 48 (L) >60 mL/min    GFR  59 (L) >60 mL/min    GFR Comment          GFR Staging NOT REPORTED    Troponin    Collection Time: 11/04/21  9:58 AM   Result Value Ref Range    Troponin, High Sensitivity 16 (H) 0 - 14 ng/L    Troponin T NOT REPORTED <0.03 ng/mL    Troponin Interp NOT REPORTED    Protime-INR    Collection Time: 11/04/21  9:58 AM   Result Value Ref Range    Protime 13.8 11.8 - 14.6 sec    INR 1.1        Imaging/Diagnostics:  CT ABDOMEN PELVIS WO CONTRAST Additional Contrast? None    Result Date: 10/21/2021  EXAMINATION: CT OF THE ABDOMEN AND PELVIS WITHOUT CONTRAST 10/21/2021 3:42 pm TECHNIQUE: CT of the abdomen and pelvis was performed without the administration of intravenous contrast. Multiplanar reformatted images are provided for review. Dose modulation, iterative reconstruction, and/or weight based adjustment of the mA/kV was utilized to reduce the radiation dose to as low as reasonably achievable. COMPARISON: None. HISTORY: ORDERING SYSTEM PROVIDED HISTORY: flank pain TECHNOLOGIST PROVIDED HISTORY: flank pain Decision Support Exception - unselect if not a suspected or confirmed emergency medical condition->Emergency Medical Condition (MA) Reason for Exam: left flank pain Acuity: Acute Type of Exam: Initial FINDINGS: Lower Chest: The visualized heart and lungs show no acute abnormalities. Organs: Limited evaluation the absence of IV contrast.  Liver, spleen, pancreas, adrenal glands show no significant abnormalities. There is other gallbladder wall calcification or a few tiny gallstones. No nephrolithiasis. Renal vascular calcifications. The left kidney appears somewhat amorphous with loss of the hilar fat. Absence of IV contrast limits evaluation. This may be further assessed with MRI. GI/Bowel: There is limited evaluation due to absence of oral contrast.  No bowel obstruction. Normal appendix. Pelvis: Urinary bladder shows no calculi. Hysterectomy noted. Peritoneum/Retroperitoneum: No ureteric calculus. Amorphous left para-aortic soft tissue density with some stranding not optimally assessed in the absence of IV contrast.  Possibilities include scarring, conglomerate of adenopathy and or periaortic hematoma. IVC filter in place. Bones/Soft Tissues: No acute abnormality of the bones. The superficial soft tissues show no significant abnormalities. 1. Somewhat amorphous appearance of the left kidney with loss of hilar fat representing a change from prior.   Lack of IV contrast limits evaluation. Contrast enhanced CT or MRI may be helpful for further evaluation. 2. Left periaortic amorphous soft tissue extending along the length of the infrarenal aorta about 2.0 x 2.2 cm in maximal AP by transverse dimension. This has developed since 2012. Not well assessed in the absence of IV contrast.Possibilities include scarring, conglomerate of adenopathy and or periaortic hematoma. Further evaluation contrast-enhanced CT may be considered. CT HEAD WO CONTRAST    Result Date: 11/4/2021  EXAMINATION: CT OF THE HEAD WITHOUT CONTRAST  11/4/2021 9:04 am TECHNIQUE: CT of the head was performed without the administration of intravenous contrast. Dose modulation, iterative reconstruction, and/or weight based adjustment of the mA/kV was utilized to reduce the radiation dose to as low as reasonably achievable. COMPARISON: None. HISTORY: ORDERING SYSTEM PROVIDED HISTORY: stroke TECHNOLOGIST PROVIDED HISTORY: stroke Decision Support Exception - unselect if not a suspected or confirmed emergency medical condition->Emergency Medical Condition (MA) FINDINGS: BRAIN/VENTRICLES:  No evidence of an acute infarct or other acute parenchymal process. No evidence of acute intracranial hemorrhage. There is no evidence of an intracranial mass or extraaxial fluid collection. No significant mass effect or midline shift. There is mild generalized volume loss. Ventricular enlargement concordant with the degree of parenchymal volume loss. Scattered patchy foci of low attenuation are present within supratentorial white matter which is a nonspecific finding but likely represents mild microvascular ischemia. Atherosclerotic calcification present throughout the carotid siphons and intracranial vertebral arteries. ORBITS: The visualized portion of the orbits demonstrate no acute abnormality. Mild proptosis. SINUSES:  The visualized paranasal sinuses and mastoid air cells demonstrate no acute abnormality.  SOFT TISSUES/SKULL: No acute abnormality of the visualized skull or soft tissues. 1. No acute cortical infarct or other acute intracranial process. The findings were sent to the Radiology Results [address] at 9:40 am on 11/4/2021to be communicated to a licensed caregiver. CT ABDOMEN PELVIS W IV CONTRAST Additional Contrast? None    Addendum Date: 10/21/2021    ADDENDUM: Differential considerations would include primary urothelial carcinoma, involving the left renal pelvis and ureter, with metastatic adenopathy to the retroperitoneum. Result Date: 10/21/2021  EXAMINATION: CT OF THE ABDOMEN AND PELVIS WITH CONTRAST 10/21/2021 10:55 am TECHNIQUE: CT of the abdomen and pelvis was performed with the administration of intravenous contrast. Multiplanar reformatted images are provided for review. Dose modulation, iterative reconstruction, and/or weight based adjustment of the mA/kV was utilized to reduce the radiation dose to as low as reasonably achievable. COMPARISON: CT scan dated October 21, 2021 and 25 October 2012 HISTORY: ORDERING SYSTEM PROVIDED HISTORY: pain TECHNOLOGIST PROVIDED HISTORY: pain Decision Support Exception - unselect if not a suspected or confirmed emergency medical condition->Emergency Medical Condition (MA) Reason for Exam: pain Acuity: Unknown Type of Exam: Unknown Additional signs and symptoms: PT STATES LEFT SIDED PAIN FINDINGS: Lower Chest: Dependent changes are present within the lung bases. Extensive coronary arterial calcifications are present. Organs: The liver, spleen, pancreas, and gallbladder demonstrate no acute abnormality. Cholelithiasis is present without evidence of cholecystitis. Cortical cyst formation is present on the left kidney. Abnormal soft tissue attenuation is present within the left renal pelvis, surrounding the pelvocaliceal system, extending along the course of the left ureter. .  No hydronephrosis is present. GI/Bowel: Small hiatal hernia is present. Small bowel appears normal without evidence of obstruction. The appendix is normal.  Scattered colonic diverticula are present without evidence of diverticulitis. Pelvis: Urinary bladder is incompletely distended. The uterus is surgically absent. Peritoneum/Retroperitoneum: Inferior vena cava filter is in place. Many of the legs of the filter project beyond the lumen of the inferior vena cava. The inferior vena cava caudal to the filter is small in caliber, with heterogeneous attenuation, suggesting chronic thrombosis. Atherosclerotic changes are present throughout the course of the abdominal aorta. Abnormal soft tissue attenuation is present within the left para-aortic region with loss of the normal fat plane adjacent to the abdominal aorta. Soft tissue measures approximately 2.3 x 2 cm in greatest AP and transverse dimension. The soft tissue attenuation extends caudally within the retroperitoneum, along the course of the left ureter, into the pelvis. Bones/Soft Tissues: No acute osseous abnormality is present. 1. Abnormal soft tissue attenuation encompassing the left pelvocaliceal system, within the left renal pelvis, extending caudally along the course of the ureter, and most prominent within the left para-aortic region. Differential considerations would include lymphoma, retroperitoneal fibrosis, or potentially metastatic adenopathy within the periaortic region. Correlation with urine cytology recommended. If this is negative, CT-guided percutaneous biopsy of the percutaneous soft tissue mass should be considered. XR CHEST PORTABLE    Result Date: 11/4/2021  EXAMINATION: ONE XRAY VIEW OF THE CHEST 11/4/2021 10:03 am COMPARISON: October 24, 2020 HISTORY: ORDERING SYSTEM PROVIDED HISTORY: stroke symptoms TECHNOLOGIST PROVIDED HISTORY: stroke symptoms Reason for Exam: stroke symptoms Acuity: Acute Type of Exam: Initial FINDINGS: Stable cardiomediastinal silhouette.   No pulmonary venous congestion or edema. No convincing lung consolidation or infiltrate. No pleural effusion or pneumothorax. Osseous structures grossly intact. No acute cardiopulmonary process     CTA HEAD NECK W CONTRAST    Result Date: 11/4/2021  EXAMINATION: CTA OF THE HEAD AND NECK WITH CONTRAST 11/4/2021 11:06 am: TECHNIQUE: CTA of the head and neck was performed with the administration of intravenous contrast. Multiplanar reformatted images are provided for review. MIP images are provided for review. Stenosis of the internal carotid arteries measured using NASCET criteria. Dose modulation, iterative reconstruction, and/or weight based adjustment of the mA/kV was utilized to reduce the radiation dose to as low as reasonably achievable. COMPARISON: Concurrent head CT HISTORY: ORDERING SYSTEM PROVIDED HISTORY: Left arm weakness FINDINGS: CTA NECK: AORTIC ARCH/ARCH VESSELS: No dissection or arterial injury. No significant stenosis of the brachiocephalic or subclavian arteries. CAROTID ARTERIES: No dissection, arterial injury, or hemodynamically significant stenosis by NASCET criteria. VERTEBRAL ARTERIES: No dissection, arterial injury, or significant stenosis. SOFT TISSUES: The lung apices are clear. No cervical or superior mediastinal lymphadenopathy. The larynx and pharynx are unremarkable. No acute abnormality of the salivary and thyroid glands. Incidental small left pleural base lipoma along the anterior chest wall. BONES: Multilevel cervical spondylosis with disc osteophyte complex resulting in moderate to severe canal stenosis at C6-7. There is endplate irregularity, subchondral sclerosis and erosive changes at the C6-7 disc space likely reflecting advanced degenerative changes. CTA HEAD: ANTERIOR CIRCULATION: There is extensive atherosclerotic calcification involving the carotid siphons with internal mural thrombus/plaque within the right cavernous ICA and proximal right supraclinoid ICA resulting in near occlusive disease. The right carotid terminus is patent. Moderate multifocal stenosis throughout the left intracranial ICA. The proximal MCAs and ACAs are patent without high-grade stenosis or occlusion. No intracranial aneurysm. POSTERIOR CIRCULATION: No significant stenosis of the vertebral, basilar, or posterior cerebral arteries. No aneurysm. OTHER: No dural venous sinus thrombosis on this non-dedicated study. BRAIN: No mass effect or midline shift. No extra-axial fluid collection. The gray-white differentiation is maintained. Critical stenosis within the right carotid siphon with extensive atherosclerotic calcification and intraluminal plaque/thrombus with near occlusive disease within the right cavernous ICA and proximal right supraclinoid ICA. The right carotid terminus is widely patent. No evidence of high-grade stenosis, occlusion or aneurysm within the remaining anterior intracranial circulation or throughout the posterior intracranial circulation. No hemodynamically significant stenosis within the cervical ICAs per NASCET criteria. Patent cervical vertebral arteries. 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 Infection is not entirely excluded. Findings were discussed with MIKIE DOUGLAS at 12:10 pm on 11/4/2021. Assessment :      Primary Problem  Acute ischemic stroke    Active Hospital Problems    Diagnosis Date Noted    Acute ischemic stroke [facility]) [I63.9] 11/04/2021       Plan:     Patient status Admit as inpatient in the  Progressive Unit/Step down    Acute ischemic stroke  Neurology consult  CT showed no acute abnormality  CTA showed ICAD  Telestroke did not recommend endovascular intervention, medical management  Patient loaded with aspirin 325 mg and Plavix 300 mg. Lifelong aspirin 81 mg daily, Plavix 75 mg for 90 days  Lipitor 80 mg daily with goal LDL less than 70  As needed labetalol to keep BP below 200/100 for the first day, all home antihypertensive held.   Gradually lower blood pressure over a week  MRA in the a.m. PT/OT consult  Swallow assessment    Type 2 diabetes mellitus, insulin-dependent  Medium dose insulin sliding scale with hypoglycemic protocol       Consultations:   IP CONSULT TO PHARMACY  PHARMACY TO CHANGE BASE FLUIDS  IP CONSULT TO INTERNAL MEDICINE  IP CONSULT TO NEUROLOGY    Patient is admitted as inpatient status because of co-morbiditieslisted above, severity of signs and symptoms as outlined, requirement for current medical therapies and most importantly because of direct risk to patient if care not provided in a hospital setting. Mychal Dela Cruz MD  11/4/2021  3:05 PM    Copy sent to Dr. Landrum Chacon primary care provider on file. I have discussed the care of [facility] , including pertinent history and exam findings,    today with the resident. I have seen and examined the patient and the key elements of all parts of the encounter have been performed by me . I agree with the assessment, plan and orders as documented by the resident. Principal Problem:    Acute ischemic stroke [facility])  Active Problems:    Diabetes mellitus, type 2  Resolved Problems:    * No resolved hospital problems. *        Overall  course ;                                   are improving over time.         Patient is doing much better today  MRI brain is negative for stroke, likely had TIA  History of diabetes, hypertension, bilateral amputation with peripheral artery disease, coronary artery disease s/p CABG  Started on dual antiplatelet will continue with dual antiplatelet for 90 days  We discussed with neurology team if no further plan, likely discharge home            Electronically signed by Kelvin Ferrer MD

## 2021-11-04 NOTE — ED NOTES
Returned call to daughter, Lawanda Hodgkin, updated her on patient's condition. Phone number is [phone].      Adonis Aguirre RN  11/04/21 0929

## 2021-11-04 NOTE — ED TRIAGE NOTES
..    11/4/2021 9:57 AM    Patient: Aurea Palomino, [de-identified] y.o., female Race: -American  Patient Address: [address] [facility] [place] [address] [place] [address]  Incident Address:  [x]Same as patient address     [x] [facility]  [] [facility]  [] [facility] ORTHOPEDIC AND SPINE [facility] [place]   [] RONAK ENCARNACION JR. [facility]  [] [facility]  Patient Phone #: [phone]   Insurance: Payor: Jean Pierre Boyer /  /  /   Gerard Yu:  []  Yes   [x]  No  Emergency Contact: Extended Emergency Contact Information  Primary Emergency Contact: [address] [place] [address] Phone: [phone]  Mobile Phone: [phone]  Relation: Child  Secondary Emergency Contact: [address] [place] [address] Phone: [phone]  Relation: Child  MRN: [id number]  [facility]# [id number]  YOB: 1939  Primary Care Physician: No primary care provider on file. Advance Directive: [x] Full Code   []DNR-CC   []DNR-CCA    MSU Crew:  Estevan Sher - CT Tech, T Pili - Paramedic,  Nika Hernandez - BARBARA    Pt Transported To:  [] [place] [address]  [] [facility]  [x] [facility]  [] Kin[facility] Casas   [] [facility]  [] [facility]  [facility] [] [facility] [] [facility]    Was patient transported to closest facility? [x]Yes  []No (if No specify reason)   []   Patient/family request    []   Divert to specialist       Response Code   [] 2  [x] 3  []   Change  [] 2  [] 3   Transport Code   [x] 2  [] 3  []   Change  [] 2  [] 3     Mileage:  [address] [facility] [address]   Total Shyanne Kurtis 1.7     Call Received 11/4/2021 0841   Dispatched 11/4/2021 0844   Enroute 11/4/2021 0848   Arrived Scene 11/4/2021 0852   At Patient 11/4/2021 0854   Decision to Scan 11/4/2021 0855   Scan 11/4/2021 0907   Departed Scene 11/4/2021 [address] 11/4/2021 0925   Departed Hospital 11/4/2021 0945   In Service 11/4/2021 0945         Allergies  [] Updated/Reviewed by MSU  [x] Historical Data Only  [] Unavailable  has No Known Allergies.   Medications  [] Updated/Reviewed by MSU  [x] Historical Data Only  [] Unavailable  Prior to Admission medications    Medication Sig Start Date End Date Taking? Authorizing Provider   ondansetron (ZOFRAN ODT) 4 MG disintegrating tablet Take 1 tablet by mouth every 8 hours as needed for Nausea or Vomiting 10/25/21   Gris Ch MD   amLODIPine (NORVASC) 10 MG tablet Take 1 tablet by mouth daily 10/25/21   Gris Ch MD   insulin lispro protamine & lispro (HUMALOG MIX) (75-25) 100 UNIT per ML SUSP injection vial Inject 35 Units into the skin nightly    Historical Provider, MD   metoprolol succinate (TOPROL XL) 100 MG extended release tablet Take 150 mg by mouth daily    Historical Provider, MD   pregabalin (LYRICA) 75 MG capsule Take 75 mg by mouth daily. Historical Provider, MD   insulin lispro protamine & lispro (HUMALOG MIX 75/25 KWIKPEN) (75-25) 100 UNIT per ML SUPN injection pen Inject 45 units under the skin in the morning and 35 units at night  Patient taking differently: Inject 45 Units into the skin daily (with breakfast) Inject 45 units under the skin in the morning and 35 units at night 10/24/20   Bjorn Alvarado,    B-D UF III MINI PEN NEEDLES 31G X 5 MM MISC use daily 6/18/15   Naty Hartman MD   Alcohol Swabs (B-D SINGLE USE SWABS REGULAR) PADS use as directed 5/1/15   Gaudencio De MD   TRUETRACK TEST strip TEST two to three times a day 3/26/15   Naty Hartman MD   TRUEPLUS LANCETS 33G MISC use two to three times a day 3/26/15   Naty Hartman MD   atorvastatin (LIPITOR) 40 MG tablet Take 1 tablet by mouth daily. 3/23/15   Naty Hartman MD   acetaminophen (TYLENOL 8 HOUR) 650 MG CR tablet Take 1 tablet by mouth every 8 hours as needed for Pain. 3/23/15   Naty Hartman MD   Blood Glucose Monitoring Suppl (BLOOD GLUCOSE METER) KIT Test 2-3 times daily Dx: 250.00  Diabetes Mellitus 2 Insulin Dependent 3/23/15   Naty Hartman MD   nitroGLYCERIN (NITROSTAT) 0.3 MG SL tablet Place 1 tablet under the tongue as needed.  9/12/14   Mbonu Chuck Devan, MD   brimonidine-timolol (COMBIGAN) 0.2-0.5 % ophthalmic solution Place 1 drop into both eyes every 12 hours. Historical Provider, MD   latanoprost (XALATAN) 0.005 % ophthalmic solution Place 1 drop into both eyes nightly. Historical Provider, MD      Past Medical History  [] Updated/Reviewed by MSU  [x] Historical Data Only  [] Unavailable   has a past medical history of Amputation of leg ([facility] [address] CAD (coronary artery disease), CAD (coronary artery disease), Chronic back pain, Coughing, Depression, Diabetes, Diphtheria, Full dentures, GERD (gastroesophageal reflux disease), Glaucoma, Headache(784.0), History of blood transfusion, Hx of blood clots, Hyperlipidemia, Hypertension, IDDM (insulin dependent diabetes mellitus), Leg pain, bilateral, LONG TERM ANTICOAGULENT USE, Neuropathy, Numbness of toes, Obesity, Osteoarthritis, Peripheral vascular disease ([facility] Rash, skin, Type II or unspecified type diabetes mellitus without mention of complication, not stated as uncontrolled, Wears dentures, Wears glasses, and Wears glasses. Patient Weight: 150 lbs   [x]   Estimated   []   Stated          VITALS          Time BP Pulse   Resp  Rate N-normal  S-shallow  D-deep Monitor SpO2 O2    LPM BGL   Temp Pain   0908 177/76 94 10 N NSR 97  NA NA   0910 174/81 94 10 N NSR 97 RA      0915 207/74 97 20 N ST 97 RA      0921 190/71 94 12 N NSR 95 RA      0925 197/68 97 15 N ST 97 RA         Pupils GCS   Time R L E  4 V  5 M  6 T  15   0855 blind 2 4 5 6 15                                           NIH -   record on all transports and Q15 min after tPA administration    NIHSS       Time 0855 0910     1a. LOC - Arousal Status 0      1b. LOC - Questions 0      1c. LOC - Commands 0      2. Eye Movements (gaze) 0      3. Visual Fields 0      4. Facial Palsy 0      5a. Motor Left Arm 2 1     5b. Motor Right Arm 0      6a. Motor Left stump 0      6b. Motor Right stump 0      7. Limb Ataxia 0      8. Sensory 0      9. Language/Aphasia 0      10. Dysarthria 0      11.  Neglect 0      TOTAL 2 1         Research:    RACE Score: 2 Time: 6702   RACE Score: 1 Time: 0910  StrokeVAN Score: negTime:0855      Time Last Known Well: 2200 11/03/2021    Narrative:    Dispatched to possible CVA per [facility]. Arrived to find [place], [de-identified] y.o., female c/o unable to move left arm. Report received from [facility] 8 EMS at patients side. Nicole Vela available for consult. EMS states the pt went to bed last night feeling fine and woke up this morning and was unable to move her left arm. Upon our arrival pt is A/O x 3 her only deficit was her left arm was flaccid. CT scan completed and pt transported to closest facility. Following CT scan pt now having some gross motor movement of her left arm. Pt able to grasp with left hand. Unable to establish IV access at this time. Pt states she is a difficult IV start. Report called to [facility] ER along with ETA. Patient received the following medications prior to MSU arrival NA  Patient has the following lines prior to MSU arrival: NA  Patient has the following airway placed prior to MSU arrival: NA    Patient was transferred to cot via 2 person assist and secured with straps x3. Zoll ECG, SpO2, and NIBP applied and monitored throughout encounter. HOB maintained at 30 degrees. Patient was transferred to ambulance, cot secured in scan position. Non contrast CT scan performed. After scan cot secured in transport position. IV tPA inclusion/exclusion criteria reviewed with patient and physician. Candidate for IV tPA therapy? [] Yes   [x] No - due to the following exclusion criteria:    [] ICH   [] Taking anticoagulant -   [x] Beyond last time known well window  [] At or returned to baseline   [] Marked improvement of symptoms  [] No disabling symptoms  [] Other -     Patient is being transported to [facility].   During transport patient rests comfortably. Cabin temp maintained at 70-72 °F throughout transport.      Patient was transferred to bed ER # 2 via 4 person sheet lift. . Patient care handoff completed with Priyanka JIMENEZ at bedside. Imaging:  Images were obtained onboard the MSU including:  [x] CT brain without contrast - see results below:      CT HEAD WO CONTRAST    Result Date: 11/4/2021  EXAMINATION: CT OF THE HEAD WITHOUT CONTRAST  11/4/2021 9:04 am TECHNIQUE: CT of the head was performed without the administration of intravenous contrast. Dose modulation, iterative reconstruction, and/or weight based adjustment of the mA/kV was utilized to reduce the radiation dose to as low as reasonably achievable. COMPARISON: None. HISTORY: ORDERING SYSTEM PROVIDED HISTORY: stroke TECHNOLOGIST PROVIDED HISTORY: stroke Decision Support Exception - unselect if not a suspected or confirmed emergency medical condition->Emergency Medical Condition (MA) FINDINGS: BRAIN/VENTRICLES:  No evidence of an acute infarct or other acute parenchymal process. No evidence of acute intracranial hemorrhage. There is no evidence of an intracranial mass or extraaxial fluid collection. No significant mass effect or midline shift. There is mild generalized volume loss. Ventricular enlargement concordant with the degree of parenchymal volume loss. Scattered patchy foci of low attenuation are present within supratentorial white matter which is a nonspecific finding but likely represents mild microvascular ischemia. Atherosclerotic calcification present throughout the carotid siphons and intracranial vertebral arteries. ORBITS: The visualized portion of the orbits demonstrate no acute abnormality. Mild proptosis. SINUSES:  The visualized paranasal sinuses and mastoid air cells demonstrate no acute abnormality. SOFT TISSUES/SKULL: No acute abnormality of the visualized skull or soft tissues. 1. No acute cortical infarct or other acute intracranial process.  The findings were sent to the Radiology Results [address] at 9:40 am on 11/4/2021to be communicated to a licensed caregiver. Physical assessment performed:    Neuro: Pt is A/O x3 Pt is blind in right eye. Pt has no facial droop. Pt has a weaker hand grasp on the left. Pt left arm is flaccid. Pt has no deficits on the right. Pt is a bilat above the knee amputee but is able to lift up both stumps. Pt has no sensory deficits  Resp: lungs clear to auscultation bilaterally, normal effort  Cardiac: regular rate, no murmur, 12 lead ECG shows NSR  Muscular/skeletal/peripheral vascular: no edema, pt has bilat above the knee amputation. Abd/GI/: abd obtunded, soft, non tender, bowel sounds present x 4 quadrants   Skin: normal color, warm, dry      IV LINES   Time Size Site # Attempts Performed By   NA                         Total Amount of IV Fluids Infused: NA    MEDS / ELECTRICAL RX   Time Therapy Dosage Route Response Performed By   NA                                                    TPA Administration    Time Bolus Dose Infusion Dose Waste   NA                [] tPA dosing double checked by:           ACUTE STROKE DYSPHAGIA SCREEN              YES NO   1 GCS less than 13?         2 Facial Asymmetry or Weakness? 3 Tongue Asymmetry or Weakness? 4 Palatal Asymmetry or Weakness? 5 Signs of aspiration during 3oz water test?*                 If answers to questions 1-4 are NO proceed to 3oz water test               *Administer 3oz of water for sequential drinks, note any throat clearing, cough, or change in vocal quality immediately after and 1 minute following the swallow. If answers to questions 1-5 are NO proceed with ordered PO meds       POC LABS      TIME NA     Test (reference range)      FSBG ()      PT (11-13.5)      INR (0.8-1.1)      Na (138-146)      K (3.5-4.9)      Cl ()      iCa (1.2-1.32)      TCO2 (24-29)      Glu ()      BUN (8.0-26)      Crea (0.6-1. 3)      Hct (38-51)      Hb (12.0-17)      AnGap 10.0-20)                Assistance:   [x]    Fire - E 15 M 13    []    Police    [x]    Other EMS (See Below)    LS# [address] Notification:    Med [facility] -  [] Dany Naranjo [address]  [] [facility]  [] [facility]  [] [facility]  [] [facility] [] [facility] [] [facility] [] [facility]  [] [employer]     [x]    Med Channel:     []    Cell Phone     Med Control Orders Received:   [x] No  []  Yes:     Med Control Physician (if on line medical direction received)  -        Hospital Team Alert:   []    Trauma Alert    []    STEMI Alert         [x]    Stroke Alert    []    RACE Alert      Description Of Valuables: Pt purse given to son    Patient Valuables:   []    With Patient    [x]    Not Received    []    ER / Lab     Call Outcome:   [x]    Transport to Facility    []    Care Transferred to [facility]    []    Cancelled    []    Patient Refusal    []                           Mobile Stroke Unit    Electronically signed by Vinicius Call RN on 11/4/21 at 9:57 AM EDT

## 2021-11-04 NOTE — ED NOTES
Patient continues to report improved use of left arm and hand. Able to hold a cup. Bilateral hand grasps strong and equal.  Smile symmetrical.  Speech is clear. Headache pain gone, just some dizziness reported by patient.        Baltazar Gonzalez RN  11/04/21 3908

## 2021-11-05 VITALS
TEMPERATURE: 97.7 F | RESPIRATION RATE: 16 BRPM | OXYGEN SATURATION: 98 % | DIASTOLIC BLOOD PRESSURE: 71 MMHG | WEIGHT: 165 LBS | BODY MASS INDEX: 27.46 KG/M2 | HEART RATE: 73 BPM | SYSTOLIC BLOOD PRESSURE: 162 MMHG

## 2021-11-05 LAB
CHOLESTEROL/HDL RATIO: 4.7
CHOLESTEROL: 177 MG/DL
EKG ATRIAL RATE: 83 BPM
EKG P AXIS: 49 DEGREES
EKG P-R INTERVAL: 190 MS
EKG Q-T INTERVAL: 368 MS
EKG QRS DURATION: 78 MS
EKG QTC CALCULATION (BAZETT): 432 MS
EKG R AXIS: -18 DEGREES
EKG T AXIS: 80 DEGREES
EKG VENTRICULAR RATE: 83 BPM
GLUCOSE BLD-MCNC: 297 MG/DL (ref 65–105)
GLUCOSE BLD-MCNC: 304 MG/DL (ref 65–105)
GLUCOSE BLD-MCNC: 329 MG/DL (ref 65–105)
HCT VFR BLD CALC: 35.6 % (ref 36–46)
HDLC SERPL-MCNC: 38 MG/DL
HEMOGLOBIN: 12.3 G/DL (ref 12–16)
LDL CHOLESTEROL: 93 MG/DL (ref 0–130)
MCH RBC QN AUTO: 30 PG (ref 26–34)
MCHC RBC AUTO-ENTMCNC: 34.5 G/DL (ref 31–37)
MCV RBC AUTO: 87.2 FL (ref 80–100)
NRBC AUTOMATED: ABNORMAL PER 100 WBC
PDW BLD-RTO: 13.7 % (ref 11.5–14.9)
PLATELET # BLD: 188 K/UL (ref 150–450)
PMV BLD AUTO: 8 FL (ref 6–12)
RBC # BLD: 4.08 M/UL (ref 4–5.2)
TRIGL SERPL-MCNC: 228 MG/DL
VLDLC SERPL CALC-MCNC: ABNORMAL MG/DL (ref 1–30)
WBC # BLD: 3.8 K/UL (ref 3.5–11)

## 2021-11-05 PROCEDURE — 92523 SPEECH SOUND LANG COMPREHEN: CPT

## 2021-11-05 PROCEDURE — 6360000002 HC RX W HCPCS

## 2021-11-05 PROCEDURE — 97162 PT EVAL MOD COMPLEX 30 MIN: CPT

## 2021-11-05 PROCEDURE — 6370000000 HC RX 637 (ALT 250 FOR IP)

## 2021-11-05 PROCEDURE — 6370000000 HC RX 637 (ALT 250 FOR IP): Performed by: NURSE PRACTITIONER

## 2021-11-05 PROCEDURE — 36415 COLL VENOUS BLD VENIPUNCTURE: CPT

## 2021-11-05 PROCEDURE — 97530 THERAPEUTIC ACTIVITIES: CPT

## 2021-11-05 PROCEDURE — 80061 LIPID PANEL: CPT

## 2021-11-05 PROCEDURE — 93010 ELECTROCARDIOGRAM REPORT: CPT | Performed by: INTERNAL MEDICINE

## 2021-11-05 PROCEDURE — 82947 ASSAY GLUCOSE BLOOD QUANT: CPT

## 2021-11-05 PROCEDURE — 83036 HEMOGLOBIN GLYCOSYLATED A1C: CPT

## 2021-11-05 PROCEDURE — 99221 1ST HOSP IP/OBS SF/LOW 40: CPT | Performed by: PSYCHIATRY & NEUROLOGY

## 2021-11-05 PROCEDURE — 85027 COMPLETE CBC AUTOMATED: CPT

## 2021-11-05 RX ORDER — ATORVASTATIN CALCIUM 80 MG/1
80 TABLET, FILM COATED ORAL NIGHTLY
Qty: 30 TABLET | Refills: 3 | Status: SHIPPED | OUTPATIENT
Start: 2021-11-05

## 2021-11-05 RX ORDER — AMLODIPINE BESYLATE 10 MG/1
10 TABLET ORAL DAILY
Status: DISCONTINUED | OUTPATIENT
Start: 2021-11-05 | End: 2021-11-05 | Stop reason: HOSPADM

## 2021-11-05 RX ORDER — METOPROLOL SUCCINATE 50 MG/1
150 TABLET, EXTENDED RELEASE ORAL DAILY
Qty: 30 TABLET | Refills: 3 | Status: SHIPPED | OUTPATIENT
Start: 2021-11-07

## 2021-11-05 RX ORDER — ONDANSETRON 2 MG/ML
4 INJECTION INTRAMUSCULAR; INTRAVENOUS EVERY 6 HOURS PRN
Status: DISCONTINUED | OUTPATIENT
Start: 2021-11-05 | End: 2021-11-05 | Stop reason: HOSPADM

## 2021-11-05 RX ORDER — ONDANSETRON 4 MG/1
4 TABLET, ORALLY DISINTEGRATING ORAL EVERY 8 HOURS PRN
Status: DISCONTINUED | OUTPATIENT
Start: 2021-11-05 | End: 2021-11-05 | Stop reason: HOSPADM

## 2021-11-05 RX ORDER — AMLODIPINE BESYLATE 10 MG/1
10 TABLET ORAL DAILY
Qty: 30 TABLET | Refills: 3 | Status: SHIPPED | OUTPATIENT
Start: 2021-11-05

## 2021-11-05 RX ORDER — ASPIRIN 81 MG/1
81 TABLET ORAL DAILY
Status: DISCONTINUED | OUTPATIENT
Start: 2021-11-05 | End: 2021-11-05 | Stop reason: HOSPADM

## 2021-11-05 RX ORDER — HYDROCODONE BITARTRATE AND ACETAMINOPHEN 5; 325 MG/1; MG/1
1 TABLET ORAL EVERY 6 HOURS PRN
Status: DISCONTINUED | OUTPATIENT
Start: 2021-11-05 | End: 2021-11-05

## 2021-11-05 RX ORDER — INSULIN GLARGINE 100 [IU]/ML
25 INJECTION, SOLUTION SUBCUTANEOUS NIGHTLY
Status: DISCONTINUED | OUTPATIENT
Start: 2021-11-05 | End: 2021-11-05 | Stop reason: HOSPADM

## 2021-11-05 RX ORDER — ASPIRIN 81 MG/1
81 TABLET ORAL DAILY
Qty: 30 TABLET | Refills: 3 | Status: SHIPPED | OUTPATIENT
Start: 2021-11-06

## 2021-11-05 RX ORDER — CLOPIDOGREL BISULFATE 75 MG/1
75 TABLET ORAL DAILY
Qty: 89 TABLET | Refills: 0 | Status: SHIPPED | OUTPATIENT
Start: 2021-11-06 | End: 2022-09-21

## 2021-11-05 RX ORDER — DEXTROSE MONOHYDRATE 25 G/50ML
12.5 INJECTION, SOLUTION INTRAVENOUS PRN
Status: DISCONTINUED | OUTPATIENT
Start: 2021-11-05 | End: 2021-11-05 | Stop reason: HOSPADM

## 2021-11-05 RX ORDER — HYDROCODONE BITARTRATE AND ACETAMINOPHEN 5; 325 MG/1; MG/1
2 TABLET ORAL EVERY 6 HOURS PRN
Status: DISCONTINUED | OUTPATIENT
Start: 2021-11-05 | End: 2021-11-05 | Stop reason: HOSPADM

## 2021-11-05 RX ORDER — POLYETHYLENE GLYCOL 3350 17 G/17G
17 POWDER, FOR SOLUTION ORAL DAILY PRN
Status: DISCONTINUED | OUTPATIENT
Start: 2021-11-05 | End: 2021-11-05 | Stop reason: HOSPADM

## 2021-11-05 RX ORDER — NICOTINE POLACRILEX 4 MG
15 LOZENGE BUCCAL PRN
Status: DISCONTINUED | OUTPATIENT
Start: 2021-11-05 | End: 2021-11-05 | Stop reason: HOSPADM

## 2021-11-05 RX ORDER — LABETALOL HYDROCHLORIDE 5 MG/ML
10 INJECTION, SOLUTION INTRAVENOUS EVERY 10 MIN PRN
Status: DISCONTINUED | OUTPATIENT
Start: 2021-11-05 | End: 2021-11-05 | Stop reason: HOSPADM

## 2021-11-05 RX ORDER — DEXTROSE MONOHYDRATE 50 MG/ML
100 INJECTION, SOLUTION INTRAVENOUS PRN
Status: DISCONTINUED | OUTPATIENT
Start: 2021-11-05 | End: 2021-11-05 | Stop reason: HOSPADM

## 2021-11-05 ASSESSMENT — PAIN DESCRIPTION - FREQUENCY: FREQUENCY: CONTINUOUS

## 2021-11-05 ASSESSMENT — PAIN DESCRIPTION - DESCRIPTORS: DESCRIPTORS: DISCOMFORT

## 2021-11-05 ASSESSMENT — PAIN SCALES - GENERAL
PAINLEVEL_OUTOF10: 8
PAINLEVEL_OUTOF10: 10
PAINLEVEL_OUTOF10: 8
PAINLEVEL_OUTOF10: 10

## 2021-11-05 ASSESSMENT — PAIN DESCRIPTION - PROGRESSION: CLINICAL_PROGRESSION: NOT CHANGED

## 2021-11-05 ASSESSMENT — ENCOUNTER SYMPTOMS
DIARRHEA: 0
WHEEZING: 0
CONSTIPATION: 0
VOMITING: 0
COUGH: 0
SHORTNESS OF BREATH: 0
ABDOMINAL DISTENTION: 0
CHEST TIGHTNESS: 0
NAUSEA: 0
ABDOMINAL PAIN: 1
BACK PAIN: 0

## 2021-11-05 ASSESSMENT — PAIN DESCRIPTION - ONSET: ONSET: ON-GOING

## 2021-11-05 ASSESSMENT — PAIN DESCRIPTION - PAIN TYPE: TYPE: CHRONIC PAIN

## 2021-11-05 ASSESSMENT — PAIN DESCRIPTION - ORIENTATION: ORIENTATION: LEFT

## 2021-11-05 ASSESSMENT — PAIN - FUNCTIONAL ASSESSMENT: PAIN_FUNCTIONAL_ASSESSMENT: PREVENTS OR INTERFERES SOME ACTIVE ACTIVITIES AND ADLS

## 2021-11-05 ASSESSMENT — PAIN DESCRIPTION - LOCATION: LOCATION: RIB CAGE;BACK;ABDOMEN

## 2021-11-05 NOTE — PROGRESS NOTES
Physical Therapy    Facility/Department: [facility] PROGRESSIVE CARE  Initial Assessment    NAME: Brittany Cha  : 1939  MRN: [id number]    Date of Service: 2021    Discharge Recommendations:  Patient would benefit from continued therapy after discharge   PT Equipment Recommendations  Equipment Needed: No    Assessment   Body structures, Functions, Activity limitations: Decreased functional mobility ; Decreased ADL status; Decreased strength;Decreased balance;Decreased posture;Decreased endurance  Assessment: Pt would benefit from continued therapy and increased aide services due to her age and comorbidities. Treatment Diagnosis: Impaired functional mobility 2* weakness  Specific instructions for Next Treatment: transfers (slideboard), core strengthening, therex  Prognosis: Good  Decision Making: Medium Complexity  History: Patient woke up with flaccid LUE paralysis this morning. LKE 10 pm last night when she went to bed. Mobile stroke brought her in, non-contrast head Ct with no acute findings. En route, she started to have a headache but also started to have some more movement in the arm  Exam: ROM, MMT, bed mobility, transfers, balance  Clinical Presentation: Pt alert, cooperative  Barriers to Learning: none  REQUIRES PT FOLLOW UP: Yes  Activity Tolerance  Activity Tolerance: Patient Tolerated treatment well       Patient Diagnosis(es): The encounter diagnosis was Cerebrovascular accident (CVA), unspecified mechanism ([facility] [address]      has a past medical history of Amputation of leg ([facility] [address] CAD (coronary artery disease), CAD (coronary artery disease), Chronic back pain, Coughing, Depression, Diabetes, Diphtheria, Full dentures, GERD (gastroesophageal reflux disease), Glaucoma, Headache(784.0), History of blood transfusion, Hx of blood clots, Hyperlipidemia, Hypertension, IDDM (insulin dependent diabetes mellitus), Leg pain, bilateral, LONG TERM ANTICOAGULENT USE, Neuropathy, Numbness of toes, Obesity, Osteoarthritis, Orientation  Orientation  Overall Orientation Status: Within Functional Limits  Social/Functional History  Social/Functional History  Lives With: Alone  Type of Home: Apartment  Home Layout: One level  Home Access: Level entry  Bathroom Shower/Tub: Walk-in shower  Bathroom Toilet: Handicap height  Bathroom Equipment: Grab bars in shower, Tub transfer bench, Hand-held shower  Bathroom Accessibility: Wheelchair accessible  Home Equipment: Wheelchair-electric, [address]  Receives Help From: Home health, Family (home health set up 2x/week)  ADL Assistance: Independent (uses transfer bench for bathing)  Homemaking Assistance: Independent  Homemaking Responsibilities: Yes (granddaughter orders groceries online)  Ambulation Assistance:  (NON AMBULATORY)  Transfer Assistance: Independent (uses slideboard to power chair)  Active : No  Patient's  Info: Public/medical transportation services  Occupation: Retired  Type of occupation: housekeeping  IADL Comments: sleeps in hospital bed  Additional Comments: Had home health recently set up for assist with shower for safety. Scheduled 2x/week. 2 sons live nearby - both work, one is  and the other is self employed. Cognition        Objective          AROM RLE (degrees)  RLE AROM: WFL  RLE General AROM: at hip  AROM LLE (degrees)  LLE AROM : WFL  LLE General AROM: at hip  AROM RUE (degrees)  RUE AROM : WFL  AROM LUE (degrees)  LUE AROM : WFL  Strength RLE  Strength RLE: WFL  Comment: hip 4-/5  Strength LLE  Strength LLE: WFL  Comment: hip 4-/5  Strength RUE  Strength RUE: WFL  Comment: grossly 4-/5  Strength LUE  Strength LUE: WFL  Comment: grossly 4-/5     Sensation  Overall Sensation Status: Impaired (numbness in L hand)  Bed mobility  Rolling to Left: Supervision  Supine to Sit: Supervision  Sit to Supine: Supervision  Scooting: Supervision  Comment: Flat bed, good mobility in bed. SBA for safety.  Pt back in bed end of session  Transfers  Sit to Stand: Unable to assess  Stand to sit: Unable to assess  Bed to Chair: Stand by assistance  Lateral Transfers: Stand by assistance  Comment: Pt scoots from bed<-->BSC with upper body. SBA for safety. Writer making sure BSC is stationary. Pt reports family will bring in her wheelchair prior to d/c. Pt does not have prosthetics. Ambulation  Ambulation?: No (pt does not ambulate)  Stairs/Curb  Stairs?: No     Balance  Posture: Fair  Sitting - Static: Good  Sitting - Dynamic: Good  Standing - Static:  (STACEY)  Standing - Dynamic:  (STACEY)  Comments: Fall risk, pivots to [address]  Times per week: 3-4x/week  Specific instructions for Next Treatment: transfers (slideboard), core strengthening, therex  Current Treatment Recommendations: Strengthening, ROM, Balance Training, Functional Mobility Training, Transfer Training, Endurance Training, Home Exercise Program, Safety Education & Training, Patient/Caregiver Education & Training, Equipment Evaluation, Education, & procurement, Positioning  Safety Devices  Type of devices: All fall risk precautions in place, Call light within reach, Gait belt, Patient at risk for falls, Nurse notified, Left in bed (BARBARA Yepez)  Restraints  Initially in place: No    G-Code       OutComes Score                                                  AM-PAC Score     AM-PAC Inpatient Mobility without Stair Climbing Raw Score : 11 (11/05/21 1455)  AM-PAC Inpatient without Stair Climbing T-Scale Score : 35.66 (11/05/21 1455)  Mobility Inpatient CMS 0-100% Score: 67.36 (11/05/21 1455)  Mobility Inpatient without Stair CMS G-Code Modifier : CL (11/05/21 1455)       Goals  Short term goals  Time Frame for Short term goals: 2-3 visits  Short term goal 1: Pt to demo IND bed mobility. Short term goal 2: Pt to demo IND transfers to w/c and back with slideboard. Short term goal 3: Pt to improve B UE/LE strength by 1/2 MMG.   Short term goal 4: Pt to demo good technique

## 2021-11-05 NOTE — CARE COORDINATION
CASE MANAGEMENT NOTE:    Admission Date:  11/4/2021 Aurea Palomnio is a [de-identified] y.o.  female    Admitted for : Acute ischemic stroke [facility]) [I63.9]  Cerebrovascular accident (CVA), unspecified mechanism ([facility] [address] [I63.9]    Met with:  Patient    PCP:  Dr. Shaw Age:  Reta Powersepifanio Medicare      Is patient alert and oriented at time of discussion:  Yes    Current Residence/ Living Arrangements:  independently at home             Current Services PTA:  No    Does patient go to outpatient dialysis: No  If yes, location and chair time:     Is patient agreeable to VNS: yes    Freedom of choice provided:  Yes    List of [address] provided: Yes    VNS chosen:  Yes    DME:  wheelchair and scooter    Home Oxygen: No    Nebulizer: No    CPAP/BIPAP: No    Supplier: N/A    Potential Assistance Needed: No    SNF needed: No    Freedom of choice and list provided: NA    Pharmacy:  [facility]       Does Patient want to use MEDS to BEDS? No    Is patient currently receiving oral anticoagulation therapy? No    Is the Patient an RUSTY CERDA [facility] with Readmission Risk Score greater than 14%? No  If yes, pt needs a follow up appointment made within 7 days. Family Members/Caregivers that pt would like involved in their care:    Yes    If yes, list name here:  [employer]    Transportation Provider:  Family             Discharge Plan:  11/5/21 [facility] Medicare Pt is from home the [employer] apts she uses a DME  power chair and denies Pt states that she has VNS she will get the name for me will follow for needs . //tv                  Electronically signed by:  Reji Oconnor RN on 11/5/2021 at 12:50 PM

## 2021-11-05 NOTE — PROGRESS NOTES
No  Subjective  Subjective: Pt denies any change in speech/language/cognition and swallowing. Agreeable to evaluation     Vision  Vision: Within Functional Limits  Hearing  Hearing: Within functional limits           Objective: Auditory Comprehension  Comprehension: Within Functional Limits         Expression  Primary Mode of Expression: Verbal              Motor Speech  Motor Speech: Within Functional Limits    Pragmatics/Social Functioning  Pragmatics: Within functional limits    Cognition:      Orientation  Overall Orientation Status: Within Normal Limits  Attention  Attention: Within Functional Limits  Memory  Memory: Within Funtional Limits  Problem Solving  Problem Solving: Within Functional Limits    Additional Assessments:   Bedside swallowing evaluation completed this date. OM exam revealed edentulous oral cavity, however, pt states that she \"always eats with her dentures out\". Patient presents with probable safe swallow for Dental soft (easy to chew) diet with thin liquids as evidenced by no overt s/s of aspiration noted with consistencies tested. Recommend small sips and bites, only feed when alert and awake and upright at 90 degrees for all PO intake. Recommend close monitoring for overt/clinical s/s of aspiration and D/C PO intake and complete Modified Barium Swallow Study should they occur. Results and recommendations reported to RN.           Prognosis:  Individuals consulted  Consulted and agree with results and recommendations: Patient;RN    Education:  Patient Education: yes  Patient Education Response: Verbalizes understanding          Therapy Time:   Individual Concurrent Group Co-treatment   Time In 1101         Time Out 1117         Minutes [address], [place] Mik GARCIA  11/5/2021 11:19 AM

## 2021-11-05 NOTE — DISCHARGE INSTR - DIET

## 2021-11-05 NOTE — PLAN OF CARE
Problem: Skin Integrity:  Goal: Will show no infection signs and symptoms  Description: Will show no infection signs and symptoms  Outcome: Completed  Goal: Absence of new skin breakdown  Description: Absence of new skin breakdown  Outcome: Completed     Problem: SAFETY  Goal: Free from accidental physical injury  Outcome: Completed     Problem: DAILY CARE  Goal: Daily care needs are met  Outcome: Completed     Problem: PAIN  Goal: Patient's pain/discomfort is manageable  Outcome: Completed     Problem: SKIN INTEGRITY  Goal: Skin integrity is maintained or improved  Outcome: Completed     Problem: KNOWLEDGE DEFICIT  Goal: Patient/S.O. demonstrates understanding of disease process, treatment plan, medications, and discharge instructions. Outcome: Completed     Problem: DISCHARGE BARRIERS  Goal: Patient's continuum of care needs are met  Outcome: Completed     Problem: Musculor/Skeletal Functional Status  Goal: Highest potential functional level  Outcome: Completed  Goal: Absence of falls  Outcome: Completed     Problem: Pain:  Description: Pain management should include both nonpharmacologic and pharmacologic interventions.   Goal: Pain level will decrease  Description: Pain level will decrease  Outcome: Completed  Goal: Control of acute pain  Description: Control of acute pain  Outcome: Completed  Goal: Control of chronic pain  Description: Control of chronic pain  Outcome: Completed

## 2021-11-05 NOTE — DISCHARGE SUMMARY
outpatient. Of note patient has a left adrenal mass noted on CT scan which she is waiting for an appointment for a biopsy. Significant therapeutic interventions: CT head, CTA, MRI brain return stroke alert. Patient started on aspirin 81 mg, Plavix 75 mg for 90 days, Lipitor 80 mg. Significant Diagnostic Studies:   Labs / Micro:  CBC:   Lab Results   Component Value Date    WBC 3.8 11/05/2021    RBC 4.08 11/05/2021    RBC 4.51 10/04/2011    HGB 12.3 11/05/2021    HCT 35.6 11/05/2021    MCV 87.2 11/05/2021    MCH 30.0 11/05/2021    MCHC 34.5 11/05/2021    RDW 13.7 11/05/2021     11/05/2021     10/04/2011     BMP:    Lab Results   Component Value Date    GLUCOSE 182 11/04/2021    GLUCOSE 248 10/04/2011     11/04/2021    K 4.3 11/04/2021     11/04/2021    CO2 27 11/04/2021    ANIONGAP 8 11/04/2021    BUN 21 11/04/2021    CREATININE 1.09 11/04/2021    BUNCRER NOT REPORTED 11/04/2021    CALCIUM 10.0 11/04/2021    LABGLOM 48 11/04/2021    GFRAA 59 11/04/2021    GFR      11/04/2021    GFR NOT REPORTED 11/04/2021       Radiology:    CT ABDOMEN PELVIS WO CONTRAST Additional Contrast? None    Result Date: 10/21/2021  EXAMINATION: CT OF THE ABDOMEN AND PELVIS WITHOUT CONTRAST 10/21/2021 3:42 pm TECHNIQUE: CT of the abdomen and pelvis was performed without the administration of intravenous contrast. Multiplanar reformatted images are provided for review. Dose modulation, iterative reconstruction, and/or weight based adjustment of the mA/kV was utilized to reduce the radiation dose to as low as reasonably achievable. COMPARISON: None.  HISTORY: ORDERING SYSTEM PROVIDED HISTORY: flank pain TECHNOLOGIST PROVIDED HISTORY: flank pain Decision Support Exception - unselect if not a suspected or confirmed emergency medical condition->Emergency Medical Condition (MA) Reason for Exam: left flank pain Acuity: Acute Type of Exam: Initial FINDINGS: Lower Chest: The visualized heart and lungs show no acute abnormalities. Organs: Limited evaluation the absence of IV contrast.  Liver, spleen, pancreas, adrenal glands show no significant abnormalities. There is other gallbladder wall calcification or a few tiny gallstones. No nephrolithiasis. Renal vascular calcifications. The left kidney appears somewhat amorphous with loss of the hilar fat. Absence of IV contrast limits evaluation. This may be further assessed with MRI. GI/Bowel: There is limited evaluation due to absence of oral contrast.  No bowel obstruction. Normal appendix. Pelvis: Urinary bladder shows no calculi. Hysterectomy noted. Peritoneum/Retroperitoneum: No ureteric calculus. Amorphous left para-aortic soft tissue density with some stranding not optimally assessed in the absence of IV contrast.  Possibilities include scarring, conglomerate of adenopathy and or periaortic hematoma. IVC filter in place. Bones/Soft Tissues: No acute abnormality of the bones. The superficial soft tissues show no significant abnormalities. 1. Somewhat amorphous appearance of the left kidney with loss of hilar fat representing a change from prior. Lack of IV contrast limits evaluation. Contrast enhanced CT or MRI may be helpful for further evaluation. 2. Left periaortic amorphous soft tissue extending along the length of the infrarenal aorta about 2.0 x 2.2 cm in maximal AP by transverse dimension. This has developed since 2012. Not well assessed in the absence of IV contrast.Possibilities include scarring, conglomerate of adenopathy and or periaortic hematoma. Further evaluation contrast-enhanced CT may be considered.      CT HEAD WO CONTRAST    Result Date: 11/4/2021  EXAMINATION: CT OF THE HEAD WITHOUT CONTRAST  11/4/2021 9:04 am TECHNIQUE: CT of the head was performed without the administration of intravenous contrast. Dose modulation, iterative reconstruction, and/or weight based adjustment of the mA/kV was utilized to reduce the radiation dose to as low as reasonably achievable. COMPARISON: None. HISTORY: ORDERING SYSTEM PROVIDED HISTORY: stroke TECHNOLOGIST PROVIDED HISTORY: stroke Decision Support Exception - unselect if not a suspected or confirmed emergency medical condition->Emergency Medical Condition (MA) FINDINGS: BRAIN/VENTRICLES:  No evidence of an acute infarct or other acute parenchymal process. No evidence of acute intracranial hemorrhage. There is no evidence of an intracranial mass or extraaxial fluid collection. No significant mass effect or midline shift. There is mild generalized volume loss. Ventricular enlargement concordant with the degree of parenchymal volume loss. Scattered patchy foci of low attenuation are present within supratentorial white matter which is a nonspecific finding but likely represents mild microvascular ischemia. Atherosclerotic calcification present throughout the carotid siphons and intracranial vertebral arteries. ORBITS: The visualized portion of the orbits demonstrate no acute abnormality. Mild proptosis. SINUSES:  The visualized paranasal sinuses and mastoid air cells demonstrate no acute abnormality. SOFT TISSUES/SKULL: No acute abnormality of the visualized skull or soft tissues. 1. No acute cortical infarct or other acute intracranial process. The findings were sent to the Radiology Results [address] at 9:40 am on 11/4/2021to be communicated to a licensed caregiver. CT ABDOMEN PELVIS W IV CONTRAST Additional Contrast? None    Addendum Date: 10/21/2021    ADDENDUM: Differential considerations would include primary urothelial carcinoma, involving the left renal pelvis and ureter, with metastatic adenopathy to the retroperitoneum. Result Date: 10/21/2021  EXAMINATION: CT OF THE ABDOMEN AND PELVIS WITH CONTRAST 10/21/2021 10:55 am TECHNIQUE: CT of the abdomen and pelvis was performed with the administration of intravenous contrast. Multiplanar reformatted images are provided for review.  Dose modulation, iterative reconstruction, and/or weight based adjustment of the mA/kV was utilized to reduce the radiation dose to as low as reasonably achievable. COMPARISON: CT scan dated October 21, 2021 and 25 October 2012 HISTORY: ORDERING SYSTEM PROVIDED HISTORY: pain TECHNOLOGIST PROVIDED HISTORY: pain Decision Support Exception - unselect if not a suspected or confirmed emergency medical condition->Emergency Medical Condition (MA) Reason for Exam: pain Acuity: Unknown Type of Exam: Unknown Additional signs and symptoms: PT STATES LEFT SIDED PAIN FINDINGS: Lower Chest: Dependent changes are present within the lung bases. Extensive coronary arterial calcifications are present. Organs: The liver, spleen, pancreas, and gallbladder demonstrate no acute abnormality. Cholelithiasis is present without evidence of cholecystitis. Cortical cyst formation is present on the left kidney. Abnormal soft tissue attenuation is present within the left renal pelvis, surrounding the pelvocaliceal system, extending along the course of the left ureter. .  No hydronephrosis is present. GI/Bowel: Small hiatal hernia is present. Small bowel appears normal without evidence of obstruction. The appendix is normal.  Scattered colonic diverticula are present without evidence of diverticulitis. Pelvis: Urinary bladder is incompletely distended. The uterus is surgically absent. Peritoneum/Retroperitoneum: Inferior vena cava filter is in place. Many of the legs of the filter project beyond the lumen of the inferior vena cava. The inferior vena cava caudal to the filter is small in caliber, with heterogeneous attenuation, suggesting chronic thrombosis. Atherosclerotic changes are present throughout the course of the abdominal aorta. Abnormal soft tissue attenuation is present within the left para-aortic region with loss of the normal fat plane adjacent to the abdominal aorta.   Soft tissue measures approximately 2.3 x 2 cm in greatest AP and transverse dimension. The soft tissue attenuation extends caudally within the retroperitoneum, along the course of the left ureter, into the pelvis. Bones/Soft Tissues: No acute osseous abnormality is present. 1. Abnormal soft tissue attenuation encompassing the left pelvocaliceal system, within the left renal pelvis, extending caudally along the course of the ureter, and most prominent within the left para-aortic region. Differential considerations would include lymphoma, retroperitoneal fibrosis, or potentially metastatic adenopathy within the periaortic region. Correlation with urine cytology recommended. If this is negative, CT-guided percutaneous biopsy of the percutaneous soft tissue mass should be considered. XR CHEST PORTABLE    Result Date: 11/4/2021  EXAMINATION: ONE XRAY VIEW OF THE CHEST 11/4/2021 10:03 am COMPARISON: October 24, 2020 HISTORY: ORDERING SYSTEM PROVIDED HISTORY: stroke symptoms TECHNOLOGIST PROVIDED HISTORY: stroke symptoms Reason for Exam: stroke symptoms Acuity: Acute Type of Exam: Initial FINDINGS: Stable cardiomediastinal silhouette. No pulmonary venous congestion or edema. No convincing lung consolidation or infiltrate. No pleural effusion or pneumothorax. Osseous structures grossly intact. No acute cardiopulmonary process     CTA HEAD NECK W CONTRAST    Result Date: 11/4/2021  EXAMINATION: CTA OF THE HEAD AND NECK WITH CONTRAST 11/4/2021 11:06 am: TECHNIQUE: CTA of the head and neck was performed with the administration of intravenous contrast. Multiplanar reformatted images are provided for review. MIP images are provided for review. Stenosis of the internal carotid arteries measured using NASCET criteria. Dose modulation, iterative reconstruction, and/or weight based adjustment of the mA/kV was utilized to reduce the radiation dose to as low as reasonably achievable.  COMPARISON: Concurrent head CT HISTORY: ORDERING SYSTEM PROVIDED HISTORY: Left arm weakness FINDINGS: CTA NECK: AORTIC ARCH/ARCH VESSELS: No dissection or arterial injury. No significant stenosis of the brachiocephalic or subclavian arteries. CAROTID ARTERIES: No dissection, arterial injury, or hemodynamically significant stenosis by NASCET criteria. VERTEBRAL ARTERIES: No dissection, arterial injury, or significant stenosis. SOFT TISSUES: The lung apices are clear. No cervical or superior mediastinal lymphadenopathy. The larynx and pharynx are unremarkable. No acute abnormality of the salivary and thyroid glands. Incidental small left pleural base lipoma along the anterior chest wall. BONES: Multilevel cervical spondylosis with disc osteophyte complex resulting in moderate to severe canal stenosis at C6-7. There is endplate irregularity, subchondral sclerosis and erosive changes at the C6-7 disc space likely reflecting advanced degenerative changes. CTA HEAD: ANTERIOR CIRCULATION: There is extensive atherosclerotic calcification involving the carotid siphons with internal mural thrombus/plaque within the right cavernous ICA and proximal right supraclinoid ICA resulting in near occlusive disease. The right carotid terminus is patent. Moderate multifocal stenosis throughout the left intracranial ICA. The proximal MCAs and ACAs are patent without high-grade stenosis or occlusion. No intracranial aneurysm. POSTERIOR CIRCULATION: No significant stenosis of the vertebral, basilar, or posterior cerebral arteries. No aneurysm. OTHER: No dural venous sinus thrombosis on this non-dedicated study. BRAIN: No mass effect or midline shift. No extra-axial fluid collection. The gray-white differentiation is maintained. Critical stenosis within the right carotid siphon with extensive atherosclerotic calcification and intraluminal plaque/thrombus with near occlusive disease within the right cavernous ICA and proximal right supraclinoid ICA. The right carotid terminus is widely patent.  No evidence of high-grade stenosis, occlusion or aneurysm within the remaining anterior intracranial circulation or throughout the posterior intracranial circulation. No hemodynamically significant stenosis within the cervical ICAs per NASCET criteria. Patent cervical vertebral arteries. 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 Infection is not entirely excluded. Findings were discussed with MIKIE DOUGLAS at 12:10 pm on 11/4/2021. MRI BRAIN WO CONTRAST    Result Date: 11/4/2021  EXAMINATION: MRI OF THE BRAIN WITHOUT CONTRAST  11/4/2021 7:23 pm TECHNIQUE: Multiplanar multisequence MRI of the brain was performed without the administration of intravenous contrast. COMPARISON: None. HISTORY: Reason for Exam: pt woke up with left arm numbness/weakness, stroke alert called earlier FINDINGS: INTRACRANIAL STRUCTURES/VENTRICLES: There is no acute infarct. Mild periventricular and deep white matter T2/FLAIR hyperintensity, most consistent with chronic small vessel ischemic disease. No mass effect or midline shift. No evidence of an acute intracranial hemorrhage. The ventricles and sulci are prominent secondary to mild atrophy. The sellar/suprasellar regions appear unremarkable. The normal signal voids within the major intracranial vessels appear maintained. ORBITS: The visualized portion of the orbits demonstrate no acute abnormality. SINUSES: The visualized paranasal sinuses and mastoid air cells are well aerated. BONES/SOFT TISSUES: The bone marrow signal intensity appears normal. The soft tissues demonstrate no acute abnormality. No acute intracranial abnormality. Mild chronic small vessel ischemic disease and mild atrophy.          Consultations:    Consults:     Final Specialist Recommendations/Findings:   IP CONSULT TO PHARMACY  PHARMACY TO CHANGE BASE FLUIDS  IP CONSULT TO INTERNAL MEDICINE  IP CONSULT TO NEUROLOGY  IP CONSULT TO DIETITIAN      The patient was seen and examined on day of discharge and this discharge summary is in conjunction with any daily progress note from day of discharge. Discharge plan:       Disposition: Home    Physician Follow Up:     Carlota Cardenas MD  [address]  [phone]    Schedule an appointment as soon as possible for a visit         Requiring Further Evaluation/Follow Up POST HOSPITALIZATION/Incidental Findings: Follow-up with neurology for further assessment. left renal mass on CT abdomen    Diet: diabetic diet    Activity: As tolerated    Instructions to Patient: If new or worsening symptoms please return to the ED    Discharge Medications:      Medication List      START taking these medications    aspirin 81 MG EC tablet  Take 1 tablet by mouth daily  Start taking on: November 6, 2021     clopidogrel 75 MG tablet  Commonly known as: PLAVIX  Take 1 tablet by mouth daily for 89 doses  Start taking on: November 6, 2021        CHANGE how you take these medications    atorvastatin 80 MG tablet  Commonly known as: LIPITOR  Take 1 tablet by mouth nightly  What changed:   · medication strength  · how much to take  · when to take this     metoprolol succinate 50 MG extended release tablet  Commonly known as: TOPROL XL  Take 3 tablets by mouth daily  Start taking on: November 7, 2021  What changed:   · medication strength  · These instructions start on November 7, 2021. If you are unsure what to do until then, ask your doctor or other care provider. CONTINUE taking these medications    acetaminophen 650 MG extended release tablet  Commonly known as: Tylenol 8 Hour  Take 1 tablet by mouth every 8 hours as needed for Pain.      amLODIPine 10 MG tablet  Commonly known as: NORVASC  Take 1 tablet by mouth daily     B-D SINGLE USE SWABS REGULAR Pads  use as directed     B-D UF III MINI PEN NEEDLES 31G X 5 MM Misc  Generic drug: Insulin Pen Needle  use daily     Blood Glucose Meter Kit  Test 2-3 times daily Dx: 250.00  Diabetes Mellitus 2 Insulin Dependent     Combigan 0.2-0.5 % ophthalmic solution  Generic drug: brimonidine-timolol     * insulin lispro protamine & lispro (75-25) 100 UNIT per ML Susp injection vial  Commonly known as: HUMALOG MIX     * insulin lispro protamine & lispro (75-25) 100 UNIT per ML Susp injection vial  Commonly known as: HUMALOG MIX     latanoprost 0.005 % ophthalmic solution  Commonly known as: XALATAN     nitroGLYCERIN 0.3 MG SL tablet  Commonly known as: NITROSTAT  Place 1 tablet under the tongue as needed. ondansetron 4 MG disintegrating tablet  Commonly known as: Zofran ODT  Take 1 tablet by mouth every 8 hours as needed for Nausea or Vomiting     pregabalin 75 MG capsule  Commonly known as: LYRICA     TRUEplus Lancets 33G Misc  use two to three times a day     TrueTrack Test strip  Generic drug: blood glucose test strips  TEST two to three times a day         * This list has 2 medication(s) that are the same as other medications prescribed for you. Read the directions carefully, and ask your doctor or other care provider to review them with you. Where to Get Your Medications      These medications were sent to [facility], [facility] [address] [facility] [address]  [place] [id number]    Phone: [phone]   · amLODIPine 10 MG tablet  · aspirin 81 MG EC tablet  · atorvastatin 80 MG tablet  · clopidogrel 75 MG tablet  · metoprolol succinate 50 MG extended release tablet         Time Spent on discharge is  40 mins in patient examination, evaluation, counseling as well as medication reconciliation, prescriptions for required medications, discharge plan and follow up. Electronically signed by   Kurtis Goltz, MD  11/5/2021  4:41 PM      Thank you Dr. Mio Balderas primary care provider on file. for the opportunity to be involved in this patient's care.     Attending Physician Statement  I have discussed the care of [facility] and I have examined the

## 2021-11-05 NOTE — CONSULTS
[facility] Neurology   IN-PATIENT SERVICE      NEUROLOGY CONSULT  NOTE            Date:   11/5/2021  Patient name:  Miranda Duque  Date of admission:  11/4/2021  YOB: 1939      Chief Complaint:     Chief Complaint   Patient presents with    Extremity Weakness     LEFT       Reason for Consult:      Transient left arm weakness    History of Present Illness: The patient is a [de-identified] y.o. female who presents with Extremity Weakness (LEFT)  . The patient was seen and examined and the chart was reviewed. Patient presented to the emergency room yesterday with left arm weakness. Telestroke service was called. Dr. Jennifer Cobos evaluated the patient    This note indicates patient awoke around 9 AM yesterday complained of left hand feeling very weak. By the time the patient arrived to emergency room and telestroke service evaluated the patient, left arm weakness had almost resolved and she had residual feeling of numbness. Her NIH score was 1. Patient has multiple medical issues. She is a diabetic with severe peripheral vascular disease. She has bilateral above-the-knee amputations due to peripheral vascular disease. She is also known to be hypertensive. Also on the chart as the presence of a left renal mass. Patient also has chronic kidney disease. 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 Left eye appears to be doing well. CT of the head performed on 11/4/2021 reported no acute cortical infarcts or other acute intracranial process.   CTA done on the same date demonstrated the following:    Impression   Critical stenosis within the right carotid siphon with extensive   atherosclerotic calcification and intraluminal plaque/thrombus with near   occlusive disease within the right cavernous ICA and proximal right   supraclinoid ICA.  The right carotid terminus is widely patent.       No evidence of high-grade stenosis, occlusion or aneurysm within the   remaining anterior intracranial circulation or throughout the posterior   intracranial circulation.       No hemodynamically significant stenosis within the cervical ICAs per NASCET   criteria.  Patent cervical vertebral arteries.       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  Infection   is not entirely excluded.       Findings were discussed with MIKIE DOUGLAS at 12:10 pm on 11/4/2021. MRI of the brain also performed at the same time demonstrated:       Impression   No acute intracranial abnormality.       Mild chronic small vessel ischemic disease and mild atrophy. Dr. Masoud Patton final assessment and recommendations are as below:    Recommendations:  NIH 1  Recommend Inpatient Neurology Consult for further assessment and evaluation    consider . BSMHNOIVTPA  Not a tPA candidate due to symptoms improving and low non eloquent NIHSS and out of window  Pt most likely had a stroke, will load pt with ASA 325mg x 1 dose and plavix 300mg x 1 dose follow by aspirin 81mg daily lifelong and plavix 75mg for 90 days for intracranial stenosis (radiology read as critical, in my opinion, this is an ICAD of moderate stenosis, no interventional needed)   lipitor 80mg daily with goal LDL less than 70  Keep BP below 200/100 for the first day and after that gradually lower it over a week  PT/OT consult    Since the events of last night patient says she is doing well no new issues per neurology are noted.     Past Medical History:     Past Medical History:   Diagnosis Date    Amputation of leg ([address]     left aka    CAD (coronary artery disease)     CAD (coronary artery disease)     Chronic back pain     Coughing     Depression     Diabetes     Diphtheria     Full dentures     GERD (gastroesophageal reflux disease) 10/10/2013    Glaucoma     PFPMHSAU(864.7)     History of blood transfusion     Hx of blood clots     Hyperlipidemia     Hypertension     IDDM (insulin dependent diabetes mellitus)     Leg pain, bilateral     LONG TERM ANTICOAGULENT USE     Neuropathy     Numbness of toes     Obesity     Osteoarthritis     Peripheral vascular disease (HCC)     Rash, skin     lt arm    Type II or unspecified type diabetes mellitus without mention of complication, not stated as uncontrolled     Wears dentures     Wears glasses     Wears glasses         Past Surgical History:     Past Surgical History:   Procedure Laterality Date    BYPASS GRAFT  2008    CATARACT REMOVAL  1980    bilateral    CORONARY ANGIOPLASTY      STENT X2    CORONARY ARTERY BYPASS GRAFT  2001    HYSTERECTOMY, TOTAL ABDOMINAL      LEG AMPUTATION THROUGH KNEE Left         Medications Prior to Admission:     Prior to Admission medications    Medication Sig Start Date End Date Taking? Authorizing Provider   insulin lispro protamine & lispro (HUMALOG MIX) (75-25) 100 UNIT per ML SUSP injection vial Inject 45 Units into the skin daily (with breakfast)   Yes Historical Provider, MD   ondansetron (ZOFRAN ODT) 4 MG disintegrating tablet Take 1 tablet by mouth every 8 hours as needed for Nausea or Vomiting 10/25/21  Yes Hayley Angeles MD   amLODIPine (NORVASC) 10 MG tablet Take 1 tablet by mouth daily 10/25/21  Yes Hayley Angeles MD   insulin lispro protamine & lispro (HUMALOG MIX) (75-25) 100 UNIT per ML SUSP injection vial Inject 35 Units into the skin nightly   Yes Historical Provider, MD   metoprolol succinate (TOPROL XL) 100 MG extended release tablet Take 150 mg by mouth daily   Yes Historical Provider, MD   pregabalin (LYRICA) 75 MG capsule Take 75 mg by mouth daily. Yes Historical Provider, MD   atorvastatin (LIPITOR) 40 MG tablet Take 1 tablet by mouth daily. 3/23/15  Yes Naty Wasserman MD   acetaminophen (TYLENOL 8 HOUR) 650 MG CR tablet Take 1 tablet by mouth every 8 hours as needed for Pain.  3/23/15  Yes Naty LAMB Jerald Bryan MD   nitroGLYCERIN (NITROSTAT) 0.3 MG SL tablet Place 1 tablet under the tongue as needed. 9/12/14  Yes Mbonu Percy Simmonds, MD   brimonidine-timolol (COMBIGAN) 0.2-0.5 % ophthalmic solution Place 1 drop into both eyes every 12 hours. Yes Historical Provider, MD   latanoprost (XALATAN) 0.005 % ophthalmic solution Place 1 drop into both eyes nightly. Yes Historical Provider, MD EPPERSON UF III MINI PEN NEEDLES 31G X 5 MM MISC use daily 6/18/15   Mbonu Percy Simmonds, MD   Alcohol Swabs (ZHOU SINGLE USE SWABS REGULAR) PADS use as directed 5/1/15   Vassie Holstein, MD   TRUETRACK TEST strip TEST two to three times a day 3/26/15   Mbonu Percy Simmonds, MD   TRUEPLUS LANCETS 33G MISC use two to three times a day 3/26/15   Mbonu Percy Simmonds, MD   Blood Glucose Monitoring Suppl (BLOOD GLUCOSE METER) KIT Test 2-3 times daily Dx: 250.00  Diabetes Mellitus 2 Insulin Dependent 3/23/15   Mbonu Percy Simmonds, MD        Allergies:     Patient has no known allergies. Social History:     Tobacco:    reports that she has quit smoking. She has never used smokeless tobacco.  Alcohol:      reports no history of alcohol use. Drug Use:  reports no history of drug use. Family History:     Family History   Problem Relation Age of Onset    Cancer Mother         stomach    High Blood Pressure Mother     Heart Disease Sister     Heart Disease Brother        Review of Systems:       Constitutional Negative for fever and chills   HEENT Negative for ear discharge, ear pain, nosebleed. Negative for photophobia, headache. Blind in right eye. Musculoskeletal  bilateral above-the-knee amputations due to peripheral vascular disease. Respiratory Negative for cough, dyspnea. Negative for hemoptysis and sputum. Cardiovascular Negative for palpitations, chest pain. Negative for orthopnea, claudication. Gastrointestinal  patient is vague abdominal discomfort which is being evaluated by nephrology and internal medicine.    Genitourinary left renal mass noted. Skin Negative for rash or itching   Hematology Negative for ecchymosis, anemia   Psychiatric Negative for anxiety, depression. Negative for suicidal ideation, hallucinations         Physical Exam:   BP (!) 162/71   Pulse 73   Temp 97.7 °F (36.5 °C) (Oral)   Resp 16   Wt 165 lb (74.8 kg)   LMP  (LMP Unknown)   SpO2 98%   BMI 27.46 kg/m²   Temp (24hrs), Av.6 °F (36.4 °C), Min:97.5 °F (36.4 °C), Max:97.7 °F (36.5 °C)        General examination:      General Appearance:  alert, well appearing, and in no acute distress  HEENT: Normocephalic, atraumatic, moist mucus membranes. Blind OD  Neck: supple, no carotid bruits, (-) nuchal rigidity  Lungs:  Respirations unlabored, chest wall no deformity  Cardiovascular: normal rate, regular rhythm  Abdomen: Soft, nontender, nondistended  Skin: No gross lesions, rashes, bruising or bleeding on exposed skin area  Extremities: Bilateral AKA's  Psych: normal affect      Neurological examination:    Mental status   Alert and oriented x 3; following all commands; speech is fluent, no dysarthria, aphasia. Memory testing is 3/3. Language shows normal reception expression repetition. Insight and judgment appears remarkably intact. Mood is cooperative. Cranial nerves   II -blind right eye  III, IV, VI - extraocular muscles show the right eye tends to drift laterally. V - normal facial sensation                                                               VII - normal facial symmetry                                                             VIII - intact hearing                                                                             IX, X -phonation and swallowing appear intact                                      XI -inactive at this time                                                   XII - midline tongue     Motor function  Strength: 5/5 RUE, 5/5 RLE, 5/5 LUE, 5/5  LLE  Normal bulk and tone.    No tremors                      Sensory function Intact to touch, pin, vibration, proprioception throughout     Cerebellar Intact finger-nose-finger testing. Intact heel-shin testing. No dysdiadochokinesia present. Reflex function 0/4 symmetric in arms. Bilateral AKA's. Gait                   not testable. Diagnostics:      Laboratory Testing:  CBC:   Recent Labs     11/04/21  0958 11/05/21  0844   WBC 6.2 3.8   HGB 13.0 12.3    188     BMP:    Recent Labs     11/04/21  0949 11/04/21  0958   NA  --  142   K  --  4.3   CL  --  107   CO2  --  27   BUN  --  21   CREATININE  --  1.09*   GLUCOSE 155 182*         Lab Results   Component Value Date    CHOL 177 11/05/2021    LDLCHOLESTEROL 93 11/05/2021    HDL 38 (L) 11/05/2021    TRIG 228 (H) 11/05/2021    ALT 12 11/04/2021    AST 14 11/04/2021    TSH 3.85 10/18/2014    INR 1.1 11/04/2021    LABA1C 8.2 (H) 01/16/2015    LABMICR 235 12/29/2014    YMEAPDPO22 797 03/20/2013     I personally reviewed all of the above medications, clinical laboratory, imaging and other diagnostic tests. Impression:      Transient weakness in left upper extremity. Stenosis of right internal carotid artery near the siphon. Blind OD. Right eye tends to drift laterally. Bilateral AKA's. Plan:     I will follow Dr. Jefferson Perez recommendations:  Recommendations:  NIH 1  Recommend Inpatient Neurology Consult for further assessment and evaluation    consider . BSMHNOIVTPA  Not a tPA candidate due to symptoms improving and low non eloquent NIHSS and out of window  Pt most likely had a stroke, will load pt with ASA 325mg x 1 dose and plavix 300mg x 1 dose follow by aspirin 81mg daily lifelong and plavix 75mg for 90 days for intracranial stenosis (radiology read as critical, in my opinion, this is an ICAD of moderate stenosis, no interventional needed)   lipitor 80mg daily with goal LDL less than 70  Keep BP below 200/100 for the first day and after that gradually lower it over a week  PT/OT consult       Thank you for this very interesting consultation.       Electronically signed by Venus Delarosa MD on 11/5/2021 at 2:11 PM      Venus Delarosa MD  [facility]  Neurology

## 2021-11-05 NOTE — ED NOTES
Report given to  RN from ED  Report method BY PHONE   The following was reviewed with receiving RN:   Current vital signs:  BP (!) 179/63   Pulse 70   Temp 98.6 °F (37 °C)   Resp 13   Wt 165 lb (74.8 kg)   LMP  (LMP Unknown)   SpO2 99%   BMI 27.46 kg/m²                      Any medication or safety alerts were reviewed. Any pending diagnostics and notifications were also reviewed, as well as any safety concerns or issues, abnormal labs, abnormal imaging, and abnormal assessment findings. Questions were answered.           Lyndsay Rodriguez RN  11/05/21 5300

## 2021-11-05 NOTE — DISCHARGE INSTR - COC
Infection Status     None to display          Nurse Assessment:  Last Vital Signs: BP (!) 162/71   Pulse 73   Temp 97.7 °F (36.5 °C) (Oral)   Resp 16   Wt 165 lb (74.8 kg)   LMP  (LMP Unknown)   SpO2 98%   BMI 27.46 kg/m²     Last documented pain score (0-10 scale): Pain Level: 8  Last Weight:   Wt Readings from Last 1 Encounters:   11/04/21 165 lb (74.8 kg)     Mental Status:  {IP PT MENTAL STATUS:20030}    IV Access:  { AYAN IV ACCESS:631861450}    Nursing Mobility/ADLs:  Walking   {P DME MZIA:885016773}  Transfer  {P DME XTBM:796489881}  Bathing  {[facility] DME AZJZ:169116782}  Dressing  {P DME YNVD:803249463}  Toileting  {P DME WPAC:737944848}  Feeding  {[facility] DME CLGA:953699236}  Med Admin  {[facility] DME YDGE:712886745}  Med Delivery   { AYAN MED Delivery:811743741}    Wound Care Documentation and Therapy:        Elimination:  Continence:   · Bowel: {YES / UY:52843}  · Bladder: {YES / LS:88795}  Urinary Catheter: {Urinary Catheter:641560365}   Colostomy/Ileostomy/Ileal Conduit: {YES / TQ:95833}       Date of Last BM: ***    Intake/Output Summary (Last 24 hours) at 11/5/2021 1817  Last data filed at 11/5/2021 1454  Gross per 24 hour   Intake 355 ml   Output --   Net 355 ml     I/O last 3 completed shifts:   In: 36 [P.O.:355]  Out: -     Safety Concerns:     [employer] Safety Concerns:621233754}    Impairments/Disabilities:      [employer] Impairments/Disabilities:383233312}    Nutrition Therapy:  Current Nutrition Therapy:   [employer] Diet List:160150252}    Routes of Feeding: {CHP DME Other Feedings:737206292}  Liquids: {Slp liquid thickness:13966}  Daily Fluid Restriction: {CHP DME Yes amt example:897048402}  Last Modified Barium Swallow with Video (Video Swallowing Test): {Done Not Done ACYP:135920467}    Treatments at the Time of Hospital Discharge:   Respiratory Treatments: ***  Oxygen Therapy:  {Therapy; copd oxygen:31265}  Ventilator:    {MH CC Vent [facility]:893640832}    Rehab Therapies: {THERAPEUTIC INTERVENTION:8783003609}  Weight Bearing Status/Restrictions: 50Meg Dyson CC Weight Bearin}  Other Medical Equipment (for information only, NOT a DME order):  {EQUIPMENT:582862174}  Other Treatments: ***    Patient's personal belongings (please select all that are sent with patient):  {CHP DME Belongings:731486974}    RN SIGNATURE:  {Esignature:783753424}    CASE MANAGEMENT/SOCIAL WORK SECTION    Inpatient Status Date: ***    Readmission Risk Assessment Score:  Readmission Risk              Risk of Unplanned Readmission:  14           Discharging to Facility/ Agency   · Name:   · Address:  · Phone:  · Fax:    Dialysis Facility (if applicable)   · Name:  · Address:  · Dialysis Schedule:  · Phone:  · Fax:    / signature: {Esignature:648980578}    PHYSICIAN SECTION    Prognosis: {Prognosis:9695460494}    Condition at Discharge: Hugo Dyson Patient Condition:828931195}    Rehab Potential (if transferring to Rehab): {Prognosis:1952293075}    Recommended Labs or Other Treatments After Discharge: ***    Physician Certification: I certify the above information and transfer of Brittany Cha  is necessary for the continuing treatment of the diagnosis listed and that she requires {Admit to Appropriate Level of Care:75297} for {GREATER/LESS:627446515} 30 days.      Update Admission H&P: {CHP DME Changes in YGGNC:892268023}    PHYSICIAN SIGNATURE:  {Esignature:259627901}

## 2021-11-05 NOTE — PROGRESS NOTES
[address]    PROGRESS NOTE             11/5/2021    1:16 PM    Name:   Harper Patient  MRN:     [id number]     Acct:      [de-identified]   Room:   2095/2095-01  IP Day:  1  Admit Date:  11/4/2021  9:33 AM    PCP:  No primary care provider on file. Code Status:  Full Code    Subjective:     C/C:   Chief Complaint   Patient presents with    Extremity Weakness     LEFT     Interval History Status: improved. Patient seen and examined at bedside. Patient states that her arm feels stronger. States that it still feels \"funny\" but the sensation has almost subsided. MRI brain did not show acute strokes. Patient is medically stable, pending neuro consult to begin discharge planning. Brief History:     Briefly this is a 73-year-old patient who presented to the ED with left extremity weakness and she was admitted to the hospital for management of acute stroke. Patient woke up with symptoms of left arm weakness that she described as clumsiness and a funny feeling. She was brought into the ED and stroke alert was called, NIHSS was 1. CT scan of the head was negative, CTA was read as ICAD. Telestroke physician decided against endovascular intervention recommended medical management. 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 Of note she has a left renal mass that she is waiting for an appointment to get biopsied as an outpatient. This mass is giving her quite a bit of pain and tenderness of the abdomen. Review of Systems:     Review of Systems   Constitutional: Negative for activity change, appetite change, chills, fatigue and fever. Respiratory: Negative for cough, chest tightness, shortness of breath and wheezing.     Cardiovascular: Negative for chest pain and palpitations. Gastrointestinal: Positive for abdominal pain. Negative for abdominal distention, constipation, diarrhea, nausea and vomiting. Left-sided abdominal pain which she attributes to renal mass   Genitourinary: Negative for difficulty urinating, dysuria, flank pain, frequency and urgency. Musculoskeletal: Negative for back pain and joint swelling. Skin: Negative for rash and wound. Neurological: Positive for weakness and numbness (Left arm). Negative for dizziness, tremors, seizures, syncope, facial asymmetry, speech difficulty, light-headedness and headaches. Psychiatric/Behavioral: Negative for confusion, dysphoric mood and sleep disturbance. The patient is not nervous/anxious. Medications:      Allergies:  No Known Allergies    Current Meds:   Scheduled Meds:    aspirin  81 mg Oral Daily    [Held by provider] amLODIPine  10 mg Oral Daily    [Held by provider] metoprolol succinate  150 mg Oral Daily    insulin lispro  0-12 Units SubCUTAneous TID WC    insulin lispro  0-6 Units SubCUTAneous Nightly    insulin glargine  25 Units SubCUTAneous Nightly    pregabalin  75 mg Oral Daily    enoxaparin  40 mg SubCUTAneous Daily    clopidogrel  75 mg Oral Daily    atorvastatin  80 mg Oral Nightly     Continuous Infusions:    dextrose       PRN Meds: ondansetron **OR** ondansetron, polyethylene glycol, labetalol, glucose, dextrose, glucagon (rDNA), dextrose, HYDROcodone 5 mg - acetaminophen, sodium chloride flush    Data:     Past Medical History:   has a past medical history of Amputation of leg ([facility] [address] CAD (coronary artery disease), CAD (coronary artery disease), Chronic back pain, Coughing, Depression, Diabetes, Diphtheria, Full dentures, GERD (gastroesophageal reflux disease), Glaucoma, Headache(784.0), History of blood transfusion, Hx of blood clots, Hyperlipidemia, Hypertension, IDDM (insulin dependent diabetes mellitus), Leg pain, bilateral, LONG TERM ANTICOAGULENT TECHNOLOGIST PROVIDED HISTORY: flank pain Decision Support Exception - unselect if not a suspected or confirmed emergency medical condition->Emergency Medical Condition (MA) Reason for Exam: left flank pain Acuity: Acute Type of Exam: Initial FINDINGS: Lower Chest: The visualized heart and lungs show no acute abnormalities. Organs: Limited evaluation the absence of IV contrast.  Liver, spleen, pancreas, adrenal glands show no significant abnormalities. There is other gallbladder wall calcification or a few tiny gallstones. No nephrolithiasis. Renal vascular calcifications. The left kidney appears somewhat amorphous with loss of the hilar fat. Absence of IV contrast limits evaluation. This may be further assessed with MRI. GI/Bowel: There is limited evaluation due to absence of oral contrast.  No bowel obstruction. Normal appendix. Pelvis: Urinary bladder shows no calculi. Hysterectomy noted. Peritoneum/Retroperitoneum: No ureteric calculus. Amorphous left para-aortic soft tissue density with some stranding not optimally assessed in the absence of IV contrast.  Possibilities include scarring, conglomerate of adenopathy and or periaortic hematoma. IVC filter in place. Bones/Soft Tissues: No acute abnormality of the bones. The superficial soft tissues show no significant abnormalities. 1. Somewhat amorphous appearance of the left kidney with loss of hilar fat representing a change from prior. Lack of IV contrast limits evaluation. Contrast enhanced CT or MRI may be helpful for further evaluation. 2. Left periaortic amorphous soft tissue extending along the length of the infrarenal aorta about 2.0 x 2.2 cm in maximal AP by transverse dimension. This has developed since 2012. Not well assessed in the absence of IV contrast.Possibilities include scarring, conglomerate of adenopathy and or periaortic hematoma. Further evaluation contrast-enhanced CT may be considered.      CT HEAD WO CONTRAST    Result Date: 11/4/2021  EXAMINATION: CT OF THE HEAD WITHOUT CONTRAST  11/4/2021 9:04 am TECHNIQUE: CT of the head was performed without the administration of intravenous contrast. Dose modulation, iterative reconstruction, and/or weight based adjustment of the mA/kV was utilized to reduce the radiation dose to as low as reasonably achievable. COMPARISON: None. HISTORY: ORDERING SYSTEM PROVIDED HISTORY: stroke TECHNOLOGIST PROVIDED HISTORY: stroke Decision Support Exception - unselect if not a suspected or confirmed emergency medical condition->Emergency Medical Condition (MA) FINDINGS: BRAIN/VENTRICLES:  No evidence of an acute infarct or other acute parenchymal process. No evidence of acute intracranial hemorrhage. There is no evidence of an intracranial mass or extraaxial fluid collection. No significant mass effect or midline shift. There is mild generalized volume loss. Ventricular enlargement concordant with the degree of parenchymal volume loss. Scattered patchy foci of low attenuation are present within supratentorial white matter which is a nonspecific finding but likely represents mild microvascular ischemia. Atherosclerotic calcification present throughout the carotid siphons and intracranial vertebral arteries. ORBITS: The visualized portion of the orbits demonstrate no acute abnormality. Mild proptosis. SINUSES:  The visualized paranasal sinuses and mastoid air cells demonstrate no acute abnormality. SOFT TISSUES/SKULL: No acute abnormality of the visualized skull or soft tissues. 1. No acute cortical infarct or other acute intracranial process. The findings were sent to the Radiology Results [address] at 9:40 am on 11/4/2021to be communicated to a licensed caregiver.      CT ABDOMEN PELVIS W IV CONTRAST Additional Contrast? None    Addendum Date: 10/21/2021    ADDENDUM: Differential considerations would include primary urothelial carcinoma, involving the left renal pelvis and ureter, with metastatic adenopathy to the retroperitoneum. Result Date: 10/21/2021  EXAMINATION: CT OF THE ABDOMEN AND PELVIS WITH CONTRAST 10/21/2021 10:55 am TECHNIQUE: CT of the abdomen and pelvis was performed with the administration of intravenous contrast. Multiplanar reformatted images are provided for review. Dose modulation, iterative reconstruction, and/or weight based adjustment of the mA/kV was utilized to reduce the radiation dose to as low as reasonably achievable. COMPARISON: CT scan dated October 21, 2021 and 25 October 2012 HISTORY: ORDERING SYSTEM PROVIDED HISTORY: pain TECHNOLOGIST PROVIDED HISTORY: pain Decision Support Exception - unselect if not a suspected or confirmed emergency medical condition->Emergency Medical Condition (MA) Reason for Exam: pain Acuity: Unknown Type of Exam: Unknown Additional signs and symptoms: PT STATES LEFT SIDED PAIN FINDINGS: Lower Chest: Dependent changes are present within the lung bases. Extensive coronary arterial calcifications are present. Organs: The liver, spleen, pancreas, and gallbladder demonstrate no acute abnormality. Cholelithiasis is present without evidence of cholecystitis. Cortical cyst formation is present on the left kidney. Abnormal soft tissue attenuation is present within the left renal pelvis, surrounding the pelvocaliceal system, extending along the course of the left ureter. .  No hydronephrosis is present. GI/Bowel: Small hiatal hernia is present. Small bowel appears normal without evidence of obstruction. The appendix is normal.  Scattered colonic diverticula are present without evidence of diverticulitis. Pelvis: Urinary bladder is incompletely distended. The uterus is surgically absent. Peritoneum/Retroperitoneum: Inferior vena cava filter is in place. Many of the legs of the filter project beyond the lumen of the inferior vena cava.  The inferior vena cava caudal to the filter is small in caliber, with heterogeneous attenuation, suggesting chronic thrombosis. Atherosclerotic changes are present throughout the course of the abdominal aorta. Abnormal soft tissue attenuation is present within the left para-aortic region with loss of the normal fat plane adjacent to the abdominal aorta. Soft tissue measures approximately 2.3 x 2 cm in greatest AP and transverse dimension. The soft tissue attenuation extends caudally within the retroperitoneum, along the course of the left ureter, into the pelvis. Bones/Soft Tissues: No acute osseous abnormality is present. 1. Abnormal soft tissue attenuation encompassing the left pelvocaliceal system, within the left renal pelvis, extending caudally along the course of the ureter, and most prominent within the left para-aortic region. Differential considerations would include lymphoma, retroperitoneal fibrosis, or potentially metastatic adenopathy within the periaortic region. Correlation with urine cytology recommended. If this is negative, CT-guided percutaneous biopsy of the percutaneous soft tissue mass should be considered. XR CHEST PORTABLE    Result Date: 11/4/2021  EXAMINATION: ONE XRAY VIEW OF THE CHEST 11/4/2021 10:03 am COMPARISON: October 24, 2020 HISTORY: ORDERING SYSTEM PROVIDED HISTORY: stroke symptoms TECHNOLOGIST PROVIDED HISTORY: stroke symptoms Reason for Exam: stroke symptoms Acuity: Acute Type of Exam: Initial FINDINGS: Stable cardiomediastinal silhouette. No pulmonary venous congestion or edema. No convincing lung consolidation or infiltrate. No pleural effusion or pneumothorax. Osseous structures grossly intact. No acute cardiopulmonary process     CTA HEAD NECK W CONTRAST    Result Date: 11/4/2021  EXAMINATION: CTA OF THE HEAD AND NECK WITH CONTRAST 11/4/2021 11:06 am: TECHNIQUE: CTA of the head and neck was performed with the administration of intravenous contrast. Multiplanar reformatted images are provided for review. MIP images are provided for review.  Stenosis of the internal carotid arteries measured using NASCET criteria. Dose modulation, iterative reconstruction, and/or weight based adjustment of the mA/kV was utilized to reduce the radiation dose to as low as reasonably achievable. COMPARISON: Concurrent head CT HISTORY: ORDERING SYSTEM PROVIDED HISTORY: Left arm weakness FINDINGS: CTA NECK: AORTIC ARCH/ARCH VESSELS: No dissection or arterial injury. No significant stenosis of the brachiocephalic or subclavian arteries. CAROTID ARTERIES: No dissection, arterial injury, or hemodynamically significant stenosis by NASCET criteria. VERTEBRAL ARTERIES: No dissection, arterial injury, or significant stenosis. SOFT TISSUES: The lung apices are clear. No cervical or superior mediastinal lymphadenopathy. The larynx and pharynx are unremarkable. No acute abnormality of the salivary and thyroid glands. Incidental small left pleural base lipoma along the anterior chest wall. BONES: Multilevel cervical spondylosis with disc osteophyte complex resulting in moderate to severe canal stenosis at C6-7. There is endplate irregularity, subchondral sclerosis and erosive changes at the C6-7 disc space likely reflecting advanced degenerative changes. CTA HEAD: ANTERIOR CIRCULATION: There is extensive atherosclerotic calcification involving the carotid siphons with internal mural thrombus/plaque within the right cavernous ICA and proximal right supraclinoid ICA resulting in near occlusive disease. The right carotid terminus is patent. Moderate multifocal stenosis throughout the left intracranial ICA. The proximal MCAs and ACAs are patent without high-grade stenosis or occlusion. No intracranial aneurysm. POSTERIOR CIRCULATION: No significant stenosis of the vertebral, basilar, or posterior cerebral arteries. No aneurysm. OTHER: No dural venous sinus thrombosis on this non-dedicated study. BRAIN: No mass effect or midline shift. No extra-axial fluid collection.  The gray-white differentiation is maintained. Critical stenosis within the right carotid siphon with extensive atherosclerotic calcification and intraluminal plaque/thrombus with near occlusive disease within the right cavernous ICA and proximal right supraclinoid ICA. The right carotid terminus is widely patent. No evidence of high-grade stenosis, occlusion or aneurysm within the remaining anterior intracranial circulation or throughout the posterior intracranial circulation. No hemodynamically significant stenosis within the cervical ICAs per NASCET criteria. Patent cervical vertebral arteries. 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 Infection is not entirely excluded. Findings were discussed with MIKIE DOUGLAS at 12:10 pm on 11/4/2021. MRI BRAIN WO CONTRAST    Result Date: 11/4/2021  EXAMINATION: MRI OF THE BRAIN WITHOUT CONTRAST  11/4/2021 7:23 pm TECHNIQUE: Multiplanar multisequence MRI of the brain was performed without the administration of intravenous contrast. COMPARISON: None. HISTORY: Reason for Exam: pt woke up with left arm numbness/weakness, stroke alert called earlier FINDINGS: INTRACRANIAL STRUCTURES/VENTRICLES: There is no acute infarct. Mild periventricular and deep white matter T2/FLAIR hyperintensity, most consistent with chronic small vessel ischemic disease. No mass effect or midline shift. No evidence of an acute intracranial hemorrhage. The ventricles and sulci are prominent secondary to mild atrophy. The sellar/suprasellar regions appear unremarkable. The normal signal voids within the major intracranial vessels appear maintained. ORBITS: The visualized portion of the orbits demonstrate no acute abnormality. SINUSES: The visualized paranasal sinuses and mastoid air cells are well aerated. BONES/SOFT TISSUES: The bone marrow signal intensity appears normal. The soft tissues demonstrate no acute abnormality. No acute intracranial abnormality.  Mild chronic small vessel ischemic disease and mild atrophy. Physical Examination:        Physical Exam  Constitutional:       General: She is not in acute distress. HENT:      Mouth/Throat:      Mouth: Mucous membranes are moist.      Pharynx: Oropharynx is clear. Eyes:      Extraocular Movements: Extraocular movements intact. Conjunctiva/sclera: Conjunctivae normal.      Pupils: Pupils are equal, round, and reactive to light. Cardiovascular:      Rate and Rhythm: Normal rate and regular rhythm. Pulses: Normal pulses. Heart sounds: Normal heart sounds. No murmur heard. Pulmonary:      Effort: Pulmonary effort is normal.      Breath sounds: Normal breath sounds. No wheezing or rales. Abdominal:      General: There is no distension. Palpations: Abdomen is soft. Tenderness: There is abdominal tenderness. There is no guarding. Musculoskeletal:         General: Normal range of motion. Cervical back: Normal range of motion and neck supple. Comments: Bilateral lower limb above-the-knee amputation   Skin:     General: Skin is warm and dry. Capillary Refill: Capillary refill takes less than 2 seconds. Coloration: Skin is not pale. Findings: No lesion. Comments: Hirsutism   Neurological:      Mental Status: She is alert and oriented to person, place, and time. Mental status is at baseline. Cranial Nerves: Cranial nerves are intact. No cranial nerve deficit, dysarthria or facial asymmetry. Sensory: No sensory deficit. Motor: Weakness (Strength 5/5 in right upper limb, 5-/5 in left upper limb) present. No tremor or atrophy. Coordination: Coordination is intact. Coordination normal.      Deep Tendon Reflexes: Reflexes normal.   Psychiatric:         Mood and Affect: Mood normal.         Behavior: Behavior normal.         Thought Content:  Thought content normal.         Judgment: Judgment normal.           Assessment:        Primary Problem  Acute ischemic stroke [facility])    Active Hospital Problems    Diagnosis Date Noted    Acute ischemic stroke [facility]) [I63.9] 11/04/2021     Priority: High    Diabetes mellitus, type 2 [E11.9]        Plan:        Acute ischemic stroke  Neurology consult  Lifelong aspirin 81 mg daily, Plavix 75 mg for 90 days  Lipitor 80 mg daily with goal LDL less than 70  As needed labetalol to keep BP below 200/100 for the first day, all home antihypertensive held. Gradually lower blood pressure over a week.   Permissive hypertension  MRI was read as negative for acute stroke     Type 2 diabetes mellitus, insulin-dependent  Medium dose insulin sliding scale with hypoglycemic protocol   25 units of Lantus nightly    Dispo: Discharge home based on neurology recommendations  Lovenox 40 mg for DVT prophylaxis    Young Zamarripa MD  11/5/2021  1:16 PM

## 2021-11-07 LAB
ESTIMATED AVERAGE GLUCOSE: 235 MG/DL
HBA1C MFR BLD: 9.8 % (ref 4–6)

## 2021-11-10 ENCOUNTER — TELEPHONE (OUTPATIENT)
Dept: INTERNAL MEDICINE CLINIC | Age: 81
End: 2021-11-10

## 2021-12-07 ENCOUNTER — TELEPHONE (OUTPATIENT)
Dept: INTERNAL MEDICINE CLINIC | Age: 81
End: 2021-12-07

## 2021-12-07 NOTE — TELEPHONE ENCOUNTER
LM on pts phone regarding her new pt no show appt pearl chavarria/ Chasidy Nava on 12/7/21. Ltr mailed.

## 2021-12-07 NOTE — LETTER
Velia Harding  [address] [place] [phone]          December 7, 2021    Dear Velia Harding: You recently missed a scheduled new patient appointment with Antoinette Rivas CNP on 12/7/21. This is the 2nd scheduledappointment that you have missed within the last twelve months. If you miss 1 more appointment, you may be dismissed from the practice. It is your responsibility to arrive to your appointment. Please call our office as soon as possible so that we may reschedule your appointment, because your health and follow-up medical care are important to us. For future appointments that you are unable to keep, please call the office at [phone] at least 24 hours in advance to cancel and reschedule. If a traditional appointment is difficult for you to make, please keep in mind, we offer E-Visits for several diagnoses as well as virtual visits. We also have primary care walk-in clinics available. For more information on these options, please contact our office.    Sincerely,        [address] Herbert Moreno

## 2022-02-28 NOTE — ED TRIAGE NOTES
..    2/28/2022 1:33 PM    Patient: Hanh Beasley, 80 y.o., female Race: -American  Patient Address: [address] [facility] Ruchi Bo [address] [place] [address] [place] [address]  Incident Address:  [x]Same as patient address     [x] [facility]  [] [facility]  [] [facility] [place]   [] [facility] ENCARNACION JR[facility]  [] [facility]  Patient Phone #: [phone]   Insurance: Payor: Barndt Menendez /  /  /   Maty Brigid:  []  Yes   [x]  No  Emergency Contact: Extended Emergency Contact Information  Primary Emergency Contact: [address] [place] [address] Phone: [phone]  Mobile Phone: [phone]  Relation: Child  Secondary Emergency Contact: [address] [place] [address] Phone: [phone]  Relation: Child  MRN: [id number]  [facility]# [id number]  YOB: 1940  Primary Care Physician: No primary care provider on file. Advance Directive: [x] Full Code   []DNR-CC   []DNR-CCA    MSU Crew:  Alisha Schwab - CT Tech, T Druhot - Paramedic,  Mc Zacarias - RN    Pt Transported To:  [] [place] [address]  [] [facility]  [x] [facility]  [] [facility]  [] [facility]  [] [facility]  [facility] [] [facility] [] [facility]    Was patient transported to closest facility? [x]Yes  []No (if No specify reason)   []   Patient/family request    []   Divert to specialist       Response Code   [] 2  [x] 3  []   Change  [] 2  [] 3   Transport Code   [x] 2  [] 3  []   Change  [] 2  [] 3     Mileage:  [address] [facility] [id number]   [facility] 1.7     Call Received 2/28/2022 0841   Dispatched 2/28/2022 0844   Enroute 2/28/2022 0848   Arrived Scene 2/28/2022 0852   At Patient 2/28/2022 0854   Decision to Scan 2/28/2022 0855   Scan 2/28/2022 6035   Departed Scene 2/28/2022 [address] 2/28/2022 [address] 2/28/2022 0945   In Service 2/28/2022 0945         Allergies  [] Updated/Reviewed by MSU  [x] Historical Data Only  [] Unavailable  has No Known Allergies.   Medications  [] Updated/Reviewed by MSU  [x] Historical Data Only  [] Unavailable  Prior to Admission medications    Medication Sig Start Date End Date Taking? Authorizing Provider   oxyCODONE-acetaminophen (PERCOCET) 5-325 MG per tablet  11/1/21   Historical Provider, MD   Continuous Blood Gluc Sensor (FREESTYLE NICKIE 14 DAY SENSOR) [facility]  10/25/21   Historical Provider, MD   aspirin 81 MG EC tablet Take 1 tablet by mouth daily 11/6/21   Flynn Lara MD   amLODIPine (NORVASC) 10 MG tablet Take 1 tablet by mouth daily 11/5/21   Flynn Lara MD   atorvastatin (LIPITOR) 80 MG tablet Take 1 tablet by mouth nightly 11/5/21   Flynn Lara MD   metoprolol succinate (TOPROL XL) 50 MG extended release tablet Take 3 tablets by mouth daily 11/7/21   Flynn Lara MD   clopidogrel (PLAVIX) 75 MG tablet Take 1 tablet by mouth daily for 89 doses 11/6/21 2/3/22  Flynn Lara MD   insulin lispro protamine & lispro (HUMALOG MIX) (75-25) 100 UNIT per ML SUSP injection vial Inject 45 Units into the skin daily (with breakfast)    Historical Provider, MD   ondansetron (ZOFRAN ODT) 4 MG disintegrating tablet Take 1 tablet by mouth every 8 hours as needed for Nausea or Vomiting 10/25/21   Luh Rowland MD   insulin lispro protamine & lispro (HUMALOG MIX) (75-25) 100 UNIT per ML SUSP injection vial Inject 35 Units into the skin nightly    Historical Provider, MD   pregabalin (LYRICA) 75 MG capsule Take 75 mg by mouth daily. Historical Provider, MD EPPERSON UF III MINI PEN NEEDLES 31G X 5 MM MISC use daily 6/18/15   Naty Terry MD   Alcohol Swabs (B-D SINGLE USE SWABS REGULAR) PADS use as directed 5/1/15   Angella Alanis MD   TRUETRACK TEST strip TEST two to three times a day 3/26/15   Naty Terry MD   TRUEPLUS LANCETS 33G MISC use two to three times a day 3/26/15   Naty Terry MD   acetaminophen (TYLENOL 8 HOUR) 650 MG CR tablet Take 1 tablet by mouth every 8 hours as needed for Pain.  3/23/15   Naty Terry MD   Blood Glucose Monitoring Suppl (BLOOD GLUCOSE METER) KIT Test 2-3 times daily Dx: 250.00  Diabetes Mellitus 2 Insulin Dependent 3/23/15 Naty Vigil MD   nitroGLYCERIN (NITROSTAT) 0.3 MG SL tablet Place 1 tablet under the tongue as needed. 9/12/14   Naty Vigil MD   brimonidine-timolol (COMBIGAN) 0.2-0.5 % ophthalmic solution Place 1 drop into both eyes every 12 hours. Historical Provider, MD   latanoprost (XALATAN) 0.005 % ophthalmic solution Place 1 drop into both eyes nightly. Historical Provider, MD      Past Medical History  [] Updated/Reviewed by MSU  [x] Historical Data Only  [] Unavailable   has a past medical history of Amputation of leg ([facility] [address] CAD (coronary artery disease), CAD (coronary artery disease), Chronic back pain, Coughing, Depression, Diabetes, Diphtheria, Full dentures, GERD (gastroesophageal reflux disease), Glaucoma, Headache(784.0), History of blood transfusion, Hx of blood clots, Hyperlipidemia, Hypertension, IDDM (insulin dependent diabetes mellitus), Leg pain, bilateral, LONG TERM ANTICOAGULENT USE, Neuropathy, Numbness of toes, Obesity, Osteoarthritis, Peripheral vascular disease ([facility] [address] Rash, skin, Type II or unspecified type diabetes mellitus without mention of complication, not stated as uncontrolled, Wears dentures, Wears glasses, and Wears glasses. Patient Weight: 150 lbs   [x]   Estimated   []   Stated          VITALS          Time BP Pulse   Resp  Rate N-normal  S-shallow  D-deep Monitor SpO2 O2    LPM BGL   Temp Pain   0908 177/76 94 10 N NSR 97  NA NA   0910 174/81 94 10 N NSR 97 RA      0915 207/74 97 20 N ST 97 RA      0921 190/71 94 12 N NSR 95 RA      0925 197/68 97 15 N ST 97 RA         Pupils GCS   Time R L E  4 V  5 M  6 T  15   0855 blind 2 4 5 6 15                                           NIH -   record on all transports and Q15 min after tPA administration    NIHSS       Time 0855 0910     1a. LOC - Arousal Status 0      1b. LOC - Questions 0      1c. LOC - Commands 0      2. Eye Movements (gaze) 0      3. Visual Fields 0      4. Facial Palsy 0      5a.  Motor Left Arm 2 1 5b. Motor Right Arm 0      6a. Motor Left stump 0      6b. Motor Right stump 0      7. Limb Ataxia 0      8. Sensory 0      9. Language/Aphasia 0      10. Dysarthria 0      11. Neglect 0      TOTAL 2 1         Research:    RACE Score: 2 Time: 6916   RACE Score: 1 Time: 0910  StrokeVAN Score: negTime:0855      Time Last Known Well: 2200 11/03/2021    Narrative:    Dispatched to possible CVA per [facility]. Arrived to find [place], 80 y.o., female c/o unable to move left arm. Report received from [facility] EMS at patients side. Harper De Dios available for consult. EMS states the pt went to bed last night feeling fine and woke up this morning and was unable to move her left arm. Upon our arrival pt is A/O x 3 her only deficit was her left arm was flaccid. CT scan completed and pt transported to closest facility. Following CT scan pt now having some gross motor movement of her left arm. Pt able to grasp with left hand. Unable to establish IV access at this time. Pt states she is a difficult IV start. Report called to [facility] ER along with ETA. Patient received the following medications prior to MSU arrival NA  Patient has the following lines prior to MSU arrival: NA  Patient has the following airway placed prior to MSU arrival: NA    Patient was transferred to cot via 2 person assist and secured with straps x3. Zoll ECG, SpO2, and NIBP applied and monitored throughout encounter. HOB maintained at 30 degrees. Patient was transferred to ambulance, cot secured in scan position. Non contrast CT scan performed. After scan cot secured in transport position. IV tPA inclusion/exclusion criteria reviewed with patient and physician. Candidate for IV tPA therapy?   [] Yes   [x] No - due to the following exclusion criteria:    [] ICH   [] Taking anticoagulant -   [x] Beyond last time known well window  [] At or returned to baseline   [] Marked improvement of symptoms  [] No disabling symptoms  [] [facility]   During transport patient rests comfortably. Cabin temp maintained at 70-72 °F throughout transport. Patient was transferred to bed ER # 2 via 4 person sheet lift. . Patient care handoff completed with Priyanka JIMENEZ at bedside. Imaging:  Images were obtained onboard the MSU including:  [x] CT brain without contrast - see results below:      CT HEAD WO CONTRAST    Result Date: 11/4/2021  EXAMINATION: CT OF THE HEAD WITHOUT CONTRAST  11/4/2021 9:04 am TECHNIQUE: CT of the head was performed without the administration of intravenous contrast. Dose modulation, iterative reconstruction, and/or weight based adjustment of the mA/kV was utilized to reduce the radiation dose to as low as reasonably achievable. COMPARISON: None. HISTORY: ORDERING SYSTEM PROVIDED HISTORY: stroke TECHNOLOGIST PROVIDED HISTORY: stroke Decision Support Exception - unselect if not a suspected or confirmed emergency medical condition->Emergency Medical Condition (MA) FINDINGS: BRAIN/VENTRICLES:  No evidence of an acute infarct or other acute parenchymal process. No evidence of acute intracranial hemorrhage. There is no evidence of an intracranial mass or extraaxial fluid collection. No significant mass effect or midline shift. There is mild generalized volume loss. Ventricular enlargement concordant with the degree of parenchymal volume loss. Scattered patchy foci of low attenuation are present within supratentorial white matter which is a nonspecific finding but likely represents mild microvascular ischemia. Atherosclerotic calcification present throughout the carotid siphons and intracranial vertebral arteries. ORBITS: The visualized portion of the orbits demonstrate no acute abnormality. Mild proptosis. SINUSES:  The visualized paranasal sinuses and mastoid air cells demonstrate no acute abnormality.  SOFT TISSUES/SKULL: No acute abnormality of the visualized skull or soft tissues. 1. No acute cortical infarct or other acute intracranial process. The findings were sent to the Radiology Results [address] at 9:40 am on 11/4/2021to be communicated to a licensed caregiver. Physical assessment performed:    Neuro: Pt is A/O x3 Pt is blind in right eye. Pt has no facial droop. Pt has a weaker hand grasp on the left. Pt left arm is flaccid. Pt has no deficits on the right. Pt is a bilat above the knee amputee but is able to lift up both stumps. Pt has no sensory deficits  Resp: lungs clear to auscultation bilaterally, normal effort  Cardiac: regular rate, no murmur, 12 lead ECG shows NSR  Muscular/skeletal/peripheral vascular: no edema, pt has bilat above the knee amputation. Abd/GI/: abd obtunded, soft, non tender, bowel sounds present x 4 quadrants   Skin: normal color, warm, dry      IV LINES   Time Size Site # Attempts Performed By   NA                         Total Amount of IV Fluids Infused: NA    MEDS / ELECTRICAL RX   Time Therapy Dosage Route Response Performed By   NA                                                    TPA Administration    Time Bolus Dose Infusion Dose Waste   NA                [] tPA dosing double checked by:           ACUTE STROKE DYSPHAGIA SCREEN              YES NO   1 GCS less than 13?         2 Facial Asymmetry or Weakness? 3 Tongue Asymmetry or Weakness? 4 Palatal Asymmetry or Weakness? 5 Signs of aspiration during 3oz water test?*                 If answers to questions 1-4 are NO proceed to 3oz water test               *Administer 3oz of water for sequential drinks, note any throat clearing, cough, or change in vocal quality immediately after and 1 minute following the swallow.                If answers to questions 1-5 are NO proceed with ordered PO meds       POC LABS      TIME NA     Test (reference range)      FSBG ()      PT (11-13.5)      INR (0.8-1.1)      Na (138-146)      K (3.5-4.9)      Cl ()

## 2022-09-21 ENCOUNTER — HOSPITAL ENCOUNTER (OUTPATIENT)
Dept: PHYSICAL THERAPY | Age: 82
Setting detail: THERAPIES SERIES
Discharge: HOME | End: 2022-09-21
Payer: MEDICARE

## 2022-09-21 ENCOUNTER — INITIAL CONSULT (OUTPATIENT)
Dept: PHYSICAL MEDICINE AND REHAB | Age: 82
End: 2022-09-21
Payer: MEDICARE

## 2022-09-21 VITALS — HEART RATE: 64 BPM | DIASTOLIC BLOOD PRESSURE: 55 MMHG | SYSTOLIC BLOOD PRESSURE: 142 MMHG | TEMPERATURE: 97.9 F

## 2022-09-21 DIAGNOSIS — I25.10 CORONARY ARTERY DISEASE INVOLVING NATIVE CORONARY ARTERY OF NATIVE HEART WITHOUT ANGINA PECTORIS: ICD-10-CM

## 2022-09-21 DIAGNOSIS — M19.012 PRIMARY OSTEOARTHRITIS OF BOTH SHOULDERS: ICD-10-CM

## 2022-09-21 DIAGNOSIS — E11.22 TYPE 2 DIABETES MELLITUS WITH CHRONIC KIDNEY DISEASE, WITHOUT LONG-TERM CURRENT USE OF INSULIN, UNSPECIFIED CKD STAGE (HCC): ICD-10-CM

## 2022-09-21 DIAGNOSIS — M19.011 PRIMARY OSTEOARTHRITIS OF BOTH SHOULDERS: ICD-10-CM

## 2022-09-21 DIAGNOSIS — Z89.612 S/P AKA (ABOVE KNEE AMPUTATION) BILATERAL (HCC): Primary | ICD-10-CM

## 2022-09-21 DIAGNOSIS — Z89.611 S/P AKA (ABOVE KNEE AMPUTATION) BILATERAL (HCC): Primary | ICD-10-CM

## 2022-09-21 PROCEDURE — G8417 CALC BMI ABV UP PARAM F/U: HCPCS | Performed by: PHYSICAL MEDICINE & REHABILITATION

## 2022-09-21 PROCEDURE — 1090F PRES/ABSN URINE INCON ASSESS: CPT | Performed by: PHYSICAL MEDICINE & REHABILITATION

## 2022-09-21 PROCEDURE — 1123F ACP DISCUSS/DSCN MKR DOCD: CPT | Performed by: PHYSICAL MEDICINE & REHABILITATION

## 2022-09-21 PROCEDURE — G8400 PT W/DXA NO RESULTS DOC: HCPCS | Performed by: PHYSICAL MEDICINE & REHABILITATION

## 2022-09-21 PROCEDURE — 99203 OFFICE O/P NEW LOW 30 MIN: CPT | Performed by: PHYSICAL MEDICINE & REHABILITATION

## 2022-09-21 PROCEDURE — 1036F TOBACCO NON-USER: CPT | Performed by: PHYSICAL MEDICINE & REHABILITATION

## 2022-09-21 PROCEDURE — G8427 DOCREV CUR MEDS BY ELIG CLIN: HCPCS | Performed by: PHYSICAL MEDICINE & REHABILITATION

## 2022-09-21 PROCEDURE — 97161 PT EVAL LOW COMPLEX 20 MIN: CPT

## 2022-09-21 RX ORDER — HYDROCHLOROTHIAZIDE 25 MG/1
25 TABLET ORAL DAILY
COMMUNITY

## 2022-09-21 RX ORDER — VALSARTAN 160 MG/1
160 TABLET ORAL DAILY
COMMUNITY

## 2022-09-21 NOTE — PROGRESS NOTES
[address] [facility] PHYSICAL MEDICINE & REHABILITATION  [address] [place] [address]  Dept: [phone]  Dept Fax: [phone]    Power Mobility Device Evaluation    Shannon Leroy  1940 80 y.o.  9/21/2022    HPI:     Reason for visit  Mobility Examination  Gait Abnormality     Ms. 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 CKD, CAD s/p CABG and stent, history of CVA. She is requesting evaluation for power wheelchair. Patient currently uses a wheelchair, shower bench with back, and hospital bed to navigate in the home. The patient currently resides apartment on 1st floor with 0 stairs to enter  The patient is able to transfer via slide board and scooting. The patient has not had falls at home - but last was 3-4 years ago. She is not on oxygen. She requires assistance for bathing, hygiene, dressing upper body, dressing lower body, and meal preparation. Areas of pain or weakness: headaches, B shoulder pain and weakness, stabbing pain can reach 9/10 at its worst. 6/10 at its best, worse in R shoulder than left. She had x-ray ordered by her PCP a few months ago (results below) with no treatment initiated as of yet. Has home health aide TIW for 4 hours.      Past Medical History:   Diagnosis Date    Amputation of leg ([address]     left aka    CAD (coronary artery disease)     CAD (coronary artery disease)     Chronic back pain     Coughing     Depression     Diabetes     Diphtheria     Full dentures     GERD (gastroesophageal reflux disease) 10/10/2013    Glaucoma     Headache(784.0)     History of blood transfusion     Hx of blood clots     Hyperlipidemia     Hypertension     IDDM (insulin dependent diabetes mellitus)     Leg pain, bilateral     LONG TERM ANTICOAGULENT USE     Neuropathy     Numbness of toes     Obesity Osteoarthritis     Peripheral vascular disease (HCC)     Rash, skin     lt arm    Type II or unspecified type diabetes mellitus without mention of complication, not stated as uncontrolled     Wears dentures     Wears glasses     Wears glasses       Past Surgical History:   Procedure Laterality Date    BYPASS GRAFT  2008    CATARACT REMOVAL  1980    bilateral    CORONARY ANGIOPLASTY      STENT X2    CORONARY ARTERY BYPASS GRAFT  2001    LEG AMPUTATION THROUGH KNEE Left     TOTAL ABDOMINAL HYSTERECTOMY       Family History   Problem Relation Age of Onset    Cancer Mother         stomach    High Blood Pressure Mother     Heart Disease Sister     Heart Disease Brother      Social History     Socioeconomic History    Marital status: Single   Tobacco Use    Smoking status: Former    Smokeless tobacco: Never   Substance and Sexual Activity    Alcohol use: No    Drug use: No         Current Outpatient Medications   Medication Sig Dispense Refill    hydroCHLOROthiazide (HYDRODIURIL) 25 MG tablet Take 25 mg by mouth daily      valsartan (DIOVAN) 160 MG tablet Take 160 mg by mouth daily      empagliflozin (JARDIANCE) 10 MG tablet Take 10 mg by mouth daily      aspirin 81 MG EC tablet Take 1 tablet by mouth daily 30 tablet 3    amLODIPine (NORVASC) 10 MG tablet Take 1 tablet by mouth daily 30 tablet 3    atorvastatin (LIPITOR) 80 MG tablet Take 1 tablet by mouth nightly (Patient taking differently: Take 40 mg by mouth nightly) 30 tablet 3    clopidogrel (PLAVIX) 75 MG tablet Take 1 tablet by mouth daily for 89 doses 89 tablet 0    oxyCODONE-acetaminophen (PERCOCET) 5-325 MG per tablet  (Patient not taking: Reported on 9/21/2022)      Continuous Blood Gluc Sensor (FREESTYLE NICKIE 14 DAY SENSOR) MISC  (Patient not taking: Reported on 9/21/2022)      metoprolol succinate (TOPROL XL) 50 MG extended release tablet Take 3 tablets by mouth daily (Patient not taking: Reported on 9/21/2022) 30 tablet 3    insulin lispro protamine & lispro (HUMALOG MIX) (75-25) 100 UNIT per ML SUSP injection vial Inject 45 Units into the skin daily (with breakfast) (Patient not taking: Reported on 9/21/2022)      ondansetron (ZOFRAN ODT) 4 MG disintegrating tablet Take 1 tablet by mouth every 8 hours as needed for Nausea or Vomiting (Patient not taking: Reported on 9/21/2022) 30 tablet 0    insulin lispro protamine & lispro (HUMALOG MIX) (75-25) 100 UNIT per ML SUSP injection vial Inject 35 Units into the skin nightly (Patient not taking: Reported on 9/21/2022)      pregabalin (LYRICA) 75 MG capsule Take 75 mg by mouth daily. (Patient not taking: Reported on 9/21/2022)      B-D UF III MINI PEN NEEDLES 31G X 5 MM MISC use daily (Patient not taking: Reported on 9/21/2022) 60 each 3    Alcohol Swabs (B-D SINGLE USE SWABS REGULAR) PADS use as directed (Patient not taking: Reported on 9/21/2022) 100 each 0    TRUETRACK TEST strip TEST two to three times a day (Patient not taking: Reported on 9/21/2022) 100 each 3    TRUEPLUS LANCETS 33G MISC use two to three times a day (Patient not taking: Reported on 9/21/2022) 100 each 3    acetaminophen (TYLENOL 8 HOUR) 650 MG CR tablet Take 1 tablet by mouth every 8 hours as needed for Pain. (Patient not taking: Reported on 9/21/2022) 90 tablet 3    Blood Glucose Monitoring Suppl (BLOOD GLUCOSE METER) KIT Test 2-3 times daily Dx: 250.00  Diabetes Mellitus 2 Insulin Dependent (Patient not taking: Reported on 9/21/2022) 1 kit 0    nitroGLYCERIN (NITROSTAT) 0.3 MG SL tablet Place 1 tablet under the tongue as needed. (Patient not taking: Reported on 9/21/2022) 100 tablet 2    brimonidine-timolol (COMBIGAN) 0.2-0.5 % ophthalmic solution Place 1 drop into both eyes every 12 hours. (Patient not taking: Reported on 9/21/2022)      latanoprost (XALATAN) 0.005 % ophthalmic solution Place 1 drop into both eyes nightly. (Patient not taking: Reported on 9/21/2022)       No current facility-administered medications for this visit. Facility-Administered Medications Ordered in Other Visits   Medication Dose Route Frequency Provider Last Rate Last Admin    0.9 % sodium chloride infusion   IntraVENous Continuous Claudia Midget, DO         No Known Allergies    Subjective:      Review of Systems  Constitutional: Negative for fever, chills and unexpected weight change. HENT: Negative for trouble swallowing. Respiratory: Positive for cough and shortness of breath. Cardiovascular: Negative for chest pain. Endocrine: Negative for polyuria. Genitourinary:Negative for dysuria, urgency, frequency and difficulty urinating. Musculoskeletal: Positive for back pain and arthralgias. Neurological: Positive for headaches. Objective:     Physical Exam  BP (!) 142/55   Pulse 64   Temp 97.9 °F (36.6 °C)   LMP  (LMP Unknown)   Constitutional: She is oriented to person, place, and time. She appears well-developed and well-nourished. HENT:   Head: Normocephalic. Eyes: EOM are normal.   Pulmonary/Chest: Effort normal. SOB after transfer to mat from wheelchair, SOB with lying flat  Neurological:She is alert and oriented to person, place, and time. Reflexes not tested - short residual B transfemoral residual limbs. MSK:  Upper extremity: L shoulder abduction 100 degrees, L shoulder flexion 130 degrees. 120 degrees R shoulder flexion and abduction    Strength: L shoulder flexion, 4/5, L shoulder abduction 4-/5.   4-/5 R shoulder flexion and abduction. B biceps and triceps 4-/5. Lower Extremity: Full B hip ROM. Strength: 4-/5 B hip flexion, extension, abduction and adduction    Transfers: Performs slide transfer with min assist    Skin: Skin is warm dry and intact with good turgor. Psychiatric: She has a normal mood and affect. Her behavior is normal. Thought content normal.     Medical assistant note and vitals for today's encounter reviewed. Imaging:     B SHOULDER XRAY 4/13/2022    *  No acute fracture or dislocation. *  Degenerative changes of the acromioclavicular and glenohumeral   joint. Assessment:       Diagnosis Orders   1. S/P AKA (above knee amputation) bilateral ([facility] [address]        2. Type 2 diabetes mellitus with chronic kidney disease, without long-term current use of insulin, unspecified CKD stage ([facility] [address]        3. Primary osteoarthritis of both shoulders        4. Coronary artery disease involving native coronary artery of native heart without angina pectoris               Plan:       would benefit from a power wheelchair. 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 CKD, CAD s/p CABG and stent, history of CVA. She has difficulty mobilizing to perform transfers and ADLs in a timely manner. Ms. Nikki Wolfe  is able to safely transfer to and from the power mobility device. She  is able to maintain postural stability and position while operating the power mobility device. Ms. Nikki Wolfe  cannot  safely use a cane or walker due to B transfemoral amputations - unable to tolerate prosthetics. She is unable to use a manual wheelchair in the home due to B shoulder osteoarthritis with pain and weakness. She cannot use a scooter. A power wheelchair will improve my patient's ability to maneuver in the home. She does have the physical and mental abilities to operate a power mobility device safely, and is willing and motivated to do so. No orders of the defined types were placed in this encounter. No orders of the defined types were placed in this encounter. Return in about 6 weeks (around 11/2/2022). For B shoulder OA evaluation. Electronically signed by Craig Goldstein MD on 9/21/2022 at 3:56 PM.     Please note that this chart was generated using voice recognition Dragon dictation software.   Although every effortwas made to ensure the accuracy of this automated transcription, some errors in transcription may have occurred.

## 2022-09-26 NOTE — CONSULTS
[x] [facility]) - [facility] & Therapy  [address]  [place]: [phone]   F: [phone] [] [address]  [address]  [phone]  F: [phone]        Physical Therapy Wheelchair  Evaluation    Date:  2022  Patient: Velia Harding  : 1940  MRN: [id number]  Physician: Dr. Priya Summers: Zoraida Alvarado and Sumaya Evangelista; Demetriolizett Garciabob after eval  Medical Diagnosis: Acute ischemic stroke [facility]) [I63.9]   Rehab Codes: Z74.0 impaired mobility   Date of symptom onset: 2022    Subjective:   Pt arrives to clinic with nursing aide, using manual wheelchair for mobility. Pt is agreeable to wheelchair evaluation. Rakan Valentine present for evaluation. Patient has a history of multiple medical problems that impair mobility. Patient has bilateral above knee amputations, recent ischemic stroke, diabetes, neuropathy in bilateral upper extremities, right carpal tunnel surgery, and osteoarthritis of bilateral shoulders. Falls History: No falls in 3-4 years. Per Note from PMR:   22, Dr. Maya Hunt, PCP at [facility]    Assessment Note   Right shoulder pain: Pain radiates down arm to hand. Range of motion preserved. Possible radiculopathy. Check x-ray of right shoulder and c-spine. Advised to schedule appointment with orthopedics. Referral placed again. Essential hypertension: Blood pressure elevated in office today at 162/64. History of not being adherent with anti-hypertensive medication. Educated on risks of uncontrolled hypertension. Follow up in 2-4 weeks and advised to bring in medications with her along with blood pressure log of daily readings. Type 2 diabetes mellitus: A1C of 9.6% on 2022.  Currently on novolog 70-30 with 40 units in the AM and 30 units in the PM (was on Humalog 75-25 45 units in the morning and 35 units in the evening but had to be changed due to insurance formulary). Started on jardiance 10 mg daily on 4/13/2022. Not on Metformin. Patient has a history of reduced kidney function but most recent one okay. Patient also had intermittent episodes of hypoglycemia but non recently. Has neuropathy currently on Lyrica 75 mg daily and will continue this. Will consider restarting metformin if renal function remains stable. Recheck A1C. Last saw her opthalmologist at [facility] on 3/18/2022. Chronic kidney disease stage II/III: Creatinine on 4/12/2022 was 0.91 with a estimated GFR >60. Is stable. Will be seeing a nephrologist with [facility] per patient. Abnormal lymph nodes left of the infrarenal abdominal aorta: Unclear etiology. Denies any weight loss, fevers or night sweats previously. CT report listed below. Patient had CT renal survey on 9/11/2021 which showed â? ? Prominent lymph nodes present to the left of the infrarenal abdominal aorta of uncertain significance. This could be caused by either infectious/inflammatory process is or neoplasm. â? CT abd/pelvis with contrast from 10/21/2021 showed â? ? Abnormal soft tissue attenuation encompassing the left pelvocaliceal system, within the left renal pelvis, extending caudally along the course of the ureter, and most prominent within the left para-aortic region. Differential considerations would include lymphoma, retroperitoneal fibrosis, or potentially metastatic adenopathy within the periaortic region. Correlation with urine cytology recommended. If this is negative, CT-guided percutaneous biopsy of the percutaneous soft tissue mass should be considered. Differential considerations would include primary urothelial carcinoma, involving the left renal pelvis and ureter, with metastatic adenopathy to the retroperitoneum. â? Patient had cystoscopy by [facility] on 11/19/2021 and no mass/tumors seen.  Will repeat CT abd/pelvis with contrast and if still showing soft tissue mass, will consider CT guided biopsy. Congestive heart failure and coronary atherosclerosis status post stents: Currently on aspirin and Plavix and beta blocker. Bilateral above the knee amputation: Has appointment with seating clinic on 2022. Hx of CVA in : Continue ASA and statin. Patient stated Goals: To obtain a Power Mobility Device, specifically a Group 3 Power chair    Pain:  [x] Yes  [] No  Pain ratin-9/10   Location: Bilateral arms, specifically with overhead movements    Pain descriptors: weak, stabbing pain in shoulders      Any recent issues with current WC or mobility devices?    Currently has manual wheelchair    PMHx:   Past Medical History:   Diagnosis Date    Amputation of leg ([address]     Left, right aka    CAD (coronary artery disease)     CAD (coronary artery disease)     Chronic back pain     Coughing     Depression     Diabetes     Diphtheria     Full dentures     GERD (gastroesophageal reflux disease) 10/10/2013    Glaucoma     Headache(784.0)     History of blood transfusion     Hx of blood clots     Hyperlipidemia     Hypertension     IDDM (insulin dependent diabetes mellitus)     Leg pain, bilateral     LONG TERM ANTICOAGULENT USE     Neuropathy     Numbness of toes     Obesity     Osteoarthritis     Peripheral vascular disease (HCC)     Rash, skin     lt arm    Type II or unspecified type diabetes mellitus without mention of complication, not stated as uncontrolled     Wears dentures     Wears glasses     Wears glasses         PSHx:   Past Surgical History:   Procedure Laterality Date    BYPASS GRAFT      CATARACT REMOVAL      bilateral    CORONARY ANGIOPLASTY      STENT X2    CORONARY ARTERY BYPASS GRAFT      LEG AMPUTATION THROUGH KNEE Left     TOTAL ABDOMINAL HYSTERECTOMY          Weight: 165 lbs  Height: 65    Comorbidities:   [x] Obesity [] Dialysis  [] Other:   [] Asthma/COPD [] Dementia [] Other:   [x] Stroke [] Sleep apnea [] Other:   [] Vascular disease [] Rheumatic disease [] Other:     Medications: [x] Refer to full medical record [] None [] Other:  Allergies:       [x] Refer to full medical record [] None [] Other:    Function:  Hand Dominance  [x] Right  [] Left    Home Environment: Apartment, one level, no elevator required, hospital bed. Has a slide board she uses occasionally. WORK STATUS : Retired     [facility]:  Hospital bed, wheelchair, slide board, shower chair, nursing aide 3x/week for 4 hours each day.     LEVEL OF FUNCTION   ADL/IADL Previous level of function Current level of function Who currently assists the patient with task/Comments   Bathing  [] Independent  [x] Deficit  [] Independent  [x] Deficit    Dress/grooming [x] Independent  [] deficit [x] Independent  [] Deficit    Transfers [x] Independent  [] deficit [x] Independent  [] deficit    Bed mobility [x] Independent  []Deficit [x] Independent  [] Deficit    Feeding [x] Independent  [] Deficit [x] Independent  [] deficit    Toileting [x] Independent  [] Deficit [x] Independent  [] Deficit    Driving [] Independent  [x] Deficit [] Independent  [x] Deficit    Housekeeping [] Independent  [x] Deficit [] Independent  [x] Deficit    Grocery shop/meal prep [] Independent  [x] Deficit [] Independent  [x] Deficit    Ambulation [] Independent  [x] Deficit [] Independent  [x] Deficit Wheelchair bound     OBJECTIVE:  Observations:   POSTURE No deficit Deficit Not Tested Comments   Kyphosis [] [x] []    Scoliosis [x] [] []    Forward Head [] [x] []    Rounded Shldrs [] [x] []    Slumped Sitting [] [x] []    Skin Integrity [x] [] [] Never had a wound   Pelvic alignment [] [x] [] Anterior tilted   Hip alignment [x] [] []           NEUROLOGICAL       Reflexes [] [] [x]    Sensation [] [x] [] Decreased B UE   Bladder/Bowel [x] [] []    Coordination [x] [] []    Fine motor coordination [x] [] []    Tone [x] [] []    Clonus [x] [] []    Vision  [] [x] [] Krzysztof Bolivar Roger at [employer] [x] [] []    Tremors [x] [] []        ROM  Left ROM  Right Strength  Left Strength  Right   Shoulder Flexion 140 120 4 4-   Shoulder Abduction  120  120 4- 4-   Shoulder ER Lateral head  Lateral head 4- 4-   Shoulder IR Lateral hip  Lateral hip 4- 4-   Elbow Flexion [facility]   WFL  4- 4-   Elbow Extension  [facility]   WFL  4- 4-   Pronation  WFL   WFL   4-  4-   Supination  WFL   WFL   4-  4-   Wrist Flexion  [facility]   WFL  4- 4-   Wrist Extension  [facility]   WFL  4- 4-     WFL  WFL    fair fair           ROM  Left ROM   Right Strength  Left Strength  Right   Hip Flexion 100 100 4- 4-   Hip Abduction   WFL   WFL 4- 4-   Hip Adduction   WFL   WFL 4- 4-   Hip Extension 0 0 4- 4-   Hip ER NT NT NT NT   Hip IR NT NT NT NT   Bilateral AKA     Spasticity/tone:  N/A     Sensory/Vision: Neuropathy in hands; vision - did not report any deficits, able to complete all tasks in clinic. FUNCTION Level of assistance Comments/observations   Bed Mobility     -rolling Did not assess Requires to be propped in bed due to breathing; did not assess in clinic   -sit to supine independent Observed in clinic   -supine to sit Mod assist Observed in clinic; at home has a hospital bed to complete independently   Transfers     -sit to stand N/A Bilateral AKA   -sliding board Transferred chair to mat No sliding board; able to transfer independently to/from table   -pivot N/A    Balance     -sitting static Independent     - sitting dynamic independent    -standing static N/A    - standing dynamic N/A    Ambulation N/A    W/C Mobility Independent    Atkins/Dress Independent      Assessment:    Completed assessment for custom Group 3 Power wheelchair to meet clients mobility needs. Patient has bilateral AKA and though she has prosthesis, due to poor fitting and increased age, does not choose to wear them at this time (and has not for years).   Patient has ischemic stroke in November 2021 with good recovery that Instruction in HEP        Frequency: 1 x/weeks for 1 visits    Todays Treatment:  Evaluation only for mobility needs         Summary of Equipment Recommendations    The following list of equipment is the most reasonable and cost effective alternatives to meet their needs:    Wheelchair:   Shun Montejo-- This narrow base of support Power Mobility Device will allow the patient to maneuver throughout her home  U-1 AGM Battery (Two) - Two batteries are required to ensure the patient is able to charge one battery while the other is in use, to prevent the patient from being stranded due to low battery  Armrest, Left, w/o Plastic -- Arm rest is required allow patient to self-adjust in sitting to prevent pressure sores from forming due to bilateral AKA. Armrest, Right, w/o Plastic -- Arm rest is required allow patient to self-adjust in sitting to prevent pressure sores from forming due to bilateral AKA. S/A Inline Joystick Accessory Assy, Right -- Patient is right handed and will maneuver power chair with right hand.     Length of Need: Lifetime        Evaluation Complexity:  History (Personal factors, comorbidities) [] 0 [] 1-2 [x] 3+   Exam (limitations, restrictions) [] 1-2 [] 3 [x] 4+   Clinical presentation (progression) [x] Stable [] Evolving  [] Unstable   Decision Making [x] Low [] Moderate [] High    [x] Low Complexity [] Moderate Complexity [] High Complexity       Treatment Charges: Mins Units   [x] Evaluation       [x]  Low       []  Moderate       []  High 23 1   []  Modalities     []  Ther Exercise     []  Manual Therapy     []  Ther Activities     []  Aquatics     []  Vasocompression     []  Other       TOTAL TREATMENT TIME: 23        (Billed timed treatment: 23)     Time In: 301p     Time Out: 324p     Electronically signed by:   Electronically signed by Leonardo Chapin PT on 9/25/2022 at 10:55 PM      Physician Signature:________________________________Date:__________________  By signing above or cosigning this note, I have reviewed this plan of care and certify a need for medically necessary rehabilitation services.      *PLEASE SIGN ABOVE AND FAX BACK ALL PAGES*

## 2023-10-23 ENCOUNTER — APPOINTMENT (OUTPATIENT)
Dept: CT IMAGING | Age: 83
End: 2023-10-23
Payer: MEDICARE

## 2023-10-23 ENCOUNTER — APPOINTMENT (OUTPATIENT)
Dept: GENERAL RADIOLOGY | Age: 83
End: 2023-10-23
Payer: MEDICARE

## 2023-10-23 ENCOUNTER — HOSPITAL ENCOUNTER (INPATIENT)
Age: 83
LOS: 4 days | Discharge: HOME HEALTH | End: 2023-10-28
Attending: EMERGENCY MEDICINE | Admitting: INTERNAL MEDICINE
Payer: MEDICARE

## 2023-10-23 DIAGNOSIS — I16.0 HYPERTENSIVE URGENCY: Primary | ICD-10-CM
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
## 2023-10-23 DIAGNOSIS — R07.89 OTHER CHEST PAIN: ICD-10-CM

## 2023-10-23 DIAGNOSIS — R79.89 ELEVATED TROPONIN: ICD-10-CM

## 2023-10-23 DIAGNOSIS — R73.9 HYPERGLYCEMIA: ICD-10-CM

## 2023-10-23 LAB
ALBUMIN SERPL-MCNC: 3 G/DL (ref 3.5–5.2)
ALP SERPL-CCNC: 113 U/L (ref 35–104)
ALT SERPL-CCNC: 7 U/L (ref 5–33)
ANION GAP SERPL CALCULATED.3IONS-SCNC: 7 MMOL/L (ref 9–17)
AST SERPL-CCNC: 17 U/L
BASOPHILS # BLD: 0.1 K/UL (ref 0–0.2)
BASOPHILS NFR BLD: 1 % (ref 0–2)
BILIRUB SERPL-MCNC: 0.3 MG/DL (ref 0.3–1.2)
BUN SERPL-MCNC: 13 MG/DL (ref 8–23)
CALCIUM SERPL-MCNC: 9.1 MG/DL (ref 8.6–10.4)
CHLORIDE SERPL-SCNC: 110 MMOL/L (ref 98–107)
CO2 SERPL-SCNC: 27 MMOL/L (ref 20–31)
CREAT SERPL-MCNC: 1.2 MG/DL (ref 0.5–0.9)
EOSINOPHIL # BLD: 0.2 K/UL (ref 0–0.4)
EOSINOPHILS RELATIVE PERCENT: 2 % (ref 0–4)
ERYTHROCYTE [DISTWIDTH] IN BLOOD BY AUTOMATED COUNT: 13.8 % (ref 11.5–14.9)
GFR SERPL CREATININE-BSD FRML MDRD: 45 ML/MIN/1.73M2
GLUCOSE BLD-MCNC: 15 MG/DL (ref 65–105)
GLUCOSE BLD-MCNC: 164 MG/DL (ref 65–105)
GLUCOSE BLD-MCNC: 203 MG/DL (ref 65–105)
GLUCOSE SERPL-MCNC: 145 MG/DL (ref 70–99)
HCT VFR BLD AUTO: 39.6 % (ref 36–46)
HGB BLD-MCNC: 13.1 G/DL (ref 12–16)
LYMPHOCYTES NFR BLD: 1.4 K/UL (ref 1–4.8)
LYMPHOCYTES RELATIVE PERCENT: 20 % (ref 24–44)
MAGNESIUM SERPL-MCNC: 2 MG/DL (ref 1.6–2.6)
MCH RBC QN AUTO: 29.5 PG (ref 26–34)
MCHC RBC AUTO-ENTMCNC: 33.2 G/DL (ref 31–37)
MCV RBC AUTO: 88.8 FL (ref 80–100)
MONOCYTES NFR BLD: 0.5 K/UL (ref 0.1–1.3)
MONOCYTES NFR BLD: 8 % (ref 1–7)
NEUTROPHILS NFR BLD: 69 % (ref 36–66)
NEUTS SEG NFR BLD: 4.8 K/UL (ref 1.3–9.1)
PLATELET # BLD AUTO: 194 K/UL (ref 150–450)
PMV BLD AUTO: 8.7 FL (ref 6–12)
POTASSIUM SERPL-SCNC: 3.4 MMOL/L (ref 3.7–5.3)
PROT SERPL-MCNC: 6.1 G/DL (ref 6.4–8.3)
RBC # BLD AUTO: 4.46 M/UL (ref 4–5.2)
SODIUM SERPL-SCNC: 144 MMOL/L (ref 135–144)
TROPONIN I SERPL HS-MCNC: 32 NG/L (ref 0–14)
WBC OTHER # BLD: 6.9 K/UL (ref 3.5–11)

## 2023-10-23 PROCEDURE — 73030 X-RAY EXAM OF SHOULDER: CPT

## 2023-10-23 PROCEDURE — 6360000002 HC RX W HCPCS

## 2023-10-23 PROCEDURE — 72125 CT NECK SPINE W/O DYE: CPT

## 2023-10-23 PROCEDURE — 99285 EMERGENCY DEPT VISIT HI MDM: CPT

## 2023-10-23 PROCEDURE — 93005 ELECTROCARDIOGRAM TRACING: CPT

## 2023-10-23 PROCEDURE — 70450 CT HEAD/BRAIN W/O DYE: CPT

## 2023-10-23 PROCEDURE — 6370000000 HC RX 637 (ALT 250 FOR IP)

## 2023-10-23 PROCEDURE — 82947 ASSAY GLUCOSE BLOOD QUANT: CPT

## 2023-10-23 PROCEDURE — 2500000003 HC RX 250 WO HCPCS: Performed by: EMERGENCY MEDICINE

## 2023-10-23 PROCEDURE — 96374 THER/PROPH/DIAG INJ IV PUSH: CPT

## 2023-10-23 PROCEDURE — 83735 ASSAY OF MAGNESIUM: CPT

## 2023-10-23 PROCEDURE — 36415 COLL VENOUS BLD VENIPUNCTURE: CPT

## 2023-10-23 PROCEDURE — 84484 ASSAY OF TROPONIN QUANT: CPT

## 2023-10-23 PROCEDURE — 71045 X-RAY EXAM CHEST 1 VIEW: CPT

## 2023-10-23 PROCEDURE — 85025 COMPLETE CBC W/AUTO DIFF WBC: CPT

## 2023-10-23 PROCEDURE — 96372 THER/PROPH/DIAG INJ SC/IM: CPT

## 2023-10-23 PROCEDURE — 80053 COMPREHEN METABOLIC PANEL: CPT

## 2023-10-23 RX ORDER — ACETAMINOPHEN 325 MG/1
650 TABLET ORAL ONCE
Status: COMPLETED | OUTPATIENT
Start: 2023-10-23 | End: 2023-10-23

## 2023-10-23 RX ORDER — CLONIDINE HYDROCHLORIDE 0.1 MG/1
0.2 TABLET ORAL ONCE
Status: COMPLETED | OUTPATIENT
Start: 2023-10-23 | End: 2023-10-23

## 2023-10-23 RX ORDER — DEXTROSE MONOHYDRATE 25 G/50ML
25 INJECTION, SOLUTION INTRAVENOUS ONCE
Status: COMPLETED | OUTPATIENT
Start: 2023-10-23 | End: 2023-10-23

## 2023-10-23 RX ADMIN — CLONIDINE HYDROCHLORIDE 0.2 MG: 0.1 TABLET ORAL at 23:45

## 2023-10-23 RX ADMIN — ACETAMINOPHEN 650 MG: 325 TABLET ORAL at 22:11

## 2023-10-23 RX ADMIN — DEXTROSE MONOHYDRATE 25 G: 25 INJECTION, SOLUTION INTRAVENOUS at 21:44

## 2023-10-23 RX ADMIN — GLUCAGON 1 MG: KIT at 21:36

## 2023-10-23 ASSESSMENT — PATIENT HEALTH QUESTIONNAIRE - PHQ9
SUM OF ALL RESPONSES TO PHQ QUESTIONS 1-9: 0
1. LITTLE INTEREST OR PLEASURE IN DOING THINGS: 0
SUM OF ALL RESPONSES TO PHQ9 QUESTIONS 1 & 2: 0
2. FEELING DOWN, DEPRESSED OR HOPELESS: 0

## 2023-10-23 ASSESSMENT — PAIN DESCRIPTION - LOCATION: LOCATION: CHEST

## 2023-10-23 ASSESSMENT — LIFESTYLE VARIABLES
HOW OFTEN DO YOU HAVE A DRINK CONTAINING ALCOHOL: NEVER
HOW MANY STANDARD DRINKS CONTAINING ALCOHOL DO YOU HAVE ON A TYPICAL DAY: PATIENT DOES NOT DRINK

## 2023-10-23 ASSESSMENT — PAIN SCALES - GENERAL: PAINLEVEL_OUTOF10: 2

## 2023-10-23 ASSESSMENT — PAIN - FUNCTIONAL ASSESSMENT: PAIN_FUNCTIONAL_ASSESSMENT: NONE - DENIES PAIN

## 2023-10-24 ENCOUNTER — APPOINTMENT (OUTPATIENT)
Dept: CT IMAGING | Age: 83
End: 2023-10-24
Payer: MEDICARE

## 2023-10-24 ENCOUNTER — APPOINTMENT (OUTPATIENT)
Age: 83
End: 2023-10-24
Payer: MEDICARE

## 2023-10-24 PROBLEM — I16.0 HYPERTENSIVE URGENCY: Status: ACTIVE | Noted: 2023-10-24

## 2023-10-24 PROBLEM — R79.89 ELEVATED TROPONIN: Status: ACTIVE | Noted: 2023-10-24

## 2023-10-24 PROBLEM — E16.2 HYPOGLYCEMIA: Status: ACTIVE | Noted: 2023-10-24

## 2023-10-24 LAB
CHOLEST SERPL-MCNC: 159 MG/DL
CHOLESTEROL/HDL RATIO: 3.5
EKG ATRIAL RATE: 59 BPM
EKG P AXIS: 28 DEGREES
EKG P-R INTERVAL: 222 MS
EKG Q-T INTERVAL: 438 MS
EKG QRS DURATION: 92 MS
EKG QTC CALCULATION (BAZETT): 433 MS
EKG R AXIS: -20 DEGREES
EKG T AXIS: -61 DEGREES
EKG VENTRICULAR RATE: 59 BPM
GLUCOSE BLD-MCNC: 104 MG/DL (ref 65–105)
GLUCOSE BLD-MCNC: 109 MG/DL (ref 65–105)
GLUCOSE BLD-MCNC: 128 MG/DL (ref 65–105)
GLUCOSE BLD-MCNC: 135 MG/DL (ref 65–105)
GLUCOSE BLD-MCNC: 157 MG/DL (ref 65–105)
GLUCOSE BLD-MCNC: 172 MG/DL (ref 65–105)
GLUCOSE BLD-MCNC: 177 MG/DL (ref 65–105)
HDLC SERPL-MCNC: 45 MG/DL
LDLC SERPL CALC-MCNC: 93 MG/DL (ref 0–130)
TRIGL SERPL-MCNC: 107 MG/DL
TROPONIN I SERPL HS-MCNC: 33 NG/L (ref 0–14)
TROPONIN I SERPL HS-MCNC: 34 NG/L (ref 0–14)
TROPONIN I SERPL HS-MCNC: 36 NG/L (ref 0–14)

## 2023-10-24 PROCEDURE — 6370000000 HC RX 637 (ALT 250 FOR IP): Performed by: EMERGENCY MEDICINE

## 2023-10-24 PROCEDURE — 6360000004 HC RX CONTRAST MEDICATION: Performed by: NURSE PRACTITIONER

## 2023-10-24 PROCEDURE — 6360000002 HC RX W HCPCS: Performed by: NURSE PRACTITIONER

## 2023-10-24 PROCEDURE — 70450 CT HEAD/BRAIN W/O DYE: CPT

## 2023-10-24 PROCEDURE — 2060000000 HC ICU INTERMEDIATE R&B

## 2023-10-24 PROCEDURE — 6370000000 HC RX 637 (ALT 250 FOR IP): Performed by: INTERNAL MEDICINE

## 2023-10-24 PROCEDURE — 96375 TX/PRO/DX INJ NEW DRUG ADDON: CPT

## 2023-10-24 PROCEDURE — 99221 1ST HOSP IP/OBS SF/LOW 40: CPT | Performed by: PSYCHIATRY & NEUROLOGY

## 2023-10-24 PROCEDURE — 2580000003 HC RX 258: Performed by: NURSE PRACTITIONER

## 2023-10-24 PROCEDURE — 93010 ELECTROCARDIOGRAM REPORT: CPT | Performed by: INTERNAL MEDICINE

## 2023-10-24 PROCEDURE — 84484 ASSAY OF TROPONIN QUANT: CPT

## 2023-10-24 PROCEDURE — 6360000004 HC RX CONTRAST MEDICATION: Performed by: PSYCHIATRY & NEUROLOGY

## 2023-10-24 PROCEDURE — C8929 TTE W OR WO FOL WCON,DOPPLER: HCPCS

## 2023-10-24 PROCEDURE — 93306 TTE W/DOPPLER COMPLETE: CPT | Performed by: INTERNAL MEDICINE

## 2023-10-24 PROCEDURE — 99223 1ST HOSP IP/OBS HIGH 75: CPT | Performed by: INTERNAL MEDICINE

## 2023-10-24 PROCEDURE — 6360000002 HC RX W HCPCS: Performed by: EMERGENCY MEDICINE

## 2023-10-24 PROCEDURE — 6370000000 HC RX 637 (ALT 250 FOR IP): Performed by: NURSE PRACTITIONER

## 2023-10-24 PROCEDURE — 2580000003 HC RX 258: Performed by: PSYCHIATRY & NEUROLOGY

## 2023-10-24 PROCEDURE — 80061 LIPID PANEL: CPT

## 2023-10-24 PROCEDURE — 70498 CT ANGIOGRAPHY NECK: CPT

## 2023-10-24 PROCEDURE — 6370000000 HC RX 637 (ALT 250 FOR IP): Performed by: SURGERY

## 2023-10-24 PROCEDURE — 36415 COLL VENOUS BLD VENIPUNCTURE: CPT

## 2023-10-24 RX ORDER — ONDANSETRON 2 MG/ML
4 INJECTION INTRAMUSCULAR; INTRAVENOUS EVERY 6 HOURS PRN
Status: DISCONTINUED | OUTPATIENT
Start: 2023-10-24 | End: 2023-10-28 | Stop reason: HOSPADM

## 2023-10-24 RX ORDER — ATROPINE SULFATE 0.1 MG/ML
0.5 INJECTION INTRAVENOUS EVERY 5 MIN PRN
Status: CANCELLED | OUTPATIENT
Start: 2023-10-24 | End: 2023-10-24

## 2023-10-24 RX ORDER — SODIUM CHLORIDE 0.9 % (FLUSH) 0.9 %
5-40 SYRINGE (ML) INJECTION PRN
Status: DISCONTINUED | OUTPATIENT
Start: 2023-10-24 | End: 2023-10-28 | Stop reason: HOSPADM

## 2023-10-24 RX ORDER — POTASSIUM CHLORIDE 20 MEQ/1
40 TABLET, EXTENDED RELEASE ORAL ONCE
Status: COMPLETED | OUTPATIENT
Start: 2023-10-24 | End: 2023-10-24

## 2023-10-24 RX ORDER — NITROGLYCERIN 0.4 MG/1
0.4 TABLET SUBLINGUAL EVERY 5 MIN PRN
Status: CANCELLED | OUTPATIENT
Start: 2023-10-24 | End: 2023-10-24

## 2023-10-24 RX ORDER — POTASSIUM CHLORIDE 20 MEQ/1
40 TABLET, EXTENDED RELEASE ORAL PRN
Status: DISCONTINUED | OUTPATIENT
Start: 2023-10-24 | End: 2023-10-28 | Stop reason: HOSPADM

## 2023-10-24 RX ORDER — METOPROLOL TARTRATE 5 MG/5ML
5 INJECTION INTRAVENOUS EVERY 5 MIN PRN
Status: CANCELLED | OUTPATIENT
Start: 2023-10-24 | End: 2023-10-24

## 2023-10-24 RX ORDER — POTASSIUM CHLORIDE 7.45 MG/ML
10 INJECTION INTRAVENOUS PRN
Status: DISCONTINUED | OUTPATIENT
Start: 2023-10-24 | End: 2023-10-28 | Stop reason: HOSPADM

## 2023-10-24 RX ORDER — INSULIN ASPART 100 [IU]/ML
30 INJECTION, SUSPENSION SUBCUTANEOUS 2 TIMES DAILY WITH MEALS
Status: DISCONTINUED | OUTPATIENT
Start: 2023-10-24 | End: 2023-10-28 | Stop reason: HOSPADM

## 2023-10-24 RX ORDER — PREGABALIN 100 MG/1
100 CAPSULE ORAL 2 TIMES DAILY
Status: DISCONTINUED | OUTPATIENT
Start: 2023-10-25 | End: 2023-10-28 | Stop reason: HOSPADM

## 2023-10-24 RX ORDER — SODIUM CHLORIDE 9 MG/ML
500 INJECTION, SOLUTION INTRAVENOUS CONTINUOUS PRN
Status: CANCELLED | OUTPATIENT
Start: 2023-10-24 | End: 2023-10-24

## 2023-10-24 RX ORDER — METOPROLOL SUCCINATE 100 MG/1
100 TABLET, EXTENDED RELEASE ORAL DAILY
Status: ON HOLD | COMMUNITY
Start: 2023-08-23 | End: 2023-10-28 | Stop reason: HOSPADM

## 2023-10-24 RX ORDER — ATORVASTATIN CALCIUM 40 MG/1
40 TABLET, FILM COATED ORAL NIGHTLY
Status: DISCONTINUED | OUTPATIENT
Start: 2023-10-24 | End: 2023-10-26

## 2023-10-24 RX ORDER — ACETAMINOPHEN 650 MG/1
650 SUPPOSITORY RECTAL EVERY 6 HOURS PRN
Status: DISCONTINUED | OUTPATIENT
Start: 2023-10-24 | End: 2023-10-28 | Stop reason: HOSPADM

## 2023-10-24 RX ORDER — HYDRALAZINE HYDROCHLORIDE 20 MG/ML
10 INJECTION INTRAMUSCULAR; INTRAVENOUS EVERY 6 HOURS PRN
Status: DISCONTINUED | OUTPATIENT
Start: 2023-10-24 | End: 2023-10-28 | Stop reason: HOSPADM

## 2023-10-24 RX ORDER — INSULIN ASPART 100 [IU]/ML
40 INJECTION, SUSPENSION SUBCUTANEOUS
Status: ON HOLD | COMMUNITY
End: 2023-10-28 | Stop reason: SDUPTHER

## 2023-10-24 RX ORDER — ALBUTEROL SULFATE 90 UG/1
2 AEROSOL, METERED RESPIRATORY (INHALATION) PRN
Status: CANCELLED | OUTPATIENT
Start: 2023-10-24 | End: 2023-10-24

## 2023-10-24 RX ORDER — SODIUM CHLORIDE 0.9 % (FLUSH) 0.9 %
10 SYRINGE (ML) INJECTION PRN
Status: DISCONTINUED | OUTPATIENT
Start: 2023-10-24 | End: 2023-10-28 | Stop reason: HOSPADM

## 2023-10-24 RX ORDER — HYDROCHLOROTHIAZIDE 25 MG/1
25 TABLET ORAL DAILY
Status: DISCONTINUED | OUTPATIENT
Start: 2023-10-24 | End: 2023-10-26

## 2023-10-24 RX ORDER — HYDRALAZINE HYDROCHLORIDE 25 MG/1
25 TABLET, FILM COATED ORAL EVERY 8 HOURS SCHEDULED
Status: DISCONTINUED | OUTPATIENT
Start: 2023-10-24 | End: 2023-10-27

## 2023-10-24 RX ORDER — POLYETHYLENE GLYCOL 3350 17 G/17G
17 POWDER, FOR SOLUTION ORAL DAILY PRN
Status: DISCONTINUED | OUTPATIENT
Start: 2023-10-24 | End: 2023-10-28 | Stop reason: HOSPADM

## 2023-10-24 RX ORDER — VALSARTAN 320 MG/1
160 TABLET ORAL DAILY
Status: DISCONTINUED | OUTPATIENT
Start: 2023-10-24 | End: 2023-10-26

## 2023-10-24 RX ORDER — 0.9 % SODIUM CHLORIDE 0.9 %
100 INTRAVENOUS SOLUTION INTRAVENOUS ONCE
Status: COMPLETED | OUTPATIENT
Start: 2023-10-24 | End: 2023-10-24

## 2023-10-24 RX ORDER — PREGABALIN 100 MG/1
100 CAPSULE ORAL 2 TIMES DAILY
COMMUNITY
Start: 2023-08-04

## 2023-10-24 RX ORDER — MORPHINE SULFATE 2 MG/ML
2 INJECTION, SOLUTION INTRAMUSCULAR; INTRAVENOUS ONCE
Status: COMPLETED | OUTPATIENT
Start: 2023-10-24 | End: 2023-10-24

## 2023-10-24 RX ORDER — AMLODIPINE BESYLATE 10 MG/1
10 TABLET ORAL DAILY
Status: DISCONTINUED | OUTPATIENT
Start: 2023-10-24 | End: 2023-10-28 | Stop reason: HOSPADM

## 2023-10-24 RX ORDER — REGADENOSON 0.08 MG/ML
0.4 INJECTION, SOLUTION INTRAVENOUS
Status: CANCELLED | OUTPATIENT
Start: 2023-10-24

## 2023-10-24 RX ORDER — ONDANSETRON 4 MG/1
4 TABLET, ORALLY DISINTEGRATING ORAL EVERY 8 HOURS PRN
Status: DISCONTINUED | OUTPATIENT
Start: 2023-10-24 | End: 2023-10-28 | Stop reason: HOSPADM

## 2023-10-24 RX ORDER — METOPROLOL SUCCINATE 100 MG/1
100 TABLET, EXTENDED RELEASE ORAL DAILY
Status: DISCONTINUED | OUTPATIENT
Start: 2023-10-24 | End: 2023-10-24

## 2023-10-24 RX ORDER — ASPIRIN 81 MG/1
81 TABLET ORAL DAILY
Status: DISCONTINUED | OUTPATIENT
Start: 2023-10-24 | End: 2023-10-28 | Stop reason: HOSPADM

## 2023-10-24 RX ORDER — CLOPIDOGREL BISULFATE 75 MG/1
75 TABLET ORAL DAILY
Status: DISCONTINUED | OUTPATIENT
Start: 2023-10-25 | End: 2023-10-28 | Stop reason: HOSPADM

## 2023-10-24 RX ORDER — DEXTROSE MONOHYDRATE 100 MG/ML
INJECTION, SOLUTION INTRAVENOUS CONTINUOUS PRN
Status: DISCONTINUED | OUTPATIENT
Start: 2023-10-24 | End: 2023-10-28 | Stop reason: HOSPADM

## 2023-10-24 RX ORDER — SODIUM CHLORIDE 0.9 % (FLUSH) 0.9 %
5-40 SYRINGE (ML) INJECTION PRN
Status: CANCELLED | OUTPATIENT
Start: 2023-10-24 | End: 2023-10-24

## 2023-10-24 RX ORDER — ENOXAPARIN SODIUM 100 MG/ML
40 INJECTION SUBCUTANEOUS DAILY
Status: DISCONTINUED | OUTPATIENT
Start: 2023-10-24 | End: 2023-10-26

## 2023-10-24 RX ORDER — SODIUM CHLORIDE 9 MG/ML
INJECTION, SOLUTION INTRAVENOUS PRN
Status: DISCONTINUED | OUTPATIENT
Start: 2023-10-24 | End: 2023-10-28 | Stop reason: HOSPADM

## 2023-10-24 RX ORDER — AMINOPHYLLINE 25 MG/ML
50 INJECTION, SOLUTION INTRAVENOUS PRN
Status: CANCELLED | OUTPATIENT
Start: 2023-10-24 | End: 2023-10-24

## 2023-10-24 RX ORDER — CARVEDILOL 3.12 MG/1
3.12 TABLET ORAL 2 TIMES DAILY WITH MEALS
Status: DISCONTINUED | OUTPATIENT
Start: 2023-10-24 | End: 2023-10-26

## 2023-10-24 RX ORDER — CLOPIDOGREL BISULFATE 75 MG/1
300 TABLET ORAL ONCE
Status: COMPLETED | OUTPATIENT
Start: 2023-10-24 | End: 2023-10-24

## 2023-10-24 RX ORDER — SODIUM CHLORIDE 0.9 % (FLUSH) 0.9 %
5-40 SYRINGE (ML) INJECTION EVERY 12 HOURS SCHEDULED
Status: DISCONTINUED | OUTPATIENT
Start: 2023-10-24 | End: 2023-10-28 | Stop reason: HOSPADM

## 2023-10-24 RX ORDER — ACETAMINOPHEN 325 MG/1
650 TABLET ORAL EVERY 6 HOURS PRN
Status: DISCONTINUED | OUTPATIENT
Start: 2023-10-24 | End: 2023-10-28 | Stop reason: HOSPADM

## 2023-10-24 RX ADMIN — IOPAMIDOL 75 ML: 755 INJECTION, SOLUTION INTRAVENOUS at 10:06

## 2023-10-24 RX ADMIN — POTASSIUM CHLORIDE 40 MEQ: 1500 TABLET, EXTENDED RELEASE ORAL at 01:35

## 2023-10-24 RX ADMIN — HYDROCHLOROTHIAZIDE 25 MG: 25 TABLET ORAL at 08:45

## 2023-10-24 RX ADMIN — HYDRALAZINE HYDROCHLORIDE 10 MG: 20 INJECTION, SOLUTION INTRAMUSCULAR; INTRAVENOUS at 08:45

## 2023-10-24 RX ADMIN — SODIUM CHLORIDE, PRESERVATIVE FREE 10 ML: 5 INJECTION INTRAVENOUS at 22:39

## 2023-10-24 RX ADMIN — ACETAMINOPHEN 650 MG: 325 TABLET ORAL at 14:40

## 2023-10-24 RX ADMIN — SODIUM CHLORIDE, PRESERVATIVE FREE 10 ML: 5 INJECTION INTRAVENOUS at 10:06

## 2023-10-24 RX ADMIN — MORPHINE SULFATE 2 MG: 2 INJECTION, SOLUTION INTRAMUSCULAR; INTRAVENOUS at 01:35

## 2023-10-24 RX ADMIN — ATORVASTATIN CALCIUM 40 MG: 40 TABLET, FILM COATED ORAL at 21:32

## 2023-10-24 RX ADMIN — SODIUM CHLORIDE, PRESERVATIVE FREE 10 ML: 5 INJECTION INTRAVENOUS at 08:46

## 2023-10-24 RX ADMIN — AMLODIPINE BESYLATE 10 MG: 10 TABLET ORAL at 08:45

## 2023-10-24 RX ADMIN — HYDRALAZINE HYDROCHLORIDE 25 MG: 25 TABLET, FILM COATED ORAL at 21:32

## 2023-10-24 RX ADMIN — ASPIRIN 81 MG: 81 TABLET, COATED ORAL at 08:45

## 2023-10-24 RX ADMIN — SODIUM CHLORIDE 100 ML: 9 INJECTION, SOLUTION INTRAVENOUS at 10:06

## 2023-10-24 RX ADMIN — PERFLUTREN 2 ML: 6.52 INJECTION, SUSPENSION INTRAVENOUS at 11:13

## 2023-10-24 RX ADMIN — HYDRALAZINE HYDROCHLORIDE 10 MG: 20 INJECTION, SOLUTION INTRAMUSCULAR; INTRAVENOUS at 14:41

## 2023-10-24 RX ADMIN — CLOPIDOGREL BISULFATE 300 MG: 75 TABLET ORAL at 17:13

## 2023-10-24 ASSESSMENT — PAIN DESCRIPTION - DESCRIPTORS
DESCRIPTORS: SORE
DESCRIPTORS: ACHING

## 2023-10-24 ASSESSMENT — PAIN SCALES - GENERAL
PAINLEVEL_OUTOF10: 10
PAINLEVEL_OUTOF10: 5
PAINLEVEL_OUTOF10: 0

## 2023-10-24 ASSESSMENT — PAIN DESCRIPTION - ORIENTATION: ORIENTATION: MID

## 2023-10-24 ASSESSMENT — PAIN DESCRIPTION - LOCATION
LOCATION: CHEST
LOCATION: HEAD

## 2023-10-24 NOTE — PROGRESS NOTES
Per primary RN, pt was admitted to unit at [address] today; assessment complete at that time. Pt called out at approximately 0900 c/o left sided \"heaviness\". NIHSS 5 for left arm drift but does not hit bed, mild dysarthria, mild left facial droop.

## 2023-10-24 NOTE — H&P
[facility] Internal Medicine  Kiera Lopes MD; Galo Gonzales MD; Jeanna Mccollum MD; MD Romina Lopez MD; MD A[facility]LAN [facility]   [address]    HISTORY AND PHYSICAL EXAMINATION            Date:   10/24/2023  Patient name:  Rinku De Leon  Date of admission:  10/23/2023  7:59 PM  MRN:   [id number]  Account:  [de-identified]  YOB: 1940  PCP:    No primary care provider on file. Room:   [address] [place] [address]  Code Status:    Full Code    Chief Complaint:     Chief Complaint   Patient presents with    Fall    Dizziness       History Obtained From:     Patient medical record nursing staff    History of Present Illness:     Rinku De Leon is a 82 y.o. Non- / non  female who presents with Fall and Dizziness   and is admitted to the hospital for the management of Hypoglycemia. Rinku De Leon is a 82 y.o. Non- / non  female who presents with Fall and Dizziness and is admitted to the hospital for the management of Hypoglycemia. Medical history significant for CAD, HTN, HLD, PVD, bilateral leg amputation, DM type II, glaucoma. Presented to ED after fall from wheelchair, reporting dizziness. Denies loss of consciousness. CT head and neck negative for acute abnormality. Admits to shortness of breath but denies chest pain. Troponin elevated at 32 with repeat 33. Bradycardia in 50s which appears to be patient's baseline. Also found to be hypoglycemic with blood sugar of 15. She is on home insulin, lives alone and has poor eyesight from glaucoma.   Also significantly hypertensive with SBP in 200s initially     Plan to admit to progressive unit for hypoglycemia and elevated troponin with, cardiology consult and 2D echo    Past Medical History:     Past Medical History:   Diagnosis Date    Amputation of leg ([address])     left aka    CAD (coronary artery disease)     CAD (coronary artery

## 2023-10-24 NOTE — PROGRESS NOTES
Stroke Alert called 8:40 a.m, Patient c/o left arm weakness and slurred speech with mild left face droop. Patient transferred for CT scan. Patient requested that her son be called on her way to elevator. Patient's son Darby Sanford was called to informed him of Stroke Alert and that stroke assessment was being completed. Writer was able to assure son that his Mother was resting comfortable. Writer was able to visit with with patient and patient was open to prayer. Spiritual care team will remain available as needed. 10/24/23 1000   Encounter Summary   Encounter Overview/Reason  Crisis   Service Provided For: Patient   Referral/Consult From: Multi-disciplinary team   Support System Children;Family members; Protestant/max community   Last Encounter  10/24/23   Complexity of Encounter High   Begin Time 0902   End Time  0930   Total Time Calculated 28 min   Spiritual/Emotional needs   Type Spiritual Support   Assessment/Intervention/Outcome   Assessment Powerlessness   Intervention Active listening;Prayer (assurance of)/[place];Nurtured Hope;Sustaining Presence/Ministry of presence   Outcome Comfort

## 2023-10-24 NOTE — PLAN OF CARE
Please have patient or POA answer all MRI screening form questions in Epic. Exam will be scheduled after form has been completed and reviewed. Thank you.

## 2023-10-24 NOTE — PROGRESS NOTES
Perfect Serve sent to Dr. Nathaniel Hoffman for vascular surgery consult. 26- Dr. Nathaniel Hoffman stated that this consult needs to be for neuro-interventional, not vascular surgery. Dr. May Said notified via [email or web address].

## 2023-10-24 NOTE — PROGRESS NOTES
[address] [facility] Internal Medicine  Justus Esqueda MD; Arnulfo Gottlieb MD; Hany Harris MD; MD Luis Eduardo Olson MD; MD ARSLAN Strong [facility] Internal Medicine   [address]                 Date:   10/24/2023  Patientname:  Omkar Wheeler  Date of admission:  10/23/2023  7:59 PM  MRN:   [id number]  Account:  [de-identified]  YOB: 1940  PCP:    No primary care provider on file. Room:   NOÉ/NOÉ  Code Status:    Full code      Chief Complaint:     Chief Complaint   Patient presents with    Fall    Dizziness       History of Present Illness:     Omkar Wheeler is a 82 y.o. Non- / non  female who presents with Fall and Dizziness and is admitted to the hospital for the management of Hypoglycemia. Medical history significant for CAD, HTN, HLD, PVD, bilateral leg amputation, DM type II, glaucoma. Presented to ED after fall from wheelchair, reporting dizziness. Denies loss of consciousness. CT head and neck negative for acute abnormality. Admits to shortness of breath but denies chest pain. Troponin elevated at 32 with repeat 33. Bradycardia in 50s which appears to be patient's baseline. Also found to be hypoglycemic with blood sugar of 15. She is on home insulin, lives alone and has poor eyesight from glaucoma. Also significantly hypertensive with SBP in 200s initially    Plan to admit to progressive unit for hypoglycemia and elevated troponin with, cardiology consult and 2D echo. MIRA Stanley - NP  10/24/2023  7:42 AM      Please note that this chart was generated using voice recognition Dragon dictation software. Although every effort was made to ensure the accuracy of this automated transcription, some errors in transcription may have occurred. [address]  [address] [place], [address]    Phone [phone]

## 2023-10-24 NOTE — ED NOTES
Patient out of the room for CT.       Omega Valdez RN  10/23/23 2048       Omega Valdez RN  10/23/23 2100

## 2023-10-24 NOTE — PROGRESS NOTES
Pt back to room 2117 s/p CTA head WO contrast and CTA head/neck; wheels locked, bed in lowest position. No change in patient condition from previous assessment. Stroke bot at bedside.

## 2023-10-24 NOTE — ED PROVIDER NOTES
[address] [facility] ED  Emergency Department Encounter  Emergency Medicine Resident     Pt Name:Juliet Molina  MRN: [id number]  [address] [facility] 1940  Date of evaluation: 10/23/23  PCP:  No primary care provider on file. Note Started: 8:01 PM EDT      CHIEF COMPLAINT       Chief Complaint   Patient presents with    Fall    Dizziness       HISTORY OF PRESENT ILLNESS  (Location/Symptom, Timing/Onset, Context/Setting, Quality, Duration, Modifying Factors, Severity.)      Che Raygoza is a 82 y.o. female who presents with mechanical fall at home today. Patient was in a wheelchair, thought it was locked, reaching for something on the kitchen cabinet in wheelchair slipped out from behind her, causing her to fall on her bottom and then struck the back of her head. Reports mild left lateral neck pain but nothing midline. Also having right shoulder pain. Denies any loss of consciousness. Does take aspirin and Plavix for cardiac stents and has history of TIA. Says she has pain the back of her head and feels little lightheaded but denies any sensation of room spinning. Does feel little short of breath but denies chest pain. Also reporting some urination for the past 10 days. Reporting morning headaches behind her eyes for the past 3 days. Patient says she is not seen a doctor long time. Recent echo in 2021 showed EF of 55 to 60%  Stress ECG in 2021 was negative.     PAST MEDICAL / SURGICAL / SOCIAL / FAMILY HISTORY      has a past medical history of Amputation of leg ([address]), CAD (coronary artery disease), CAD (coronary artery disease), Chronic back pain, Coughing, Depression, Diabetes, Diphtheria, Full dentures, GERD (gastroesophageal reflux disease), Glaucoma, Headache(784.0), History of blood transfusion, Hx of blood clots, Hyperlipidemia, Hypertension, IDDM (insulin dependent diabetes mellitus), Leg pain, bilateral, LONG TERM ANTICOAGULENT USE, Neuropathy, Numbness of toes, Obesity, Osteoarthritis, Peripheral IV    CONSULTS:  None    FINAL IMPRESSION      1. Fall, initial encounter    2. Hyperglycemia    3. Hypertensive urgency    4. Other chest pain          DISPOSITION / PLAN     DISPOSITION Decision To Admit 10/24/2023 12:50:56 AM      PATIENT REFERRED TO:  No follow-up provider specified.     DISCHARGE MEDICATIONS:  New Prescriptions    No medications on file       Miguel Kurtz MD  Emergency Medicine Resident    (Please note that portions of this note were completed with a voice recognition program.  Efforts were made to edit the dictations but occasionally words are mis-transcribed.)       Miguel Kurtz MD  Resident  10/24/23 2480

## 2023-10-24 NOTE — PROGRESS NOTES
Assessment completed with telestroke physician. Left arm weakness and facial droop have resolved. Pt states that speech \"feels weird\". No other deficits noted; NIHSS now 0 (pt's speech is subjectively \"weird\" but no deficit noted on exam). Pt not a candidate for thrombolytics per telestroke physician. Plan for MRI brain per neurologist Dr. Lynsey Schwartz

## 2023-10-24 NOTE — PROGRESS NOTES
Patient seen and examined status post stroke alert CTA head neck negative examination no weakness noted on the left side history of bilateral above-knee amputation normal speech Case discussed with neuro intervention Dr. Servando Cody MD  10/24/2023

## 2023-10-24 NOTE — PROGRESS NOTES
Will consult neuro to eval for any further neur intervention recommendation  Consult placed  Yessy Dc MD  10/24/2023

## 2023-10-25 ENCOUNTER — HOSPITAL ENCOUNTER (INPATIENT)
Age: 83
Discharge: HOME | End: 2023-10-27
Payer: MEDICARE

## 2023-10-25 ENCOUNTER — APPOINTMENT (OUTPATIENT)
Dept: NUCLEAR MEDICINE | Age: 83
End: 2023-10-25
Payer: MEDICARE

## 2023-10-25 ENCOUNTER — APPOINTMENT (OUTPATIENT)
Dept: MRI IMAGING | Age: 83
End: 2023-10-25
Payer: MEDICARE

## 2023-10-25 LAB
ANION GAP SERPL CALCULATED.3IONS-SCNC: 9 MMOL/L (ref 9–17)
BASOPHILS # BLD: 0 K/UL (ref 0–0.2)
BASOPHILS NFR BLD: 0 % (ref 0–2)
BUN SERPL-MCNC: 19 MG/DL (ref 8–23)
CALCIUM SERPL-MCNC: 8.7 MG/DL (ref 8.6–10.4)
CHLORIDE SERPL-SCNC: 111 MMOL/L (ref 98–107)
CO2 SERPL-SCNC: 25 MMOL/L (ref 20–31)
CREAT SERPL-MCNC: 1.2 MG/DL (ref 0.5–0.9)
ECHO AO ROOT DIAM: 2.5 CM
ECHO AO ROOT INDEX: 1.55 CM/M2
ECHO AV AREA PEAK VELOCITY: 2 CM2
ECHO AV AREA VTI: 2.1 CM2
ECHO AV AREA/BSA PEAK VELOCITY: 1.2 CM2/M2
ECHO AV AREA/BSA VTI: 1.3 CM2/M2
ECHO AV MEAN GRADIENT: 5 MMHG
ECHO AV MEAN VELOCITY: 1 M/S
ECHO AV PEAK GRADIENT: 9 MMHG
ECHO AV PEAK VELOCITY: 1.5 M/S
ECHO AV VELOCITY RATIO: 0.73
ECHO AV VTI: 38.3 CM
ECHO BSA: 1.7 M2
ECHO BSA: 1.7 M2
ECHO EST RA PRESSURE: 15 MMHG
ECHO LA AREA 2C: 17.1 CM2
ECHO LA AREA 4C: 11.4 CM2
ECHO LA DIAMETER INDEX: 2.55 CM/M2
ECHO LA DIAMETER: 4.1 CM
ECHO LA MAJOR AXIS: 4.7 CM
ECHO LA MINOR AXIS: 5.7 CM
ECHO LA TO AORTIC ROOT RATIO: 1.64
ECHO LA VOL 2C: 44 ML (ref 22–52)
ECHO LA VOL 4C: 22 ML (ref 22–52)
ECHO LA VOL BP: 34 ML (ref 22–52)
ECHO LA VOL/BSA BIPLANE: 21 ML/M2 (ref 16–34)
ECHO LA VOLUME INDEX A2C: 27 ML/M2 (ref 16–34)
ECHO LA VOLUME INDEX A4C: 14 ML/M2 (ref 16–34)
ECHO LV E' LATERAL VELOCITY: 9 CM/S
ECHO LV E' SEPTAL VELOCITY: 3 CM/S
ECHO LV FRACTIONAL SHORTENING: 42 % (ref 28–44)
ECHO LV INTERNAL DIMENSION DIASTOLE INDEX: 2.24 CM/M2
ECHO LV INTERNAL DIMENSION DIASTOLIC: 3.6 CM (ref 3.9–5.3)
ECHO LV INTERNAL DIMENSION SYSTOLIC INDEX: 1.3 CM/M2
ECHO LV INTERNAL DIMENSION SYSTOLIC: 2.1 CM
ECHO LV IVSD: 1.7 CM (ref 0.6–0.9)
ECHO LV MASS 2D: 247.2 G (ref 67–162)
ECHO LV MASS INDEX 2D: 153.5 G/M2 (ref 43–95)
ECHO LV POSTERIOR WALL DIASTOLIC: 1.7 CM (ref 0.6–0.9)
ECHO LV RELATIVE WALL THICKNESS RATIO: 0.94
ECHO LVOT AREA: 2.5 CM2
ECHO LVOT AV VTI INDEX: 0.81
ECHO LVOT DIAM: 1.8 CM
ECHO LVOT MEAN GRADIENT: 3 MMHG
ECHO LVOT PEAK GRADIENT: 5 MMHG
ECHO LVOT PEAK VELOCITY: 1.1 M/S
ECHO LVOT STROKE VOLUME INDEX: 49 ML/M2
ECHO LVOT SV: 78.8 ML
ECHO LVOT VTI: 31 CM
ECHO MV A VELOCITY: 1.69 M/S
ECHO MV AREA VTI: 1.4 CM2
ECHO MV E DECELERATION TIME (DT): 627 MS
ECHO MV E VELOCITY: 0.95 M/S
ECHO MV E/A RATIO: 0.56
ECHO MV E/E' LATERAL: 10.56
ECHO MV E/E' RATIO (AVERAGED): 21.11
ECHO MV E/E' SEPTAL: 31.67
ECHO MV LVOT VTI INDEX: 1.79
ECHO MV MAX VELOCITY: 1.7 M/S
ECHO MV MEAN GRADIENT: 4 MMHG
ECHO MV MEAN VELOCITY: 0.9 M/S
ECHO MV PEAK GRADIENT: 12 MMHG
ECHO MV VTI: 55.6 CM
ECHO PV MAX VELOCITY: 1.4 M/S
ECHO PV MEAN GRADIENT: 3 MMHG
ECHO PV MEAN VELOCITY: 0.9 M/S
ECHO PV PEAK GRADIENT: 7 MMHG
ECHO PV VTI: 30.3 CM
ECHO RA AREA 4C: 12 CM2
ECHO RA END SYSTOLIC VOLUME APICAL 4 CHAMBER INDEX BSA: 16 ML/M2
ECHO RA VOLUME: 26 ML
ECHO RIGHT VENTRICULAR SYSTOLIC PRESSURE (RVSP): 31 MMHG
ECHO RV TAPSE: 1.9 CM (ref 1.7–?)
ECHO TV REGURGITANT MAX VELOCITY: 2.01 M/S
ECHO TV REGURGITANT PEAK GRADIENT: 16 MMHG
EOSINOPHIL # BLD: 0.1 K/UL (ref 0–0.4)
EOSINOPHILS RELATIVE PERCENT: 2 % (ref 0–4)
ERYTHROCYTE [DISTWIDTH] IN BLOOD BY AUTOMATED COUNT: 13.9 % (ref 11.5–14.9)
EST. AVERAGE GLUCOSE BLD GHB EST-MCNC: 146 MG/DL
GFR SERPL CREATININE-BSD FRML MDRD: 45 ML/MIN/1.73M2
GLUCOSE BLD-MCNC: 185 MG/DL (ref 65–105)
GLUCOSE BLD-MCNC: 259 MG/DL (ref 65–105)
GLUCOSE BLD-MCNC: 280 MG/DL (ref 65–105)
GLUCOSE SERPL-MCNC: 171 MG/DL (ref 70–99)
HBA1C MFR BLD: 6.7 % (ref 4–6)
HCT VFR BLD AUTO: 35.4 % (ref 36–46)
HGB BLD-MCNC: 11.4 G/DL (ref 12–16)
LYMPHOCYTES NFR BLD: 1.9 K/UL (ref 1–4.8)
LYMPHOCYTES RELATIVE PERCENT: 30 % (ref 24–44)
MCH RBC QN AUTO: 28.7 PG (ref 26–34)
MCHC RBC AUTO-ENTMCNC: 32.2 G/DL (ref 31–37)
MCV RBC AUTO: 89.2 FL (ref 80–100)
MONOCYTES NFR BLD: 0.6 K/UL (ref 0.1–1.3)
MONOCYTES NFR BLD: 10 % (ref 1–7)
NEUTROPHILS NFR BLD: 58 % (ref 36–66)
NEUTS SEG NFR BLD: 3.6 K/UL (ref 1.3–9.1)
PLATELET # BLD AUTO: 213 K/UL (ref 150–450)
PMV BLD AUTO: 8.9 FL (ref 6–12)
POTASSIUM SERPL-SCNC: 3.8 MMOL/L (ref 3.7–5.3)
RBC # BLD AUTO: 3.97 M/UL (ref 4–5.2)
SODIUM SERPL-SCNC: 145 MMOL/L (ref 135–144)
STRESS BASELINE DIAS BP: 57 MMHG
STRESS BASELINE HR: 63 BPM
STRESS BASELINE SYS BP: 178 MMHG
STRESS ESTIMATED WORKLOAD: 1 METS
STRESS PEAK DIAS BP: 51 MMHG
STRESS PEAK SYS BP: 147 MMHG
STRESS PERCENT HR ACHIEVED: 59 %
STRESS POST PEAK HR: 82 BPM
STRESS RATE PRESSURE PRODUCT: NORMAL BPM*MMHG
STRESS STAGE RECOVERY 1 BP: NORMAL MMHG
STRESS STAGE RECOVERY 1 DURATION: 1 MIN:SEC
STRESS STAGE RECOVERY 1 HR: 78 BPM
STRESS STAGE RECOVERY 2 BP: NORMAL MMHG
STRESS STAGE RECOVERY 2 DURATION: 7 MIN:SEC
STRESS STAGE RECOVERY 2 HR: 73 BPM
STRESS TARGET HR: 139 BPM
TID: 0.91
WBC OTHER # BLD: 6.3 K/UL (ref 3.5–11)

## 2023-10-25 PROCEDURE — 6370000000 HC RX 637 (ALT 250 FOR IP): Performed by: SURGERY

## 2023-10-25 PROCEDURE — 2580000003 HC RX 258: Performed by: NURSE PRACTITIONER

## 2023-10-25 PROCEDURE — 2580000003 HC RX 258: Performed by: PSYCHIATRY & NEUROLOGY

## 2023-10-25 PROCEDURE — 6370000000 HC RX 637 (ALT 250 FOR IP): Performed by: NURSE PRACTITIONER

## 2023-10-25 PROCEDURE — 36415 COLL VENOUS BLD VENIPUNCTURE: CPT

## 2023-10-25 PROCEDURE — 3430000000 HC RX DIAGNOSTIC RADIOPHARMACEUTICAL: Performed by: SURGERY

## 2023-10-25 PROCEDURE — 70551 MRI BRAIN STEM W/O DYE: CPT

## 2023-10-25 PROCEDURE — 6370000000 HC RX 637 (ALT 250 FOR IP): Performed by: INTERNAL MEDICINE

## 2023-10-25 PROCEDURE — 83036 HEMOGLOBIN GLYCOSYLATED A1C: CPT

## 2023-10-25 PROCEDURE — 78452 HT MUSCLE IMAGE SPECT MULT: CPT

## 2023-10-25 PROCEDURE — 97166 OT EVAL MOD COMPLEX 45 MIN: CPT

## 2023-10-25 PROCEDURE — 99232 SBSQ HOSP IP/OBS MODERATE 35: CPT | Performed by: INTERNAL MEDICINE

## 2023-10-25 PROCEDURE — 80048 BASIC METABOLIC PNL TOTAL CA: CPT

## 2023-10-25 PROCEDURE — 85025 COMPLETE CBC W/AUTO DIFF WBC: CPT

## 2023-10-25 PROCEDURE — 97530 THERAPEUTIC ACTIVITIES: CPT

## 2023-10-25 PROCEDURE — 93017 CV STRESS TEST TRACING ONLY: CPT

## 2023-10-25 PROCEDURE — 2580000003 HC RX 258: Performed by: SURGERY

## 2023-10-25 PROCEDURE — 99233 SBSQ HOSP IP/OBS HIGH 50: CPT | Performed by: NURSE PRACTITIONER

## 2023-10-25 PROCEDURE — 6360000002 HC RX W HCPCS: Performed by: NURSE PRACTITIONER

## 2023-10-25 PROCEDURE — 6360000002 HC RX W HCPCS: Performed by: SURGERY

## 2023-10-25 PROCEDURE — 2060000000 HC ICU INTERMEDIATE R&B

## 2023-10-25 PROCEDURE — 99222 1ST HOSP IP/OBS MODERATE 55: CPT | Performed by: PSYCHIATRY & NEUROLOGY

## 2023-10-25 PROCEDURE — A9500 TC99M SESTAMIBI: HCPCS | Performed by: SURGERY

## 2023-10-25 PROCEDURE — 97162 PT EVAL MOD COMPLEX 30 MIN: CPT

## 2023-10-25 PROCEDURE — 82947 ASSAY GLUCOSE BLOOD QUANT: CPT

## 2023-10-25 RX ORDER — TETRAKIS(2-METHOXYISOBUTYLISOCYANIDE)COPPER(I) TETRAFLUOROBORATE 1 MG/ML
15 INJECTION, POWDER, LYOPHILIZED, FOR SOLUTION INTRAVENOUS
Status: COMPLETED | OUTPATIENT
Start: 2023-10-25 | End: 2023-10-25

## 2023-10-25 RX ORDER — TETRAKIS(2-METHOXYISOBUTYLISOCYANIDE)COPPER(I) TETRAFLUOROBORATE 1 MG/ML
35 INJECTION, POWDER, LYOPHILIZED, FOR SOLUTION INTRAVENOUS
Status: COMPLETED | OUTPATIENT
Start: 2023-10-25 | End: 2023-10-25

## 2023-10-25 RX ORDER — SODIUM CHLORIDE 0.9 % (FLUSH) 0.9 %
5-40 SYRINGE (ML) INJECTION PRN
Status: ACTIVE | OUTPATIENT
Start: 2023-10-25 | End: 2023-10-25

## 2023-10-25 RX ORDER — SODIUM CHLORIDE 9 MG/ML
500 INJECTION, SOLUTION INTRAVENOUS CONTINUOUS PRN
Status: ACTIVE | OUTPATIENT
Start: 2023-10-25 | End: 2023-10-25

## 2023-10-25 RX ORDER — ALBUTEROL SULFATE 90 UG/1
2 AEROSOL, METERED RESPIRATORY (INHALATION) PRN
Status: ACTIVE | OUTPATIENT
Start: 2023-10-25 | End: 2023-10-25

## 2023-10-25 RX ORDER — AMINOPHYLLINE 25 MG/ML
50 INJECTION, SOLUTION INTRAVENOUS PRN
Status: ACTIVE | OUTPATIENT
Start: 2023-10-25 | End: 2023-10-25

## 2023-10-25 RX ORDER — GUAIFENESIN 600 MG/1
600 TABLET, EXTENDED RELEASE ORAL 2 TIMES DAILY
Status: DISCONTINUED | OUTPATIENT
Start: 2023-10-25 | End: 2023-10-28 | Stop reason: HOSPADM

## 2023-10-25 RX ORDER — ATROPINE SULFATE 0.1 MG/ML
0.5 INJECTION INTRAVENOUS EVERY 5 MIN PRN
Status: ACTIVE | OUTPATIENT
Start: 2023-10-25 | End: 2023-10-25

## 2023-10-25 RX ORDER — NITROGLYCERIN 0.4 MG/1
0.4 TABLET SUBLINGUAL EVERY 5 MIN PRN
Status: ACTIVE | OUTPATIENT
Start: 2023-10-25 | End: 2023-10-25

## 2023-10-25 RX ORDER — REGADENOSON 0.08 MG/ML
0.4 INJECTION, SOLUTION INTRAVENOUS
Status: COMPLETED | OUTPATIENT
Start: 2023-10-25 | End: 2023-10-25

## 2023-10-25 RX ORDER — METOPROLOL TARTRATE 5 MG/5ML
5 INJECTION INTRAVENOUS EVERY 5 MIN PRN
Status: ACTIVE | OUTPATIENT
Start: 2023-10-25 | End: 2023-10-25

## 2023-10-25 RX ORDER — SODIUM CHLORIDE 0.9 % (FLUSH) 0.9 %
10 SYRINGE (ML) INJECTION PRN
Status: DISCONTINUED | OUTPATIENT
Start: 2023-10-25 | End: 2023-10-28 | Stop reason: HOSPADM

## 2023-10-25 RX ADMIN — ATORVASTATIN CALCIUM 40 MG: 40 TABLET, FILM COATED ORAL at 20:56

## 2023-10-25 RX ADMIN — ENOXAPARIN SODIUM 40 MG: 100 INJECTION SUBCUTANEOUS at 11:53

## 2023-10-25 RX ADMIN — GUAIFENESIN 600 MG: 600 TABLET, EXTENDED RELEASE ORAL at 15:46

## 2023-10-25 RX ADMIN — Medication 37.3 MILLICURIE: at 12:42

## 2023-10-25 RX ADMIN — VALSARTAN 160 MG: 320 TABLET ORAL at 11:50

## 2023-10-25 RX ADMIN — AMLODIPINE BESYLATE 10 MG: 10 TABLET ORAL at 11:09

## 2023-10-25 RX ADMIN — PREGABALIN 100 MG: 100 CAPSULE ORAL at 11:50

## 2023-10-25 RX ADMIN — HYDROCHLOROTHIAZIDE 25 MG: 25 TABLET ORAL at 11:58

## 2023-10-25 RX ADMIN — GUAIFENESIN 600 MG: 600 TABLET, EXTENDED RELEASE ORAL at 20:56

## 2023-10-25 RX ADMIN — PREGABALIN 100 MG: 100 CAPSULE ORAL at 20:56

## 2023-10-25 RX ADMIN — HYDRALAZINE HYDROCHLORIDE 25 MG: 25 TABLET, FILM COATED ORAL at 15:46

## 2023-10-25 RX ADMIN — SODIUM CHLORIDE, PRESERVATIVE FREE 10 ML: 5 INJECTION INTRAVENOUS at 12:42

## 2023-10-25 RX ADMIN — Medication 17.8 MILLICURIE: at 09:47

## 2023-10-25 RX ADMIN — SODIUM CHLORIDE, PRESERVATIVE FREE 10 ML: 5 INJECTION INTRAVENOUS at 11:59

## 2023-10-25 RX ADMIN — SODIUM CHLORIDE, PRESERVATIVE FREE 10 ML: 5 INJECTION INTRAVENOUS at 11:11

## 2023-10-25 RX ADMIN — CARVEDILOL 3.12 MG: 3.12 TABLET, FILM COATED ORAL at 18:45

## 2023-10-25 RX ADMIN — ASPIRIN 81 MG: 81 TABLET, COATED ORAL at 11:09

## 2023-10-25 RX ADMIN — CLOPIDOGREL BISULFATE 75 MG: 75 TABLET ORAL at 11:09

## 2023-10-25 RX ADMIN — REGADENOSON 0.4 MG: 0.08 INJECTION, SOLUTION INTRAVENOUS at 09:44

## 2023-10-25 RX ADMIN — SODIUM CHLORIDE, PRESERVATIVE FREE 10 ML: 5 INJECTION INTRAVENOUS at 19:36

## 2023-10-25 RX ADMIN — CARVEDILOL 3.12 MG: 3.12 TABLET, FILM COATED ORAL at 11:09

## 2023-10-25 ASSESSMENT — PAIN SCALES - GENERAL: PAINLEVEL_OUTOF10: 0

## 2023-10-25 NOTE — CONSULTS
is fluent, no dysarthria, aphasia. Cranial nerves   II - visual field exam limited as she is blind in right eye and significantly impaired vision in left eye - forgot her glasses at home; R pupil 5 mm nonreactive, L pupil 3 mm minimally reactive (likely chronic)  III, IV, VI - extraocular muscles intact; has baseline right eye exotropia, no nystagmus  V - normal facial sensation                                                               VII - normal facial symmetry                                                             VIII - hearing decreased bilaterally                                                                   IX, X - symmetrical palate elevation                                               XI - symmetrical shoulder shrug                                                       XII - midline tongue without atrophy or fasciculation     Motor function  Strength: 5/5 RUE and LUE. S/p bilateral AKA but has 5/5 strength proximally at the hip  Normal bulk and tone. No tremors                      Sensory function Intact to touch throughout     Cerebellar Intact finger-nose-finger testing.      Reflex function Trace bilateral UE (-)Knox's sign bilaterally    Gait                  Deferred             Diagnostics:      Laboratory Testing:  CBC:   Recent Labs     10/23/23  2232 10/25/23  0518   WBC 6.9 6.3   HGB 13.1 11.4*    213     BMP:    Recent Labs     10/23/23  2232 10/25/23  0518    145*   K 3.4* 3.8   * 111*   CO2 27 25   BUN 13 19   CREATININE 1.2* 1.2*   GLUCOSE 145* 171*         Lab Results   Component Value Date    CHOL 159 10/24/2023    LDLCHOLESTEROL 93 10/24/2023    HDL 45 10/24/2023    TRIG 107 10/24/2023    ALT 7 10/23/2023    AST 17 10/23/2023    TSH 3.85 10/18/2014    INR 1.1 11/04/2021    LABA1C 6.7 (H) 10/25/2023    [id number] 797 03/20/2013    MG 2.0 10/23/2023         Imaging/Diagnostics:      Results for orders placed during the hospital encounter of CONTRAST    Narrative  EXAMINATION:  CT OF THE HEAD WITHOUT CONTRAST  10/24/2023 9:16 am    TECHNIQUE:  CT of the head was performed without the administration of intravenous  contrast. Automated exposure control, iterative reconstruction, and/or weight  based adjustment of the mA/kV was utilized to reduce the radiation dose to as  low as reasonably achievable. COMPARISON:  10/23/2023    HISTORY:  ORDERING SYSTEM PROVIDED HISTORY: left arm drift      FINDINGS:  BRAIN/VENTRICLES:  No evidence of an acute infarct or other acute parenchymal  process. No evidence of acute intracranial hemorrhage. There is no evidence  of an intracranial mass or extraaxial fluid collection. No significant mass  effect or midline shift. There is mild generalized volume loss. Ventricular  enlargement concordant with the degree of parenchymal volume loss. Scattered  patchy foci of low attenuation are present within supratentorial white matter  which is a nonspecific finding but likely represents mild chronic  microvascular ischemia. ORBITS: The visualized portion of the orbits demonstrate no acute abnormality. SINUSES:  The visualized paranasal sinuses and mastoid air cells demonstrate  no acute abnormality. SOFT TISSUES/SKULL: No acute abnormality of the visualized skull or soft  tissues. Impression  No acute cortical infarct or other acute intracranial process. The findings were sent to the Radiology Results [facility] at 9:29  am on 10/24/2023 to be communicated to a licensed caregiver, received by Dr. Beatris Arana at 9:38 am.      I personally reviewed all of the above medications, clinical laboratory, imaging and other diagnostic tests. Impression:      82-year-old right-handed female with past medical history of CAD, hypertension, hyperlipidemia, PVD, bilateral leg amputation, diabetes, glaucoma who initially presented with fall and dizziness.   Neurology consulted after stroke alert yesterday for left

## 2023-10-25 NOTE — PROGRESS NOTES
10/24/23  2015   POCGLU 128* 109* 177* 172*         Intake/Output Summary (Last 24 hours) at 10/25/2023 1111  Last data filed at 10/25/2023 1101  Gross per 24 hour   Intake --   Output 400 ml   Net -400 ml       General Appearance: alert, well appearing, and in no acute distress  Mental status: oriented to person, place, and time  Head: normocephalic, atraumatic  Eye: no icterus, redness, pupils equal and reactive, extraocular eye movements intact, conjunctiva clear  Ear: normal external ear, no discharge, hearing intact  Nose: no drainage noted  Mouth: mucous membranes moist  Neck: supple, no carotid bruits, thyroid not palpable  Lungs: Bilateral equal air entry, clear to ausculation, no wheezing, rales or rhonchi, normal effort  Cardiovascular: normal rate, regular rhythm, no murmur, gallop, rub  Abdomen: Soft, nontender, nondistended, normal bowel sounds, no hepatomegaly or splenomegaly  Neurologic: There are no new focal motor or sensory deficits, normal muscle tone and bulk, no abnormal sensation, normal speech, cranial nerves II through XII grossly intact  Skin: No gross lesions, rashes, bruising or bleeding on exposed skin area  Extremities: Bilateral above-knee amputation peripheral pulses palpable, no pedal edema or calf pain with palpation  Psych: normal affect    Investigations:      Laboratory Testing:  Recent Results (from the past 24 hour(s))   Echo (TTE) complete (PRN contrast/bubble/strain/3D)    Collection Time: 10/24/23 11:25 AM   Result Value Ref Range    LA Minor Axis 5.7 cm    LA Major Axis 4.7 cm    LA Area 2C 17.1 cm2    LA Area 4C 11.4 cm2    LA Volume 2C 44 22 - 52 mL    LA Volume 4C 22 22 - 52 mL    LA Volume BP 34 22 - 52 mL    LA Diameter 4.1 cm    RA Area 4C 12.0 cm2    RA Volume 26 ml    AV Mean Gradient 5 mmHg    AV VTI 38.3 cm    AV Mean Velocity 1.0 m/s    AV Peak Velocity 1.5 m/s    AV Peak Gradient 9 mmHg    AV Area by VTI 2.1 cm2    AV Area by Peak Velocity 2.0 cm2    Aortic Root blood sugar 15 she is also noted to have bradycardia with heart rate in 50s  This morning patient complained of left-sided weakness minimal drift was noted initially by the nurse but on my examination there was no motor or sensory deficit noted I discussed the case with the neuro interventional neurologist CT head was done which was negative clinically I do not feel there is a stroke or TIA no need for MRI  Diabetic diet Home medications started  OT PT  CT neck right  Severe stenosis of the right paraclinoid ICA. Patient is getting an MRI today and a stress test if negative discharge planning no neurological deficit noted  Outpatient neuro intervention        Consultations:   IP CONSULT TO CARDIOLOGY  IP CONSULT TO VASCULAR SURGERY  IP CONSULT TO NEUROLOGY     Patient is admitted as inpatient status because of co-morbidities listed above, severity of signs and symptoms as outlined, requirement for current medical therapies and most importantly because of direct risk to patient if care not provided in a hospital setting. Expected length of stay > 48 hours. Jeff Ruff MD  10/25/2023  11:11 AM    Copy sent to Dr. Galindo primary care provider on file. Please note that this chart was generated using voice recognition Dragon dictation software. Although every effort was made to ensure the accuracy of this automated transcription, some errors in transcription may have occurred.

## 2023-10-25 NOTE — PROGRESS NOTES
[facility]   Occupational Therapy Evaluation  Date: 10/25/23  Patient Name: Venecia Cutler       Room:   MRN: [id number]  Account: [de-identified]   : 1940  (82 y.o.) Gender: female     Discharge Recommendations:  Further Occupational Therapy is recommended upon facility discharge. OT Equipment Recommendations  Other: TBD    Referring Practitioner: MIRA Mcdermott NP  Diagnosis: Hypoglycemia; Other chest pain; Elevated troponin; hypertensive urgency; Fall, initial encounter Additional Pertinent Hx: Venecia Cutler is a 82 y.o. Non- / non  female who presents with Fall and Dizziness   and is admitted to the hospital for the management of Hypoglycemia. Venecia Cutler is a 82 y.o. Non- / non  female who presents with Fall and Dizziness and is admitted to the hospital for the management of Hypoglycemia. Medical history significant for CAD, HTN, HLD, PVD, bilateral leg amputation, DM type II, glaucoma. Presented to ED after fall from wheelchair, reporting dizziness. Denies loss of consciousness. CT head and neck negative for acute abnormality. Admits to shortness of breath but denies chest pain. Troponin elevated at 32 with repeat 33. Bradycardia in 50s which appears to be patient's baseline. Also found to be hypoglycemic with blood sugar of 15. She is on home insulin, lives alone and has poor eyesight from glaucoma.   Also significantly hypertensive with SBP in 200s initially    Treatment Diagnosis: Impaired self-care status    Past Medical History:  has a past medical history of Amputation of leg ([address]), CAD (coronary artery disease), CAD (coronary artery disease), Chronic back pain, Coughing, Depression, Diabetes, Diphtheria, Full dentures, GERD (gastroesophageal reflux disease), Glaucoma, Headache(784.0), History of blood transfusion, Hx of blood clots, Hyperlipidemia, Hypertension, IDDM (insulin dependent diabetes mellitus), Leg pain,

## 2023-10-25 NOTE — PROGRESS NOTES
10/25/23 140   Encounter Summary   Encounter Overview/Reason  Attempted Encounter   Service Provided For: Patient not available  (patient not in her room)

## 2023-10-25 NOTE — PROGRESS NOTES
Physical Therapy  Facility/Department: [address] [place] [address]  Physical Therapy Initial Assessment    Name: Frank Esparza  : 1940  MRN: [id number]  Date of Service: 10/25/2023    Discharge Recommendations:  No therapy recommended at discharge   PT Equipment Recommendations  Wheelchair: Power Wheelchair      Patient Diagnosis(es): The primary encounter diagnosis was Hypertensive urgency. Diagnoses of Fall, initial encounter, Hyperglycemia, Other chest pain, and Elevated troponin were also pertinent to this visit. Past Medical History:  has a past medical history of Amputation of leg ([address]), CAD (coronary artery disease), CAD (coronary artery disease), Chronic back pain, Coughing, Depression, Diabetes, Diphtheria, Full dentures, GERD (gastroesophageal reflux disease), Glaucoma, Headache(784.0), History of blood transfusion, Hx of blood clots, Hyperlipidemia, Hypertension, IDDM (insulin dependent diabetes mellitus), Leg pain, bilateral, LONG TERM ANTICOAGULENT USE, Neuropathy, Numbness of toes, Obesity, Osteoarthritis, Peripheral vascular disease ([address]), Rash, skin, Type II or unspecified type diabetes mellitus without mention of complication, not stated as uncontrolled, Wears dentures, Wears glasses, and Wears glasses. Past Surgical History:  has a past surgical history that includes Bypass Graft (); Hysterectomy, total abdominal; Cataract removal (); Coronary artery bypass graft (); Coronary angioplasty; and Leg amputation through knee (Left). Assessment   Assessment: Pt demo's Mildly impaired mobility and safety requiring SBA; Notable issues with current WC mobility device warranting consult with WC/Seating specialist to improve safety and independence with mobility, and prevent injury, skin break down, and overuse injuries.  PT services warranted to progress function and safety  Treatment Diagnosis: Impaired mobility and safety; Poor condition of mobility device leading to safety concerns  Therapy

## 2023-10-26 PROBLEM — R94.39 ABNORMAL CARDIOVASCULAR STRESS TEST: Status: ACTIVE | Noted: 2023-10-26

## 2023-10-26 LAB
ANION GAP SERPL CALCULATED.3IONS-SCNC: 11 MMOL/L (ref 9–17)
BACTERIA URNS QL MICRO: ABNORMAL
BASOPHILS # BLD: 0 K/UL (ref 0–0.2)
BASOPHILS NFR BLD: 0 % (ref 0–2)
BILIRUB UR QL STRIP: NEGATIVE
BUN SERPL-MCNC: 27 MG/DL (ref 8–23)
CALCIUM SERPL-MCNC: 9.1 MG/DL (ref 8.6–10.4)
CASTS #/AREA URNS LPF: ABNORMAL /LPF
CHLORIDE SERPL-SCNC: 107 MMOL/L (ref 98–107)
CLARITY UR: CLEAR
CO2 SERPL-SCNC: 23 MMOL/L (ref 20–31)
COLOR UR: YELLOW
CREAT SERPL-MCNC: 1.7 MG/DL (ref 0.5–0.9)
EOSINOPHIL # BLD: 0.1 K/UL (ref 0–0.4)
EOSINOPHILS RELATIVE PERCENT: 3 % (ref 0–4)
EPI CELLS #/AREA URNS HPF: ABNORMAL /HPF
ERYTHROCYTE [DISTWIDTH] IN BLOOD BY AUTOMATED COUNT: 13.7 % (ref 11.5–14.9)
EST. AVERAGE GLUCOSE BLD GHB EST-MCNC: 143 MG/DL
GFR SERPL CREATININE-BSD FRML MDRD: 30 ML/MIN/1.73M2
GLUCOSE BLD-MCNC: 230 MG/DL (ref 65–105)
GLUCOSE BLD-MCNC: 260 MG/DL (ref 65–105)
GLUCOSE BLD-MCNC: 271 MG/DL (ref 65–105)
GLUCOSE SERPL-MCNC: 288 MG/DL (ref 70–99)
GLUCOSE UR STRIP-MCNC: NEGATIVE MG/DL
HBA1C MFR BLD: 6.6 % (ref 4–6)
HCT VFR BLD AUTO: 34.3 % (ref 36–46)
HGB BLD-MCNC: 11.5 G/DL (ref 12–16)
HGB UR QL STRIP.AUTO: NEGATIVE
KETONES UR STRIP-MCNC: NEGATIVE MG/DL
LEUKOCYTE ESTERASE UR QL STRIP: NEGATIVE
LYMPHOCYTES NFR BLD: 1.4 K/UL (ref 1–4.8)
LYMPHOCYTES RELATIVE PERCENT: 25 % (ref 24–44)
MCH RBC QN AUTO: 30.3 PG (ref 26–34)
MCHC RBC AUTO-ENTMCNC: 33.5 G/DL (ref 31–37)
MCV RBC AUTO: 90.6 FL (ref 80–100)
MONOCYTES NFR BLD: 0.5 K/UL (ref 0.1–1.3)
MONOCYTES NFR BLD: 10 % (ref 1–7)
NEUTROPHILS NFR BLD: 62 % (ref 36–66)
NEUTS SEG NFR BLD: 3.3 K/UL (ref 1.3–9.1)
NITRITE UR QL STRIP: NEGATIVE
PH UR STRIP: 6 [PH] (ref 5–8)
PLATELET # BLD AUTO: 173 K/UL (ref 150–450)
PMV BLD AUTO: 8.7 FL (ref 6–12)
POTASSIUM SERPL-SCNC: 4 MMOL/L (ref 3.7–5.3)
PROT UR STRIP-MCNC: ABNORMAL MG/DL
RBC # BLD AUTO: 3.79 M/UL (ref 4–5.2)
RBC #/AREA URNS HPF: ABNORMAL /HPF
SODIUM SERPL-SCNC: 141 MMOL/L (ref 135–144)
SP GR UR STRIP: 1.02 (ref 1–1.03)
UROBILINOGEN UR STRIP-ACNC: NORMAL EU/DL (ref 0–1)
WBC #/AREA URNS HPF: ABNORMAL /HPF
WBC OTHER # BLD: 5.4 K/UL (ref 3.5–11)

## 2023-10-26 PROCEDURE — 2580000003 HC RX 258: Performed by: NURSE PRACTITIONER

## 2023-10-26 PROCEDURE — 6370000000 HC RX 637 (ALT 250 FOR IP): Performed by: NURSE PRACTITIONER

## 2023-10-26 PROCEDURE — 6370000000 HC RX 637 (ALT 250 FOR IP): Performed by: INTERNAL MEDICINE

## 2023-10-26 PROCEDURE — 99232 SBSQ HOSP IP/OBS MODERATE 35: CPT | Performed by: INTERNAL MEDICINE

## 2023-10-26 PROCEDURE — 99232 SBSQ HOSP IP/OBS MODERATE 35: CPT | Performed by: PSYCHIATRY & NEUROLOGY

## 2023-10-26 PROCEDURE — 97110 THERAPEUTIC EXERCISES: CPT

## 2023-10-26 PROCEDURE — 80048 BASIC METABOLIC PNL TOTAL CA: CPT

## 2023-10-26 PROCEDURE — 2060000000 HC ICU INTERMEDIATE R&B

## 2023-10-26 PROCEDURE — 1200000000 HC SEMI PRIVATE

## 2023-10-26 PROCEDURE — 82947 ASSAY GLUCOSE BLOOD QUANT: CPT

## 2023-10-26 PROCEDURE — 81001 URINALYSIS AUTO W/SCOPE: CPT

## 2023-10-26 PROCEDURE — 6360000002 HC RX W HCPCS: Performed by: NURSE PRACTITIONER

## 2023-10-26 PROCEDURE — 6370000000 HC RX 637 (ALT 250 FOR IP): Performed by: SURGERY

## 2023-10-26 PROCEDURE — 36415 COLL VENOUS BLD VENIPUNCTURE: CPT

## 2023-10-26 PROCEDURE — 85025 COMPLETE CBC W/AUTO DIFF WBC: CPT

## 2023-10-26 PROCEDURE — 83036 HEMOGLOBIN GLYCOSYLATED A1C: CPT

## 2023-10-26 PROCEDURE — 99233 SBSQ HOSP IP/OBS HIGH 50: CPT | Performed by: INTERNAL MEDICINE

## 2023-10-26 RX ORDER — ATORVASTATIN CALCIUM 80 MG/1
80 TABLET, FILM COATED ORAL NIGHTLY
Status: DISCONTINUED | OUTPATIENT
Start: 2023-10-26 | End: 2023-10-28 | Stop reason: HOSPADM

## 2023-10-26 RX ORDER — 0.9 % SODIUM CHLORIDE 0.9 %
500 INTRAVENOUS SOLUTION INTRAVENOUS ONCE
Status: COMPLETED | OUTPATIENT
Start: 2023-10-26 | End: 2023-10-26

## 2023-10-26 RX ORDER — ENOXAPARIN SODIUM 100 MG/ML
30 INJECTION SUBCUTANEOUS DAILY
Status: DISCONTINUED | OUTPATIENT
Start: 2023-10-27 | End: 2023-10-28 | Stop reason: HOSPADM

## 2023-10-26 RX ORDER — CARVEDILOL 6.25 MG/1
6.25 TABLET ORAL 2 TIMES DAILY WITH MEALS
Status: DISCONTINUED | OUTPATIENT
Start: 2023-10-26 | End: 2023-10-28 | Stop reason: HOSPADM

## 2023-10-26 RX ORDER — GLIPIZIDE 5 MG/1
5 TABLET ORAL
Status: DISCONTINUED | OUTPATIENT
Start: 2023-10-26 | End: 2023-10-28 | Stop reason: HOSPADM

## 2023-10-26 RX ORDER — DEXTROSE MONOHYDRATE 100 MG/ML
INJECTION, SOLUTION INTRAVENOUS CONTINUOUS PRN
Status: DISCONTINUED | OUTPATIENT
Start: 2023-10-26 | End: 2023-10-26

## 2023-10-26 RX ORDER — SODIUM CHLORIDE 9 MG/ML
INJECTION, SOLUTION INTRAVENOUS CONTINUOUS
Status: DISCONTINUED | OUTPATIENT
Start: 2023-10-26 | End: 2023-10-27

## 2023-10-26 RX ADMIN — GUAIFENESIN 600 MG: 600 TABLET, EXTENDED RELEASE ORAL at 21:17

## 2023-10-26 RX ADMIN — ASPIRIN 81 MG: 81 TABLET, COATED ORAL at 08:19

## 2023-10-26 RX ADMIN — SODIUM CHLORIDE: 9 INJECTION, SOLUTION INTRAVENOUS at 21:18

## 2023-10-26 RX ADMIN — PREGABALIN 100 MG: 100 CAPSULE ORAL at 21:17

## 2023-10-26 RX ADMIN — CLOPIDOGREL BISULFATE 75 MG: 75 TABLET ORAL at 08:19

## 2023-10-26 RX ADMIN — HYDRALAZINE HYDROCHLORIDE 25 MG: 25 TABLET, FILM COATED ORAL at 04:41

## 2023-10-26 RX ADMIN — HYDROCHLOROTHIAZIDE 25 MG: 25 TABLET ORAL at 08:19

## 2023-10-26 RX ADMIN — SODIUM CHLORIDE 500 ML: 9 INJECTION, SOLUTION INTRAVENOUS at 05:40

## 2023-10-26 RX ADMIN — PREGABALIN 100 MG: 100 CAPSULE ORAL at 08:19

## 2023-10-26 RX ADMIN — GUAIFENESIN 600 MG: 600 TABLET, EXTENDED RELEASE ORAL at 08:18

## 2023-10-26 RX ADMIN — AMLODIPINE BESYLATE 10 MG: 10 TABLET ORAL at 08:20

## 2023-10-26 RX ADMIN — GLIPIZIDE 5 MG: 5 TABLET ORAL at 18:24

## 2023-10-26 RX ADMIN — GLIPIZIDE 5 MG: 5 TABLET ORAL at 12:13

## 2023-10-26 RX ADMIN — SODIUM CHLORIDE, PRESERVATIVE FREE 10 ML: 5 INJECTION INTRAVENOUS at 08:20

## 2023-10-26 RX ADMIN — SODIUM CHLORIDE, PRESERVATIVE FREE 10 ML: 5 INJECTION INTRAVENOUS at 21:17

## 2023-10-26 RX ADMIN — ENOXAPARIN SODIUM 40 MG: 100 INJECTION SUBCUTANEOUS at 08:20

## 2023-10-26 RX ADMIN — HYDRALAZINE HYDROCHLORIDE 25 MG: 25 TABLET, FILM COATED ORAL at 13:16

## 2023-10-26 RX ADMIN — CARVEDILOL 3.12 MG: 3.12 TABLET, FILM COATED ORAL at 08:19

## 2023-10-26 RX ADMIN — HYDRALAZINE HYDROCHLORIDE 25 MG: 25 TABLET, FILM COATED ORAL at 00:34

## 2023-10-26 RX ADMIN — ATORVASTATIN CALCIUM 80 MG: 80 TABLET, FILM COATED ORAL at 21:17

## 2023-10-26 RX ADMIN — CARVEDILOL 6.25 MG: 6.25 TABLET, FILM COATED ORAL at 18:24

## 2023-10-26 RX ADMIN — SODIUM CHLORIDE: 9 INJECTION, SOLUTION INTRAVENOUS at 08:18

## 2023-10-26 RX ADMIN — VALSARTAN 160 MG: 320 TABLET ORAL at 08:20

## 2023-10-26 RX ADMIN — HYDRALAZINE HYDROCHLORIDE 25 MG: 25 TABLET, FILM COATED ORAL at 21:17

## 2023-10-26 NOTE — PROGRESS NOTES
* 111* 107   CO2 27 25 23   BUN 13 19 27*   CREATININE 1.2* 1.2* 1.7*   GLUCOSE 145* 171* 288*         Lab Results   Component Value Date    CHOL 159 10/24/2023    LDLCHOLESTEROL 93 10/24/2023    HDL 45 10/24/2023    TRIG 107 10/24/2023    ALT 7 10/23/2023    AST 17 10/23/2023    TSH 3.85 10/18/2014    INR 1.1 11/04/2021    LABA1C 6.6 (H) 10/26/2023    [id number] 797 03/20/2013    MG 2.0 10/23/2023           Imaging/Diagnostics:      Results for orders placed during the hospital encounter of 10/23/23    MRI BRAIN WO CONTRAST    Narrative  EXAMINATION:  MRI OF THE BRAIN WITHOUT CONTRAST  10/25/2023 1:51 pm    TECHNIQUE:  Multiplanar multisequence MRI of the brain was performed without the  administration of intravenous contrast.    COMPARISON:  None    HISTORY:  ORDERING SYSTEM PROVIDED HISTORY: TIA  TECHNOLOGIST PROVIDED HISTORY:  TIA  Reason for Exam: TIA, fall, dizziness    Initial evaluation    FINDINGS:  INTRACRANIAL STRUCTURES/VENTRICLES: There is an area of restricted diffusion  in the high right periventricular white matter measuring 3-4 mm that likely  represents an acute to subacute area of infarct. There is a 2nd 5-6 mm area  of restricted diffusion in the posterior right temporal lobe suggestive of an  acute area of infarct. No other areas of restricted diffusion are identified. Motion artifact degrades some of the images. Fast scanning techniques were  utilized. The ventricles and cisternal spaces are prominent consistent with  cerebral atrophy. There are several punctate areas of high-signal in the  periventricular white matter and centrum semiovale and central kwesi that are  likely related to chronic small vessel ischemic disease. There is no midline  shift or mass effect. ORBITS: The visualized portion of the orbits demonstrate no acute abnormality. SINUSES: There is mild mucoperiosteal thickening of the maxillary sinuses and  ethmoid air cells.  The remainder of the sinuses are intracranial process. The findings were sent to the Radiology Results [address] at 9:29  am on 10/24/2023 to be communicated to a licensed caregiver, received by Dr. Ludmila South at 9:38 am.          I personally reviewed all of the above medications, clinical laboratory, imaging and other diagnostic tests. Impression:       82-year-old right-handed female with past medical history of CAD, hypertension, hyperlipidemia, PVD, bilateral leg amputation, diabetes, glaucoma who initially presented with fall and dizziness. Neurology consulted after stroke alert for left sided weakness. CTA with severe right ICA stenosis. MRI brain with subtle, punctate, posterior right temporal and high right frontal white matter diffusion restriction. She does have right ICA stenosis. Likely she also had symptomatic hypotension/low perfusion given her blood pressure was elevated upon admission systolic >523, was 575 during stroke alert. She remains at baseline, no new neurological deficits. Transient left sided weakness  Punctate, posterior right temporal and high right frontal white matter infarcts     Plan:      Neuro checks q 4 hrs  Goal -160, avoid hypotension  On asa 81 mg at home. Started on plavix. Recommend DAPT for 3 months, then plavix only. On lipitor 40 mg daily, LDL 93  A1c 6.7  TTE with no acute findings  PT/OT  Counseled aggressively on stroke risk factor modification including blood pressure control, medication compliance, diet, controlling diabetes. Follow-up with outpatient neurology in 4 to 6 weeks      No further inpatient neurological work-up indicated at this time. We will sign off for now.   Please call if any further questions      Electronically signed by Ander Mohs, DO on 10/26/2023 at 1:41 PM      Ander Mohs, [facility]  Neurology

## 2023-10-26 NOTE — CONSULTS
NEPHROLOGY CONSULT       Reason for Consult:Acute kidney injury      Chief Complaint:  Fall and dizziness  History Obtained From:  Patient and EHR records. History of Present Illness: This is a 82 y.o. female With hypertension, CAD, dyslipidemia, type 2 diabetes mellitus with diabetic neuropathy and bilateral above-knee amputation, who presented with Fall and dizziness. She had fallen from her wheelchair on 10/23/2023 and experienced lightheadedness but no loss of consciousness. Patient  was found to be hypoglycemic with blood sugar of 15 and hypertensive with systolic blood pressure over 200. Subsequently pt was found on the floor on 10/24/2023 with weakness on the left side by nursing staff. CTA brain did not show acute findings but CTA head and neck showed severe right ICA stenosis. .  Patient was also found to have  elevated troponin and had myocardial perfusion stress test which was positive. She was initially planned for cardiac cath at which time nephrology was consulted today for clearance. She has no chest pain. She had a rising creatinine from 1.2 mg/dL yesterday to 1.7 mg/dL this morning. She did receive CTA head and neck with IV contrast on 10/24 /23- she is nonoliguric, baseline creatinine is 1.1 to 1.2 mg/dL      Pt denies any history of  prolonged NSAID use. Patient denies dysuria, gross hematuria, flank pain, nocturia, urgency, passing frothy urine or urinary incontinence.       Past Medical History:        Diagnosis Date    Amputation of leg ([address])     left aka    CAD (coronary artery disease)     CAD (coronary artery disease)     Chronic back pain     Coughing     Depression     Diabetes     Diphtheria     Full dentures     GERD (gastroesophageal reflux disease) 10/10/2013    Glaucoma     Headache(784.0)     History of blood transfusion     Hx of blood clots     Hyperlipidemia     Hypertension     IDDM (insulin dependent diabetes mellitus)     Leg pain, bilateral     LONG TERM ANTICOAGULENT 144 145* 141   K 3.4* 3.8 4.0   * 111* 107   CO2 27 25 23   BUN 13 19 27*   CREATININE 1.2* 1.2* 1.7*   GLUCOSE 145* 171* 288*   CALCIUM 9.1 8.7 9.1        Phosphorus:  No results for input(s): \"PHOS\" in the last 72 hours. Magnesium:   Recent Labs     10/23/23  2232   MG 2.0     Albumin:   Recent Labs     10/23/23  2232   LABALBU 3.0*       IRON:    Lab Results   Component Value Date/Time    IRON 43 03/20/2013 12:39 PM     Iron Saturation:  No components found for: \"PERCENTFE\"  TIBC:    Lab Results   Component Value Date/Time    TIBC 245 03/20/2013 12:39 PM     FERRITIN:    Lab Results   Component Value Date/Time    FERRITIN 63 03/20/2013 12:39 PM     SPEP:   Lab Results   Component Value Date/Time    PROT 6.1 10/23/2023 10:32 PM     UPEP: No results found for: \"TPU\"     C3: No results found for: \"C3\"  C4: No results found for: \"C4\"  MPO ANCA:  No results found for: \"MPO\" .   PR3 ANCA:  No results found for: \"PR3\"  Urine Sodium:    Lab Results   Component Value Date/Time    MARI 134 10/19/2014 10:39 AM      Urine Potassium:  No results found for: \"KUR\"  Urine Chloride:  No results found for: \"CLU\"  Urine Ph:  No components found for: \"PO4U\"  Urine Osmolarity:  No results found for: \"OSMOU\"  Urine Creatinine:    Lab Results   Component Value Date/Time    LABCREA 116.2 12/29/2014 01:08 PM     Urine Eosinophils: No components found for: \"EOSU\"  Urine Protein:  No results found for: \"TPU\"  Urinalysis:  U/A:   Lab Results   Component Value Date/Time    NITRU NEGATIVE 10/26/2023 10:21 AM    COLORU Yellow 10/26/2023 10:21 AM    PHUR 6.0 10/26/2023 10:21 AM    WBCUA 0 TO 2 10/26/2023 10:21 AM    RBCUA 0 TO 2 10/26/2023 10:21 AM    MUCUS NOT REPORTED 10/21/2021 04:14 PM    TRICHOMONAS NOT REPORTED 10/21/2021 04:14 PM    YEAST NOT REPORTED 10/21/2021 04:14 PM    BACTERIA None 10/26/2023 10:21 AM    SPECGRAV 1.017 10/26/2023 10:21 AM    LEUKOCYTESUR NEGATIVE 10/26/2023 10:21 AM    UROBILINOGEN Normal 10/26/2023 10:21 AM

## 2023-10-26 NOTE — PROGRESS NOTES
Turbidity UA Clear Clear    Glucose, Ur NEGATIVE NEGATIVE mg/dL    Bilirubin Urine NEGATIVE NEGATIVE    Ketones, Urine NEGATIVE NEGATIVE mg/dL    Specific Gravity, UA 1.017 1.000 - 1.030    Urine Hgb NEGATIVE NEGATIVE    pH, UA 6.0 5.0 - 8.0    Protein, UA 2+ (A) NEGATIVE mg/dL    Urobilinogen, Urine Normal 0.0 - 1.0 EU/dL    Nitrite, Urine NEGATIVE NEGATIVE    Leukocyte Esterase, Urine NEGATIVE NEGATIVE    WBC, UA 0 TO 2 (A) 0 TO 5 /HPF    RBC, UA 0 TO 2 0 TO 2 /HPF    Casts UA 0 TO 2 (A) None /LPF    Epithelial Cells UA 0 TO 2 /HPF    Bacteria, UA None None       Imaging/Diagnostics:  CT HEAD WO CONTRAST    Result Date: 10/24/2023  No acute cortical infarct or other acute intracranial process. The findings were sent to the Radiology Results [employer] at 9:29 am on 10/24/2023 to be communicated to a licensed caregiver, received by Dr. Adri Garsia at 9:38 am.     CTA HEAD NECK W CONTRAST    Result Date: 10/24/2023  Severe stenosis of the right paraclinoid ICA. No flow limiting stenosis or occlusion within the neck. XR SHOULDER RIGHT (MIN 2 VIEWS)    Result Date: 10/23/2023  No acute fracture or dislocation is identified. XR CHEST PORTABLE    Result Date: 10/23/2023  No radiographic evidence of acute thoracic injury. CT CERVICAL SPINE WO CONTRAST    Result Date: 10/23/2023  1. No acute fracture or subluxation of the cervical spine is identified. 2. Multilevel degenerative changes of the cervical spine, most notably at C6-C7 where there is degenerative disc disease with extensive adjacent endplate changes. Sequelae of prior infection cannot be entirely excluded at this level. 3. Mild grade 1 anterolisthesis of C3 on C4.     CT HEAD WO CONTRAST    Result Date: 10/23/2023  1. No evidence of acute calvarial fracture or intracranial hemorrhage. 2. Mild age related involutional changes, as above.        Assessment :      Hospital Problems             Last Modified POA    * (Principal) Hypoglycemia 10/24/2023 most importantly because of direct risk to patient if care not provided in a hospital setting. Expected length of stay > 48 hours. Santy Beyer MD  10/26/2023  11:14 AM    Copy sent to Dr. Antonieta Phalen primary care provider on file. Please note that this chart was generated using voice recognition Dragon dictation software. Although every effort was made to ensure the accuracy of this automated transcription, some errors in transcription may have occurred.

## 2023-10-26 NOTE — PLAN OF CARE
Problem: Discharge Planning  Goal: Discharge to home or other facility with appropriate resources  Outcome: Progressing  Flowsheets (Taken 10/26/2023 0142)  Discharge to home or other facility with appropriate resources:   Identify barriers to discharge with patient and caregiver   Arrange for needed discharge resources and transportation as appropriate   Identify discharge learning needs (meds, wound care, etc)   Refer to discharge planning if patient needs post-hospital services based on physician order or complex needs related to functional status, cognitive ability or social support system     Problem: Safety - Adult  Goal: Free from fall injury  Outcome: Progressing     Problem: Chronic Conditions and Co-morbidities  Goal: Patient's chronic conditions and co-morbidity symptoms are monitored and maintained or improved  Outcome: Progressing  Flowsheets (Taken 10/26/2023 0142)  Care Plan - Patient's Chronic Conditions and Co-Morbidity Symptoms are Monitored and Maintained or Improved: Monitor and assess patient's chronic conditions and comorbid symptoms for stability, deterioration, or improvement     Problem: Skin/Tissue Integrity  Goal: Absence of new skin breakdown  Description: 1. Monitor for areas of redness and/or skin breakdown  2. Assess vascular access sites hourly  3. Every 4-6 hours minimum:  Change oxygen saturation probe site  4. Every 4-6 hours:  If on nasal continuous positive airway pressure, respiratory therapy assess nares and determine need for appliance change or resting period.   Outcome: Progressing     Problem: ABCDS Injury Assessment  Goal: Absence of physical injury  Outcome: Progressing  Flowsheets (Taken 10/26/2023 0142)  Absence of Physical Injury: Implement safety measures based on patient assessment

## 2023-10-26 NOTE — PROGRESS NOTES
[facility]  Progress Note                   Date:   10/26/2023  Patient name: Riaz Spencer  Date of admission:  10/23/2023  7:59 PM  MRN:   [id number]  YOB: 1940  PCP: No primary care provider on file. Reason for Admission: Other chest pain [R07.89]  Hypoglycemia [E16.2]  Hyperglycemia [R73.9]  Elevated troponin [R79.89]  Hypertensive urgency [I16.0]  Fall, initial encounter [W19. XXXA]    Subjective:       Clinical Changes /Abnormalities: Seen & examined alone in room. Yesterday events and MRI reviewed. She denies any CP or SOB/STAPLETON. Labs, vitals, & tele reviewed. Stress as below    Review of Systems    Medications:   Scheduled Meds:   atorvastatin  80 mg Oral Nightly    [START ON 10/27/2023] enoxaparin  30 mg SubCUTAneous Daily    glipiZIDE  5 mg Oral BID AC    guaiFENesin  600 mg Oral BID    amLODIPine  10 mg Oral Daily    aspirin  81 mg Oral Daily    [Held by provider] insulin aspart protamine-insulin aspart  30 Units SubCUTAneous BID WC    pregabalin  100 mg Oral BID    sodium chloride flush  5-40 mL IntraVENous 2 times per day    hydrALAZINE  25 mg Oral 3 times per day    carvedilol  3.125 mg Oral BID WC    clopidogrel  75 mg Oral Daily     Continuous Infusions:   sodium chloride 100 mL/hr at 10/26/23 0818    sodium chloride      dextrose       CBC:   Recent Labs     10/23/23  2232 10/25/23  0518 10/26/23  0515   WBC 6.9 6.3 5.4   HGB 13.1 11.4* 11.5*    213 173       BMP:    Recent Labs     10/23/23  2232 10/25/23  0518 10/26/23  0515    145* 141   K 3.4* 3.8 4.0   * 111* 107   CO2 27 25 23   BUN 13 19 27*   CREATININE 1.2* 1.2* 1.7*   GLUCOSE 145* 171* 288*       Hepatic:  Recent Labs     10/23/23  2232   AST 17   ALT 7   BILITOT 0.3   ALKPHOS 113*       Troponin:   Recent Labs     10/24/23  0124 10/24/23  0601 10/24/23  0843   TROPHS 33* 34* 36*       BNP: No results for input(s): \"BNP\" in the last 72 hours.   Lipids:   Recent Labs     10/24/23  2006 left ventricular myocardium with partial reversibility suggesting prior lateral wall infarct with ten-infarct ischemia.]  Function: [Subtle mild lateral wall hypokinesis.]  Risk stratification: [High based on perfusion]    Assessment / Acute Cardiac Problems:   Dizziness  CAD with prior CABG & stenting as above  Preserved LVEF on last echo  HTN  HLP  DM2  PAD with b/l AKA  Obesity\  SUSANA  Ac CVA      Plan of Treatment:   Stable. Denies any CP or SOB. Stress test as above however given MRI showing acute stroke we are unable to proceed with cath at this time. Plan to continue medication management. PO ASA, statin, Plavix (home med), BB. No ACE/ARB/ARNI d/t CKD3  BP elevated. Continue Norvasc & Hydralazine and will increase COreg  Echo as above with LVEF 65-70% and no significant valvular disease    Electronically signed by MIRA Mckeon CNP on 10/26/2023 at 12:31 PM  [address] [place] [address].  [phone]    Attending Cardiologist Addendum: I have reviewed and performed the history, physical, subjective, objective, assessment, and plan with the student/resident/fellow/APN and agree with the note. I performed the history and physical personally. I have made changes to the note above as needed. Aware of her abnormal stress test.  She is CP free  She has low risk echo    Now she has Ac CVA on MRI  Also has rising Cr  Will hold off on cath, recommend re eval in 2 - 4 weeks as outpatient post recovery from CVA and if okay with neurology. Will follow from a distance, please have her see us in clinic in 2-4 weeks. Discussed with patient in detail. All questions answered. Agrees with plan as outlined above. Thank you for allowing me to participate in the care of this patient, please do not hesitate to call if you have any questions[place], [address] [facility], [address] [facility]  [facility]  [phone]

## 2023-10-26 NOTE — PROGRESS NOTES
[address] [facility]   INPATIENT OCCUPATIONAL THERAPY  PROGRESS NOTE  Date: 10/26/2023  Patient Name: Raul Loza       Room: 7-01  MRN: [id number]    : 1940  (82 y.o.)  Gender: female   Referring Practitioner: MIRA Capone NP  Diagnosis: Hypoglycemia; Other chest pain; Elevated troponin; hypertensive urgency; Fall, initial encounter      Discharge Recommendations:  Further Occupational Therapy is recommended upon facility discharge. OT Equipment Recommendations  Other: TBD    Restrictions/Precautions  Restrictions/Precautions  Restrictions/Precautions: Fall Risk;General Precautions; Up as Tolerated  Required Braces or Orthoses?: No  Implants present? : Metal implants (cardiac stents)  Position Activity Restriction  Other position/activity restrictions: Up with assistance     Subjective  Subjective  Pain: Pt denies  Comments: RN Pat Binder treatment, pt sleeping upon arrival. Pt agreeable to seated ex's once awake and alert. Objective  Orientation  Overall Orientation Status: Impaired  Orientation Level: Oriented to place;Oriented to person;Oriented to time (accurate year however reports month of August.)  Cognition  Overall Cognitive Status: Exceptions  Arousal/Alertness: Appropriate responses to stimuli  Following Commands: Follows one step commands with increased time; Follows one step commands with repetition  Attention Span: Attends with cues to redirect  Memory: Appears intact  Safety Judgement: Decreased awareness of need for assistance;Decreased awareness of need for safety  Problem Solving: Assistance required to generate solutions;Assistance required to implement solutions;Assistance required to identify errors made;Assistance required to correct errors made  Insights: Decreased awareness of deficits  Initiation: Requires cues for all  Sequencing: Requires cues for some    Activities of Daily Living  ADL  Feeding: Minimal assistance (per off regular upper body clothing?: A Little  How much help is needed for taking care of personal grooming?: A Little  How much help for eating meals?: A Little  AM-PAC Inpatient Daily Activity Raw Score: 16  AM-PAC Inpatient ADL T-Scale Score : 35.96  ADL Inpatient CMS 0-100% Score: 53.32  ADL Inpatient CMS G-Code Modifier : CK    OT Minutes  OT Individual Minutes  Time In: 3895  Time Out: 3888  Minutes: 17      Electronically signed by HEIKE Bah on 10/26/23 at 12:12 PM EDT

## 2023-10-27 LAB
ANION GAP SERPL CALCULATED.3IONS-SCNC: 8 MMOL/L (ref 9–17)
BASOPHILS # BLD: 0 K/UL (ref 0–0.2)
BASOPHILS NFR BLD: 0 % (ref 0–2)
BUN SERPL-MCNC: 21 MG/DL (ref 8–23)
CALCIUM SERPL-MCNC: 8.8 MG/DL (ref 8.6–10.4)
CHLORIDE SERPL-SCNC: 114 MMOL/L (ref 98–107)
CO2 SERPL-SCNC: 24 MMOL/L (ref 20–31)
CREAT SERPL-MCNC: 1.2 MG/DL (ref 0.5–0.9)
EOSINOPHIL # BLD: 0.1 K/UL (ref 0–0.4)
EOSINOPHILS RELATIVE PERCENT: 3 % (ref 0–4)
ERYTHROCYTE [DISTWIDTH] IN BLOOD BY AUTOMATED COUNT: 13.8 % (ref 11.5–14.9)
GFR SERPL CREATININE-BSD FRML MDRD: 45 ML/MIN/1.73M2
GLUCOSE BLD-MCNC: 228 MG/DL (ref 65–105)
GLUCOSE BLD-MCNC: 324 MG/DL (ref 65–105)
GLUCOSE SERPL-MCNC: 270 MG/DL (ref 70–99)
HCT VFR BLD AUTO: 34.1 % (ref 36–46)
HGB BLD-MCNC: 11 G/DL (ref 12–16)
LYMPHOCYTES NFR BLD: 1.4 K/UL (ref 1–4.8)
LYMPHOCYTES RELATIVE PERCENT: 29 % (ref 24–44)
MCH RBC QN AUTO: 29.3 PG (ref 26–34)
MCHC RBC AUTO-ENTMCNC: 32.4 G/DL (ref 31–37)
MCV RBC AUTO: 90.7 FL (ref 80–100)
MONOCYTES NFR BLD: 0.4 K/UL (ref 0.1–1.3)
MONOCYTES NFR BLD: 9 % (ref 1–7)
NEUTROPHILS NFR BLD: 59 % (ref 36–66)
NEUTS SEG NFR BLD: 2.7 K/UL (ref 1.3–9.1)
PLATELET # BLD AUTO: 174 K/UL (ref 150–450)
PMV BLD AUTO: 8.8 FL (ref 6–12)
POTASSIUM SERPL-SCNC: 3.8 MMOL/L (ref 3.7–5.3)
RBC # BLD AUTO: 3.76 M/UL (ref 4–5.2)
REASON FOR REJECTION: NORMAL
SODIUM SERPL-SCNC: 146 MMOL/L (ref 135–144)
SPECIMEN SOURCE: NORMAL
WBC OTHER # BLD: 4.6 K/UL (ref 3.5–11)
ZZ NTE CLEAN UP: ORDERED TEST: NORMAL

## 2023-10-27 PROCEDURE — 82947 ASSAY GLUCOSE BLOOD QUANT: CPT

## 2023-10-27 PROCEDURE — 2580000003 HC RX 258: Performed by: NURSE PRACTITIONER

## 2023-10-27 PROCEDURE — 6370000000 HC RX 637 (ALT 250 FOR IP): Performed by: NURSE PRACTITIONER

## 2023-10-27 PROCEDURE — 85025 COMPLETE CBC W/AUTO DIFF WBC: CPT

## 2023-10-27 PROCEDURE — 6370000000 HC RX 637 (ALT 250 FOR IP): Performed by: INTERNAL MEDICINE

## 2023-10-27 PROCEDURE — 1200000000 HC SEMI PRIVATE

## 2023-10-27 PROCEDURE — 36415 COLL VENOUS BLD VENIPUNCTURE: CPT

## 2023-10-27 PROCEDURE — 6370000000 HC RX 637 (ALT 250 FOR IP): Performed by: SURGERY

## 2023-10-27 PROCEDURE — 80048 BASIC METABOLIC PNL TOTAL CA: CPT

## 2023-10-27 PROCEDURE — 2060000000 HC ICU INTERMEDIATE R&B

## 2023-10-27 PROCEDURE — 99233 SBSQ HOSP IP/OBS HIGH 50: CPT | Performed by: INTERNAL MEDICINE

## 2023-10-27 RX ORDER — HYDRALAZINE HYDROCHLORIDE 50 MG/1
50 TABLET, FILM COATED ORAL EVERY 8 HOURS SCHEDULED
Status: DISCONTINUED | OUTPATIENT
Start: 2023-10-27 | End: 2023-10-28 | Stop reason: HOSPADM

## 2023-10-27 RX ORDER — INSULIN GLARGINE 100 [IU]/ML
10 INJECTION, SOLUTION SUBCUTANEOUS NIGHTLY
Status: DISCONTINUED | OUTPATIENT
Start: 2023-10-27 | End: 2023-10-28 | Stop reason: HOSPADM

## 2023-10-27 RX ADMIN — PREGABALIN 100 MG: 100 CAPSULE ORAL at 09:18

## 2023-10-27 RX ADMIN — CLOPIDOGREL BISULFATE 75 MG: 75 TABLET ORAL at 09:14

## 2023-10-27 RX ADMIN — HYDRALAZINE HYDROCHLORIDE 50 MG: 50 TABLET, FILM COATED ORAL at 20:42

## 2023-10-27 RX ADMIN — SODIUM CHLORIDE, PRESERVATIVE FREE 10 ML: 5 INJECTION INTRAVENOUS at 20:43

## 2023-10-27 RX ADMIN — INSULIN GLARGINE 10 UNITS: 100 INJECTION, SOLUTION SUBCUTANEOUS at 20:55

## 2023-10-27 RX ADMIN — ATORVASTATIN CALCIUM 80 MG: 80 TABLET, FILM COATED ORAL at 20:42

## 2023-10-27 RX ADMIN — ASPIRIN 81 MG: 81 TABLET, COATED ORAL at 09:14

## 2023-10-27 RX ADMIN — GUAIFENESIN 600 MG: 600 TABLET, EXTENDED RELEASE ORAL at 09:14

## 2023-10-27 RX ADMIN — CARVEDILOL 6.25 MG: 6.25 TABLET, FILM COATED ORAL at 17:48

## 2023-10-27 RX ADMIN — HYDRALAZINE HYDROCHLORIDE 25 MG: 25 TABLET, FILM COATED ORAL at 06:08

## 2023-10-27 RX ADMIN — PREGABALIN 100 MG: 100 CAPSULE ORAL at 20:42

## 2023-10-27 RX ADMIN — GUAIFENESIN 600 MG: 600 TABLET, EXTENDED RELEASE ORAL at 20:42

## 2023-10-27 RX ADMIN — GLIPIZIDE 5 MG: 5 TABLET ORAL at 15:38

## 2023-10-27 RX ADMIN — GLIPIZIDE 5 MG: 5 TABLET ORAL at 06:08

## 2023-10-27 RX ADMIN — HYDRALAZINE HYDROCHLORIDE 25 MG: 25 TABLET, FILM COATED ORAL at 15:38

## 2023-10-27 RX ADMIN — CARVEDILOL 6.25 MG: 6.25 TABLET, FILM COATED ORAL at 09:14

## 2023-10-27 RX ADMIN — AMLODIPINE BESYLATE 10 MG: 10 TABLET ORAL at 09:14

## 2023-10-27 NOTE — PROGRESS NOTES
[address] [facility] [place]   OCCUPATIONAL THERAPY MISSED TREATMENT NOTE   INPATIENT   Date: 10/27/23  Patient Name: Nichelle Hill       Room:   MRN: [id number]   Account #: [de-identified]    : 1940  (82 y.o.)  Gender: female   Referring Practitioner: MIRA Devries NP  Diagnosis: Hypoglycemia; Other chest pain; Elevated troponin; hypertensive urgency; Fall, initial encounter           REASON FOR MISSED TREATMENT:  Patient declined   -    Pt sleeping soundly upon arrival, writer easily woke pt to offer therapy this date. Pt politely declined, requesting to rest and sleep due to lots of visitors earlier this date. Encouragement provided, pt agreeable to be rechecked later this date as able. OT attempt at 1413.         Electronically signed by HEIKE Ruiz on 10/27/23 at 2:30 PM EDT

## 2023-10-27 NOTE — PROGRESS NOTES
10/23/2023  No radiographic evidence of acute thoracic injury. CT CERVICAL SPINE WO CONTRAST    Result Date: 10/23/2023  1. No acute fracture or subluxation of the cervical spine is identified. 2. Multilevel degenerative changes of the cervical spine, most notably at C6-C7 where there is degenerative disc disease with extensive adjacent endplate changes. Sequelae of prior infection cannot be entirely excluded at this level. 3. Mild grade 1 anterolisthesis of C3 on C4.     CT HEAD WO CONTRAST    Result Date: 10/23/2023  1. No evidence of acute calvarial fracture or intracranial hemorrhage. 2. Mild age related involutional changes, as above. Assessment :      Hospital Problems             Last Modified POA    * (Principal) Hypoglycemia 10/24/2023 Yes    Cerebrovascular accident (CVA) ([address]) 10/25/2023 Yes    Elevated troponin 10/24/2023 Yes    Hypertensive urgency 10/24/2023 Yes    Abnormal cardiovascular stress test 10/26/2023 Yes     Plan:     Patient status inpatient in the Progressive Unit/Step down    -82year-old pleasant -American lady with history of smoking coronary disease peripheral vascular disease status post bilateral above-knee amputation history of diabetes mellitus and glaucoma presented with a fall from a wheelchair with dizziness found to have severe hypoglycemia blood sugar 15 she is also noted to have bradycardia with heart rate in 50s  This morning patient complained of left-sided weakness minimal drift was noted initially by the nurse but on my examination there was no motor or sensory deficit noted I discussed the case with the neuro interventional neurologist CT head was done which was negative clinically I do not feel there is a stroke or TIA no need for MRI  Diabetic diet Home medications started  OT PT  CT neck right  Severe stenosis of the right paraclinoid ICA.    Patient is getting an MRI today and a stress test if negative discharge planning no neurological deficit noted  Outpatient neuro intervention  10/27  Patient is subacute stroke, on dual antiplatelet, Lipitor  Contrast-induced nephropathy, creatinine improving, nephrology has seen the patient  No more episode of hypoglycemia, NPH is kept on hold  Readings of high blood sugars, starting patient on Lantus 10 unit nightly  Readings of high blood pressure increasing hydralazine to 50  Review PAD s/p bilateral amputation  Overall prognosis guarded  We will request palliative care  DC plan soon  Consultations:   IP CONSULT TO CARDIOLOGY  IP CONSULT TO VASCULAR SURGERY  IP CONSULT TO NEUROLOGY  IP CONSULT TO NEPHROLOGY     Patient is admitted as inpatient status because of co-morbidities listed above, severity of signs and symptoms as outlined, requirement for current medical therapies and most importantly because of direct risk to patient if care not provided in a hospital setting. Expected length of stay > 48 hours. Dinora Hernandez MD  10/27/2023  4:14 PM    Copy sent to Dr. Galindo primary care provider on file. Please note that this chart was generated using voice recognition Dragon dictation software. Although every effort was made to ensure the accuracy of this automated transcription, some errors in transcription may have occurred. none

## 2023-10-27 NOTE — PROGRESS NOTES
Physical Therapy        Physical Therapy Cancel Note      DATE: 10/27/2023    NAME: Luzma Graham  MRN: [id number]   : 1940      Patient not seen this date for Physical Therapy due to:    Patient Declined: Patient sleeping upon approach and could not be aroused from [address] [place] [address]  Will continue to follow for future PT needs.       Electronically signed by Craig Chase PTA on 10/27/2023 at 2:17 PM

## 2023-10-28 VITALS
WEIGHT: 155.2 LBS | SYSTOLIC BLOOD PRESSURE: 163 MMHG | OXYGEN SATURATION: 97 % | BODY MASS INDEX: 35.92 KG/M2 | RESPIRATION RATE: 19 BRPM | DIASTOLIC BLOOD PRESSURE: 47 MMHG | HEART RATE: 66 BPM | HEIGHT: 55 IN | TEMPERATURE: 98.6 F

## 2023-10-28 LAB
GLUCOSE BLD-MCNC: 199 MG/DL (ref 65–105)
GLUCOSE BLD-MCNC: 228 MG/DL (ref 65–105)
GLUCOSE BLD-MCNC: 244 MG/DL (ref 65–105)

## 2023-10-28 PROCEDURE — 6370000000 HC RX 637 (ALT 250 FOR IP): Performed by: NURSE PRACTITIONER

## 2023-10-28 PROCEDURE — 6370000000 HC RX 637 (ALT 250 FOR IP): Performed by: INTERNAL MEDICINE

## 2023-10-28 PROCEDURE — 99239 HOSP IP/OBS DSCHRG MGMT >30: CPT | Performed by: INTERNAL MEDICINE

## 2023-10-28 PROCEDURE — 6360000002 HC RX W HCPCS: Performed by: INTERNAL MEDICINE

## 2023-10-28 RX ORDER — LOSARTAN POTASSIUM 50 MG/1
50 TABLET ORAL DAILY
Status: DISCONTINUED | OUTPATIENT
Start: 2023-10-28 | End: 2023-10-28 | Stop reason: HOSPADM

## 2023-10-28 RX ORDER — HYDROCHLOROTHIAZIDE 25 MG/1
25 TABLET ORAL DAILY
Status: DISCONTINUED | OUTPATIENT
Start: 2023-10-28 | End: 2023-10-28 | Stop reason: HOSPADM

## 2023-10-28 RX ORDER — CARVEDILOL 6.25 MG/1
6.25 TABLET ORAL 2 TIMES DAILY WITH MEALS
Qty: 60 TABLET | Refills: 3 | Status: SHIPPED
Start: 2023-10-28 | End: 2023-11-03 | Stop reason: DRUGHIGH

## 2023-10-28 RX ORDER — LOSARTAN POTASSIUM 50 MG/1
50 TABLET ORAL DAILY
Qty: 30 TABLET | Refills: 3 | Status: SHIPPED | OUTPATIENT
Start: 2023-10-28 | End: 2023-10-28 | Stop reason: SDUPTHER

## 2023-10-28 RX ORDER — GLIPIZIDE 5 MG/1
5 TABLET ORAL
Qty: 60 TABLET | Refills: 3 | Status: SHIPPED | OUTPATIENT
Start: 2023-10-28

## 2023-10-28 RX ORDER — GLIPIZIDE 5 MG/1
5 TABLET ORAL
Qty: 60 TABLET | Refills: 3 | Status: SHIPPED | OUTPATIENT
Start: 2023-10-28 | End: 2023-10-28 | Stop reason: SDUPTHER

## 2023-10-28 RX ORDER — HYDRALAZINE HYDROCHLORIDE 50 MG/1
50 TABLET, FILM COATED ORAL EVERY 8 HOURS SCHEDULED
Qty: 90 TABLET | Refills: 3 | Status: SHIPPED | OUTPATIENT
Start: 2023-10-28 | End: 2023-10-28 | Stop reason: SDUPTHER

## 2023-10-28 RX ORDER — CLOPIDOGREL BISULFATE 75 MG/1
75 TABLET ORAL DAILY
Qty: 89 TABLET | Refills: 0 | Status: SHIPPED | OUTPATIENT
Start: 2023-10-28 | End: 2023-10-28 | Stop reason: SDUPTHER

## 2023-10-28 RX ORDER — CLOPIDOGREL BISULFATE 75 MG/1
75 TABLET ORAL DAILY
Qty: 89 TABLET | Refills: 0 | Status: SHIPPED | OUTPATIENT
Start: 2023-10-28 | End: 2024-01-25

## 2023-10-28 RX ORDER — CARVEDILOL 6.25 MG/1
6.25 TABLET ORAL 2 TIMES DAILY WITH MEALS
Qty: 60 TABLET | Refills: 3 | Status: SHIPPED | OUTPATIENT
Start: 2023-10-28 | End: 2023-10-28 | Stop reason: SDUPTHER

## 2023-10-28 RX ORDER — LOSARTAN POTASSIUM 50 MG/1
50 TABLET ORAL DAILY
Qty: 30 TABLET | Refills: 3 | Status: SHIPPED | OUTPATIENT
Start: 2023-10-28

## 2023-10-28 RX ORDER — HYDRALAZINE HYDROCHLORIDE 50 MG/1
50 TABLET, FILM COATED ORAL EVERY 8 HOURS SCHEDULED
Qty: 90 TABLET | Refills: 3 | Status: SHIPPED | OUTPATIENT
Start: 2023-10-28 | End: 2023-11-03 | Stop reason: SDUPTHER

## 2023-10-28 RX ORDER — ATORVASTATIN CALCIUM 80 MG/1
40 TABLET, FILM COATED ORAL NIGHTLY
Qty: 30 TABLET | Refills: 2 | Status: SHIPPED | OUTPATIENT
Start: 2023-10-28 | End: 2023-11-03

## 2023-10-28 RX ORDER — INSULIN ASPART 100 [IU]/ML
8 INJECTION, SUSPENSION SUBCUTANEOUS 2 TIMES DAILY WITH MEALS
Qty: 5 ADJUSTABLE DOSE PRE-FILLED PEN SYRINGE | Refills: 3 | Status: SHIPPED | OUTPATIENT
Start: 2023-10-28

## 2023-10-28 RX ADMIN — AMLODIPINE BESYLATE 10 MG: 10 TABLET ORAL at 09:02

## 2023-10-28 RX ADMIN — ASPIRIN 81 MG: 81 TABLET, COATED ORAL at 09:02

## 2023-10-28 RX ADMIN — ENOXAPARIN SODIUM 30 MG: 100 INJECTION SUBCUTANEOUS at 09:02

## 2023-10-28 RX ADMIN — HYDRALAZINE HYDROCHLORIDE 50 MG: 50 TABLET, FILM COATED ORAL at 04:33

## 2023-10-28 RX ADMIN — LOSARTAN POTASSIUM 50 MG: 50 TABLET, FILM COATED ORAL at 13:13

## 2023-10-28 RX ADMIN — HYDROCHLOROTHIAZIDE 25 MG: 25 TABLET ORAL at 13:13

## 2023-10-28 RX ADMIN — GUAIFENESIN 600 MG: 600 TABLET, EXTENDED RELEASE ORAL at 09:02

## 2023-10-28 RX ADMIN — CLOPIDOGREL BISULFATE 75 MG: 75 TABLET ORAL at 09:02

## 2023-10-28 RX ADMIN — PREGABALIN 100 MG: 100 CAPSULE ORAL at 09:02

## 2023-10-28 RX ADMIN — GLIPIZIDE 5 MG: 5 TABLET ORAL at 05:51

## 2023-10-28 RX ADMIN — CARVEDILOL 6.25 MG: 6.25 TABLET, FILM COATED ORAL at 09:02

## 2023-10-28 NOTE — CARE COORDINATION
DISCHARGE PLANNING NOTE:    This writer spoke with the patient at bedside. Patient states the plan is to return home with family to help. Patient stated that she has an aide that assists three days per week through [employer] [facility] HHA/Nursing. Patient is not current with them. Unsure of whom she is current with, therefore AYAN can not be faxed. Patient's family at bedside and able to assist with home needs. IMM letter provided to patient. Patient offered four hours to make informed decision regarding appeal process; patient agreeable to discharge.      Electronically signed by Charla Silverio RN on 10/28/2023 at 3:11 PM
ONGOING DISCHARGE PLAN:    Patient is alert and oriented x4. Spoke with patient regarding discharge plan and patient confirms that plan is still to return to home to her independent living. Pt. States Josr Fong has HHA- 3 X a week/Nursing, from [facility]\". Writer left  to confirm. [phone]. PT/OT on board. Pt. Is Antoni leg Amputee. Writer was informed, By Therapy, That Pt Has Old WC at home & may Benefit from [address] [place] [address]. Writer spoke to [employer], from [employer], [phone]. Writer faxed Face Sheet/Demo,  H & P, Progress notes, OT notes , No PT notes available,to [phone] & he can follow with this as an outpt basis. Writer placed his Information on AVS, for pt to call & follow. Cardio, Stress, Echo,     Neuro, MRI. Will continue to follow for additional discharge needs. If patient is discharged prior to next notation, then this note serves as note for discharge by case management.     Electronically signed by Rosalba Cheadle, RN on 10/25/2023 at 3:40 PM
ONGOING DISCHARGE PLAN:    Patient is alert and oriented x4. Spoke with patient regarding discharge plan and patient confirms that plan is undecided at this time. Pt. Lives alone in her apt. States \"is current w/ [facility] for HHA/Nursing. Writer called 2 agencies, they are NOT current w/ Her. Pt. Does NOT have Number. Pt. States She is Devante De La Torre is she will be staying w/ her Daughter Or If Family will be staying with her\". Per Nursing, Plan is for Cardiac Cath, Most likely on Monday. 'Jesus Pillai, From [facility], [phone] is following for Possible Custom Power chair as outpt. Will continue to follow for additional discharge needs. If patient is discharged prior to next notation, then this note serves as note for discharge by case management.     Electronically signed by Adria Angulo RN on 10/27/2023 at 1:40 PM
Patient and/or primary caregiver participated in post-hospital care planning and their ability to address care needs was assessed. Yes    Family/caregiver is willing and able to care for patient at home. Yes    Family/caregiver educated on resources available including durable medical equipment and respite care. Yes     [address] will follow as outpt for Custom Power Chair for home.
Patient   Patient Representative Name:       The Patient and/or Patient Representative Agree with the Discharge Plan?  Yes    Benja Olivier RN  Case Management Department  Ph: [phone] Fax: [phone]

## 2023-10-28 NOTE — PROGRESS NOTES
improving, nephrology has seen the patient  No more episode of hypoglycemia, NPH is kept on hold  Readings of high blood sugars, starting patient on Lantus 10 unit nightly  Readings of high blood pressure increasing hydralazine to 50  Review PAD s/p bilateral amputation  Overall prognosis guarded  We will request palliative care  DC plan soon    10/28   Patient, blood sugars better controlled  Blood pressure, still running high, adding Cozaar and hydrochlorothiazide  Creatinine is stable  DC plan  Need to follow-up with cardio and neuro as outpatient  Discussed with RN, if blood pressure get better will discharge home  Consultations:   [facility]  IP CONSULT  [address]     Patient is admitted as inpatient status because of co-morbidities listed above, severity of signs and symptoms as outlined, requirement for current medical therapies and most importantly because of direct risk to patient if care not provided in a hospital setting. Expected length of stay > 48 hours. Ifeanyi Hoyos MD  10/28/2023  12:36 PM    Copy sent to Dr. Galindo primary care provider on file. Please note that this chart was generated using voice recognition Dragon dictation software. Although every effort was made to ensure the accuracy of this automated transcription, some errors in transcription may have occurred.

## 2023-10-28 NOTE — PLAN OF CARE
Problem: Discharge Planning  Goal: Discharge to home or other facility with appropriate resources  10/28/2023 1324 by Nia Dihn RN  Outcome: Adequate for Discharge     Problem: Safety - Adult  Goal: Free from fall injury  10/28/2023 1324 by Nia Dinh RN  Outcome: Adequate for Discharge

## 2023-10-28 NOTE — PROGRESS NOTES
Discussed personal risk factors for Stroke /TIA with patient/family, and ways to reduce the risk for a new or recurrent stroke. Patient's personal risk factors which were identified are diabetes mellitus, hyperlipidemia, and hypertension    Advised pt. that you can reduce your risk for stroke/TIA by modifying/controlling the risk factors that you have. Pt.advised to take the medications as prescribed, which will be detailed in the discharge instructions, and to not stop taking them without consulting their physician. In addition, pt. advised to maintain a healthy diet, exercise regularly and to not smoke. Supplemental written material provided to pt. by way of handouts, addressing all signs & symptoms of a stroke, which are : sudden numbness or weakness especially on one side of the body, sudden confusion ,difficulty speaking or understanding, sudden loss of vision , sudden dizziness or loss of balance/ coordination, or sudden severe headache. Explained the need to call EMS  (911) immediately if signs & symptoms occur. Also discussed the medications that the pt. is taking, risk factors, and the need for follow-up after discharge.  In addition, provided education on community resources and stroke advocacy

## 2023-10-28 NOTE — CONSULTS
This patient was called to my partner but is inappropriate for vascular surgery. The carotid stenosis noted in the CT scan is intracranial and should be deferred to neuro intervention.     Electronically signed by Beata Dubois MD on 10/28/2023 at 8:14 AM

## 2023-10-28 NOTE — PROGRESS NOTES
Discharge information reviewed with the patient and her daughter. All questions answered. The patient's daughter wheeled her to her car without issue.

## 2023-10-28 NOTE — DISCHARGE INSTR - DIET

## 2023-10-30 NOTE — PROGRESS NOTES
Physician Progress Note      PATIENTFrarmin Lee  [facility]:                  [id number]  :                       1940  ADMIT DATE:       10/23/2023 7:59 PM  [facility] DATE:        10/28/2023 3:35 PM  RESPONDING  PROVIDER #:        Cait Weinstein DO          QUERY TEXT:    Patient admitted with hypoglycemia. Noted documentation of type II MI in   10/24/223 Cardiology consult note. In order to support the diagnosis of type II MI, please refer to 4th universal   definition of MI below and include additional clinical indicators in your   documentation. Or please document if the diagnosis of type II MI has been   ruled out after study. The medical record reflects the following:  Risk Factors: CAD, HTN, HLD, PVD, bilateral leg amputation, DM type II  Clinical Indicators: Per Cardio consult: 'Elevated troponin but flat type 2 MI   ?- stable no chest pain\"; High sensitivity troponins: 32-33-34-36;    10/23/23 EKG: Sinus bradycardia with sinus arrhythmia with 1st degree A-V   block with occasional Premature ventricular complexes Non conducted P , pauses   . 2 seconds Voltage criteria for left ventricular hypertrophy Cannot rule out   Septal infarct (cited on or before 16-MAY-2013);  10/24/23 echo: The left   ventricle appears normal in size, severely increased LV wall thickness, no   obvious wall motion abnormality noted, EF 65-70%  Treatment: ASA, statin    Fourth Universal Definition of Myocardial Infarction:  Clearly separates MI   from myocardial injury. Patients with elevated blood troponin levels but   without clinical evidence of ischemia are said to have had a myocardial   injury. ? To have a myocardial infarction requires both an elevated troponin   blood test along with at least one of the following:  - Symptoms of acute myocardial ischemia (Types 1 - 5 MI)  - Clinical evidence of ischemia, as evidenced in an electrocardiogram (EKG)   showing new ischemic changes (Type 1, Type 2, Type 3, or

## 2023-11-14 ENCOUNTER — HOSPITAL ENCOUNTER (OUTPATIENT)
Age: 83
Discharge: HOME | End: 2023-11-14
Payer: MEDICARE

## 2023-11-14 DIAGNOSIS — N18.30 SECONDARY DIABETES MELLITUS WITH STAGE 3 CHRONIC KIDNEY DISEASE (HCC): ICD-10-CM

## 2023-11-14 DIAGNOSIS — D63.1 ANEMIA IN STAGE 3A CHRONIC KIDNEY DISEASE (HCC): ICD-10-CM

## 2023-11-14 DIAGNOSIS — E87.0 HYPERNATREMIA: ICD-10-CM

## 2023-11-14 DIAGNOSIS — N18.30 BENIGN HYPERTENSION WITH CKD (CHRONIC KIDNEY DISEASE) STAGE III (HCC): ICD-10-CM

## 2023-11-14 DIAGNOSIS — I12.9 BENIGN HYPERTENSION WITH CKD (CHRONIC KIDNEY DISEASE) STAGE III (HCC): ICD-10-CM

## 2023-11-14 DIAGNOSIS — E13.22 SECONDARY DIABETES MELLITUS WITH STAGE 3 CHRONIC KIDNEY DISEASE (HCC): ICD-10-CM

## 2023-11-14 DIAGNOSIS — N18.31 STAGE 3A CHRONIC KIDNEY DISEASE (HCC): ICD-10-CM

## 2023-11-14 DIAGNOSIS — N18.31 ANEMIA IN STAGE 3A CHRONIC KIDNEY DISEASE (HCC): ICD-10-CM

## 2023-11-14 PROBLEM — M54.50 LOW BACK PAIN: Status: ACTIVE | Noted: 2023-08-04

## 2023-11-14 PROBLEM — R53.83 FATIGUE: Status: ACTIVE | Noted: 2023-08-04

## 2023-11-14 PROBLEM — F43.23 ADJUSTMENT DISORDER WITH MIXED ANXIETY AND DEPRESSED MOOD: Status: ACTIVE | Noted: 2023-11-07

## 2023-11-14 PROBLEM — J40 BRONCHITIS: Status: ACTIVE | Noted: 2023-08-04

## 2023-11-14 LAB
ALBUMIN SERPL-MCNC: 3.4 G/DL (ref 3.5–5.2)
ALP SERPL-CCNC: 191 U/L (ref 35–104)
ALT SERPL-CCNC: 10 U/L (ref 5–33)
ANION GAP SERPL CALCULATED.3IONS-SCNC: 12 MMOL/L (ref 9–17)
AST SERPL-CCNC: 15 U/L
BACTERIA URNS QL MICRO: ABNORMAL
BASOPHILS # BLD: 0 K/UL (ref 0–0.2)
BASOPHILS NFR BLD: 1 % (ref 0–2)
BILIRUB SERPL-MCNC: 0.4 MG/DL (ref 0.3–1.2)
BILIRUB UR QL STRIP: NEGATIVE
BUN SERPL-MCNC: 22 MG/DL (ref 8–23)
CALCIUM SERPL-MCNC: 9.7 MG/DL (ref 8.6–10.4)
CASTS #/AREA URNS LPF: ABNORMAL /LPF
CHLORIDE SERPL-SCNC: 103 MMOL/L (ref 98–107)
CLARITY UR: ABNORMAL
CO2 SERPL-SCNC: 28 MMOL/L (ref 20–31)
COLOR UR: YELLOW
CREAT SERPL-MCNC: 1.4 MG/DL (ref 0.5–0.9)
EOSINOPHIL # BLD: 0.1 K/UL (ref 0–0.4)
EOSINOPHILS RELATIVE PERCENT: 2 % (ref 0–4)
EPI CELLS #/AREA URNS HPF: ABNORMAL /HPF
ERYTHROCYTE [DISTWIDTH] IN BLOOD BY AUTOMATED COUNT: 14 % (ref 11.5–14.9)
GFR SERPL CREATININE-BSD FRML MDRD: 38 ML/MIN/1.73M2
GLUCOSE SERPL-MCNC: 205 MG/DL (ref 70–99)
GLUCOSE UR STRIP-MCNC: ABNORMAL MG/DL
HCT VFR BLD AUTO: 39.9 % (ref 36–46)
HGB BLD-MCNC: 13.2 G/DL (ref 12–16)
HGB UR QL STRIP.AUTO: NEGATIVE
KETONES UR STRIP-MCNC: NEGATIVE MG/DL
LEUKOCYTE ESTERASE UR QL STRIP: NEGATIVE
LYMPHOCYTES NFR BLD: 1.8 K/UL (ref 1–4.8)
LYMPHOCYTES RELATIVE PERCENT: 30 % (ref 24–44)
MAGNESIUM SERPL-MCNC: 2.5 MG/DL (ref 1.6–2.6)
MCH RBC QN AUTO: 29.8 PG (ref 26–34)
MCHC RBC AUTO-ENTMCNC: 33.1 G/DL (ref 31–37)
MCV RBC AUTO: 90.2 FL (ref 80–100)
MONOCYTES NFR BLD: 0.5 K/UL (ref 0.1–1.3)
MONOCYTES NFR BLD: 9 % (ref 1–7)
NEUTROPHILS NFR BLD: 58 % (ref 36–66)
NEUTS SEG NFR BLD: 3.6 K/UL (ref 1.3–9.1)
NITRITE UR QL STRIP: NEGATIVE
PH UR STRIP: 6.5 [PH] (ref 5–8)
PHOSPHATE SERPL-MCNC: 4 MG/DL (ref 2.6–4.5)
PLATELET # BLD AUTO: 266 K/UL (ref 150–450)
PMV BLD AUTO: 8.6 FL (ref 6–12)
POTASSIUM SERPL-SCNC: 4 MMOL/L (ref 3.7–5.3)
PROT SERPL-MCNC: 7.5 G/DL (ref 6.4–8.3)
PROT UR STRIP-MCNC: ABNORMAL MG/DL
RBC # BLD AUTO: 4.42 M/UL (ref 4–5.2)
RBC #/AREA URNS HPF: ABNORMAL /HPF
SODIUM SERPL-SCNC: 143 MMOL/L (ref 135–144)
SP GR UR STRIP: 1.02 (ref 1–1.03)
UROBILINOGEN UR STRIP-ACNC: NORMAL EU/DL (ref 0–1)
WBC #/AREA URNS HPF: ABNORMAL /HPF
WBC OTHER # BLD: 6.1 K/UL (ref 3.5–11)

## 2023-11-14 PROCEDURE — 83735 ASSAY OF MAGNESIUM: CPT

## 2023-11-14 PROCEDURE — 81001 URINALYSIS AUTO W/SCOPE: CPT

## 2023-11-14 PROCEDURE — 84100 ASSAY OF PHOSPHORUS: CPT

## 2023-11-14 PROCEDURE — 85025 COMPLETE CBC W/AUTO DIFF WBC: CPT

## 2023-11-14 PROCEDURE — 80053 COMPREHEN METABOLIC PANEL: CPT

## 2023-11-14 PROCEDURE — 36415 COLL VENOUS BLD VENIPUNCTURE: CPT

## 2023-11-19 ENCOUNTER — APPOINTMENT (OUTPATIENT)
Dept: GENERAL RADIOLOGY | Age: 83
DRG: 372 | End: 2023-11-19
Payer: MEDICARE

## 2023-11-19 ENCOUNTER — APPOINTMENT (OUTPATIENT)
Dept: CT IMAGING | Age: 83
DRG: 372 | End: 2023-11-19
Payer: MEDICARE

## 2023-11-19 ENCOUNTER — HOSPITAL ENCOUNTER (INPATIENT)
Age: 83
LOS: 3 days | Discharge: HOME | DRG: 372 | End: 2023-11-22
Attending: EMERGENCY MEDICINE | Admitting: INTERNAL MEDICINE
Payer: MEDICARE

## 2023-11-19 DIAGNOSIS — K52.9 COLITIS: Primary | ICD-10-CM

## 2023-11-19 LAB
ALBUMIN SERPL-MCNC: 3.6 G/DL (ref 3.5–5.2)
ALP SERPL-CCNC: 197 U/L (ref 35–104)
ALT SERPL-CCNC: 7 U/L (ref 5–33)
ANION GAP SERPL CALCULATED.3IONS-SCNC: 13 MMOL/L (ref 9–17)
AST SERPL-CCNC: 13 U/L
BACTERIA URNS QL MICRO: ABNORMAL
BASOPHILS # BLD: 0 K/UL (ref 0–0.2)
BASOPHILS NFR BLD: 0 % (ref 0–2)
BILIRUB SERPL-MCNC: 0.3 MG/DL (ref 0.3–1.2)
BILIRUB UR QL STRIP: NEGATIVE
BNP SERPL-MCNC: 436 PG/ML
BUN SERPL-MCNC: 30 MG/DL (ref 8–23)
CALCIUM SERPL-MCNC: 9.7 MG/DL (ref 8.6–10.4)
CASTS #/AREA URNS LPF: ABNORMAL /LPF
CHLORIDE SERPL-SCNC: 99 MMOL/L (ref 98–107)
CLARITY UR: CLEAR
CO2 SERPL-SCNC: 27 MMOL/L (ref 20–31)
COLOR UR: YELLOW
CREAT SERPL-MCNC: 1.7 MG/DL (ref 0.5–0.9)
EOSINOPHIL # BLD: 0.1 K/UL (ref 0–0.4)
EOSINOPHILS RELATIVE PERCENT: 1 % (ref 0–4)
EPI CELLS #/AREA URNS HPF: ABNORMAL /HPF
ERYTHROCYTE [DISTWIDTH] IN BLOOD BY AUTOMATED COUNT: 14.2 % (ref 11.5–14.9)
GFR SERPL CREATININE-BSD FRML MDRD: 30 ML/MIN/1.73M2
GLUCOSE BLD-MCNC: 190 MG/DL (ref 65–105)
GLUCOSE SERPL-MCNC: 284 MG/DL (ref 70–99)
GLUCOSE UR STRIP-MCNC: ABNORMAL MG/DL
HCT VFR BLD AUTO: 43.9 % (ref 36–46)
HGB BLD-MCNC: 14.2 G/DL (ref 12–16)
HGB UR QL STRIP.AUTO: NEGATIVE
KETONES UR STRIP-MCNC: NEGATIVE MG/DL
LEUKOCYTE ESTERASE UR QL STRIP: NEGATIVE
LIPASE SERPL-CCNC: 23 U/L (ref 13–60)
LYMPHOCYTES NFR BLD: 1.7 K/UL (ref 1–4.8)
LYMPHOCYTES RELATIVE PERCENT: 14 % (ref 24–44)
MCH RBC QN AUTO: 29 PG (ref 26–34)
MCHC RBC AUTO-ENTMCNC: 32.3 G/DL (ref 31–37)
MCV RBC AUTO: 90 FL (ref 80–100)
MONOCYTES NFR BLD: 0.7 K/UL (ref 0.1–1.3)
MONOCYTES NFR BLD: 5 % (ref 1–7)
NEUTROPHILS NFR BLD: 80 % (ref 36–66)
NEUTS SEG NFR BLD: 9.6 K/UL (ref 1.3–9.1)
NITRITE UR QL STRIP: NEGATIVE
PH UR STRIP: 7 [PH] (ref 5–8)
PLATELET # BLD AUTO: 293 K/UL (ref 150–450)
PMV BLD AUTO: 8.9 FL (ref 6–12)
POTASSIUM SERPL-SCNC: 4 MMOL/L (ref 3.7–5.3)
PROT SERPL-MCNC: 7.6 G/DL (ref 6.4–8.3)
PROT UR STRIP-MCNC: ABNORMAL MG/DL
RBC # BLD AUTO: 4.88 M/UL (ref 4–5.2)
RBC #/AREA URNS HPF: ABNORMAL /HPF
SODIUM SERPL-SCNC: 139 MMOL/L (ref 135–144)
SP GR UR STRIP: 1.02 (ref 1–1.03)
TROPONIN I SERPL HS-MCNC: 33 NG/L (ref 0–14)
TROPONIN I SERPL HS-MCNC: 34 NG/L (ref 0–14)
TSH SERPL DL<=0.05 MIU/L-ACNC: 3.6 UIU/ML (ref 0.3–5)
UROBILINOGEN UR STRIP-ACNC: NORMAL EU/DL (ref 0–1)
WBC #/AREA URNS HPF: ABNORMAL /HPF
WBC OTHER # BLD: 12.1 K/UL (ref 3.5–11)

## 2023-11-19 PROCEDURE — 87086 URINE CULTURE/COLONY COUNT: CPT

## 2023-11-19 PROCEDURE — 36415 COLL VENOUS BLD VENIPUNCTURE: CPT

## 2023-11-19 PROCEDURE — 74176 CT ABD & PELVIS W/O CONTRAST: CPT

## 2023-11-19 PROCEDURE — 6370000000 HC RX 637 (ALT 250 FOR IP): Performed by: EMERGENCY MEDICINE

## 2023-11-19 PROCEDURE — 2060000000 HC ICU INTERMEDIATE R&B

## 2023-11-19 PROCEDURE — 71045 X-RAY EXAM CHEST 1 VIEW: CPT

## 2023-11-19 PROCEDURE — 93005 ELECTROCARDIOGRAM TRACING: CPT | Performed by: EMERGENCY MEDICINE

## 2023-11-19 PROCEDURE — 81001 URINALYSIS AUTO W/SCOPE: CPT

## 2023-11-19 PROCEDURE — 83880 ASSAY OF NATRIURETIC PEPTIDE: CPT

## 2023-11-19 PROCEDURE — 99285 EMERGENCY DEPT VISIT HI MDM: CPT

## 2023-11-19 PROCEDURE — 84484 ASSAY OF TROPONIN QUANT: CPT

## 2023-11-19 PROCEDURE — 2580000003 HC RX 258: Performed by: NURSE PRACTITIONER

## 2023-11-19 PROCEDURE — 83690 ASSAY OF LIPASE: CPT

## 2023-11-19 PROCEDURE — 80053 COMPREHEN METABOLIC PANEL: CPT

## 2023-11-19 PROCEDURE — 84443 ASSAY THYROID STIM HORMONE: CPT

## 2023-11-19 PROCEDURE — 82947 ASSAY GLUCOSE BLOOD QUANT: CPT

## 2023-11-19 PROCEDURE — 6370000000 HC RX 637 (ALT 250 FOR IP): Performed by: NURSE PRACTITIONER

## 2023-11-19 PROCEDURE — 96374 THER/PROPH/DIAG INJ IV PUSH: CPT

## 2023-11-19 PROCEDURE — 2580000003 HC RX 258: Performed by: EMERGENCY MEDICINE

## 2023-11-19 PROCEDURE — 85025 COMPLETE CBC W/AUTO DIFF WBC: CPT

## 2023-11-19 PROCEDURE — 6360000002 HC RX W HCPCS: Performed by: EMERGENCY MEDICINE

## 2023-11-19 RX ORDER — ONDANSETRON 4 MG/1
4 TABLET, ORALLY DISINTEGRATING ORAL EVERY 8 HOURS PRN
Status: DISCONTINUED | OUTPATIENT
Start: 2023-11-19 | End: 2023-11-22 | Stop reason: HOSPADM

## 2023-11-19 RX ORDER — INSULIN LISPRO 100 [IU]/ML
0-8 INJECTION, SOLUTION INTRAVENOUS; SUBCUTANEOUS
Status: DISCONTINUED | OUTPATIENT
Start: 2023-11-20 | End: 2023-11-22 | Stop reason: HOSPADM

## 2023-11-19 RX ORDER — LOSARTAN POTASSIUM 50 MG/1
50 TABLET ORAL DAILY
Status: DISCONTINUED | OUTPATIENT
Start: 2023-11-20 | End: 2023-11-22

## 2023-11-19 RX ORDER — METRONIDAZOLE 500 MG/100ML
500 INJECTION, SOLUTION INTRAVENOUS EVERY 8 HOURS
Status: DISCONTINUED | OUTPATIENT
Start: 2023-11-20 | End: 2023-11-22 | Stop reason: HOSPADM

## 2023-11-19 RX ORDER — INSULIN LISPRO 100 [IU]/ML
0-4 INJECTION, SOLUTION INTRAVENOUS; SUBCUTANEOUS NIGHTLY
Status: DISCONTINUED | OUTPATIENT
Start: 2023-11-19 | End: 2023-11-22 | Stop reason: HOSPADM

## 2023-11-19 RX ORDER — SODIUM CHLORIDE 0.9 % (FLUSH) 0.9 %
5-40 SYRINGE (ML) INJECTION EVERY 12 HOURS SCHEDULED
Status: DISCONTINUED | OUTPATIENT
Start: 2023-11-19 | End: 2023-11-22 | Stop reason: HOSPADM

## 2023-11-19 RX ORDER — CARVEDILOL 3.12 MG/1
3.12 TABLET ORAL 2 TIMES DAILY WITH MEALS
Status: DISCONTINUED | OUTPATIENT
Start: 2023-11-20 | End: 2023-11-22

## 2023-11-19 RX ORDER — CLOPIDOGREL BISULFATE 75 MG/1
75 TABLET ORAL DAILY
Status: DISCONTINUED | OUTPATIENT
Start: 2023-11-20 | End: 2023-11-22 | Stop reason: HOSPADM

## 2023-11-19 RX ORDER — SODIUM CHLORIDE 9 MG/ML
INJECTION, SOLUTION INTRAVENOUS PRN
Status: DISCONTINUED | OUTPATIENT
Start: 2023-11-19 | End: 2023-11-22 | Stop reason: HOSPADM

## 2023-11-19 RX ORDER — SODIUM CHLORIDE 9 MG/ML
INJECTION, SOLUTION INTRAVENOUS CONTINUOUS
Status: ACTIVE | OUTPATIENT
Start: 2023-11-19 | End: 2023-11-21

## 2023-11-19 RX ORDER — SODIUM CHLORIDE 0.9 % (FLUSH) 0.9 %
10 SYRINGE (ML) INJECTION PRN
Status: DISCONTINUED | OUTPATIENT
Start: 2023-11-19 | End: 2023-11-22 | Stop reason: HOSPADM

## 2023-11-19 RX ORDER — DORZOLAMIDE HCL 20 MG/ML
1 SOLUTION/ DROPS OPHTHALMIC 2 TIMES DAILY
Status: DISCONTINUED | OUTPATIENT
Start: 2023-11-19 | End: 2023-11-22 | Stop reason: HOSPADM

## 2023-11-19 RX ORDER — HYDROCHLOROTHIAZIDE 25 MG/1
25 TABLET ORAL DAILY
Status: DISCONTINUED | OUTPATIENT
Start: 2023-11-20 | End: 2023-11-22 | Stop reason: HOSPADM

## 2023-11-19 RX ORDER — PREGABALIN 100 MG/1
100 CAPSULE ORAL 2 TIMES DAILY
Status: DISCONTINUED | OUTPATIENT
Start: 2023-11-19 | End: 2023-11-22 | Stop reason: HOSPADM

## 2023-11-19 RX ORDER — ASPIRIN 81 MG/1
81 TABLET ORAL DAILY
Status: DISCONTINUED | OUTPATIENT
Start: 2023-11-20 | End: 2023-11-22 | Stop reason: HOSPADM

## 2023-11-19 RX ORDER — ENOXAPARIN SODIUM 100 MG/ML
30 INJECTION SUBCUTANEOUS DAILY
Status: DISCONTINUED | OUTPATIENT
Start: 2023-11-20 | End: 2023-11-22 | Stop reason: HOSPADM

## 2023-11-19 RX ORDER — DICYCLOMINE HYDROCHLORIDE 10 MG/1
20 CAPSULE ORAL ONCE
Status: COMPLETED | OUTPATIENT
Start: 2023-11-19 | End: 2023-11-19

## 2023-11-19 RX ORDER — METRONIDAZOLE 500 MG/100ML
500 INJECTION, SOLUTION INTRAVENOUS ONCE
Status: COMPLETED | OUTPATIENT
Start: 2023-11-19 | End: 2023-11-19

## 2023-11-19 RX ORDER — AMLODIPINE BESYLATE 10 MG/1
10 TABLET ORAL DAILY
Status: DISCONTINUED | OUTPATIENT
Start: 2023-11-20 | End: 2023-11-22 | Stop reason: HOSPADM

## 2023-11-19 RX ORDER — INSULIN GLARGINE 100 [IU]/ML
9 INJECTION, SOLUTION SUBCUTANEOUS DAILY
Status: DISCONTINUED | OUTPATIENT
Start: 2023-11-20 | End: 2023-11-22 | Stop reason: HOSPADM

## 2023-11-19 RX ORDER — ATORVASTATIN CALCIUM 40 MG/1
40 TABLET, FILM COATED ORAL DAILY
Status: DISCONTINUED | OUTPATIENT
Start: 2023-11-20 | End: 2023-11-22 | Stop reason: HOSPADM

## 2023-11-19 RX ORDER — INSULIN ASPART 100 [IU]/ML
8 INJECTION, SUSPENSION SUBCUTANEOUS 2 TIMES DAILY WITH MEALS
Status: DISCONTINUED | OUTPATIENT
Start: 2023-11-20 | End: 2023-11-19 | Stop reason: CLARIF

## 2023-11-19 RX ORDER — INSULIN LISPRO 100 [IU]/ML
2 INJECTION, SOLUTION INTRAVENOUS; SUBCUTANEOUS
Status: DISCONTINUED | OUTPATIENT
Start: 2023-11-20 | End: 2023-11-20

## 2023-11-19 RX ORDER — DEXTROSE MONOHYDRATE 100 MG/ML
INJECTION, SOLUTION INTRAVENOUS CONTINUOUS PRN
Status: DISCONTINUED | OUTPATIENT
Start: 2023-11-19 | End: 2023-11-22 | Stop reason: HOSPADM

## 2023-11-19 RX ORDER — ACETAMINOPHEN 650 MG/1
650 SUPPOSITORY RECTAL EVERY 6 HOURS PRN
Status: DISCONTINUED | OUTPATIENT
Start: 2023-11-19 | End: 2023-11-22 | Stop reason: HOSPADM

## 2023-11-19 RX ORDER — TIMOLOL MALEATE 5 MG/ML
1 SOLUTION/ DROPS OPHTHALMIC 2 TIMES DAILY
Status: DISCONTINUED | OUTPATIENT
Start: 2023-11-19 | End: 2023-11-22 | Stop reason: HOSPADM

## 2023-11-19 RX ORDER — ONDANSETRON 2 MG/ML
4 INJECTION INTRAMUSCULAR; INTRAVENOUS EVERY 6 HOURS PRN
Status: DISCONTINUED | OUTPATIENT
Start: 2023-11-19 | End: 2023-11-22 | Stop reason: HOSPADM

## 2023-11-19 RX ORDER — ACETAMINOPHEN 325 MG/1
650 TABLET ORAL EVERY 6 HOURS PRN
Status: DISCONTINUED | OUTPATIENT
Start: 2023-11-19 | End: 2023-11-22 | Stop reason: HOSPADM

## 2023-11-19 RX ADMIN — CEFTRIAXONE SODIUM 1000 MG: 1 INJECTION, POWDER, FOR SOLUTION INTRAMUSCULAR; INTRAVENOUS at 19:01

## 2023-11-19 RX ADMIN — METRONIDAZOLE 500 MG: 500 INJECTION, SOLUTION INTRAVENOUS at 20:04

## 2023-11-19 RX ADMIN — HYDRALAZINE HYDROCHLORIDE 75 MG: 50 TABLET, FILM COATED ORAL at 23:02

## 2023-11-19 RX ADMIN — SODIUM CHLORIDE: 9 INJECTION, SOLUTION INTRAVENOUS at 23:09

## 2023-11-19 RX ADMIN — DICYCLOMINE HYDROCHLORIDE 20 MG: 10 CAPSULE ORAL at 18:53

## 2023-11-19 RX ADMIN — SODIUM CHLORIDE, PRESERVATIVE FREE 10 ML: 5 INJECTION INTRAVENOUS at 23:02

## 2023-11-19 ASSESSMENT — PAIN DESCRIPTION - LOCATION: LOCATION: ABDOMEN

## 2023-11-19 ASSESSMENT — PAIN DESCRIPTION - PAIN TYPE: TYPE: ACUTE PAIN

## 2023-11-19 ASSESSMENT — PAIN DESCRIPTION - ORIENTATION: ORIENTATION: RIGHT;UPPER

## 2023-11-19 ASSESSMENT — PAIN DESCRIPTION - ONSET: ONSET: SUDDEN

## 2023-11-19 ASSESSMENT — PAIN DESCRIPTION - FREQUENCY: FREQUENCY: INTERMITTENT

## 2023-11-19 ASSESSMENT — ENCOUNTER SYMPTOMS
ABDOMINAL PAIN: 1
SHORTNESS OF BREATH: 0

## 2023-11-19 ASSESSMENT — PAIN SCALES - GENERAL: PAINLEVEL_OUTOF10: 7

## 2023-11-19 ASSESSMENT — PAIN DESCRIPTION - DESCRIPTORS: DESCRIPTORS: SQUEEZING

## 2023-11-19 NOTE — ED PROVIDER NOTES
[address]  Emergency Department Encounter  Emergency Medicine Resident     Pt Name:Juliet Lacey Officer  MRN: [id number]  [address] 1940  Date of evaluation: 11/19/23  PCP:  Esequiel Moya MD  Note Started: 4:35 PM EST      CHIEF COMPLAINT       Chief Complaint   Patient presents with    Fatigue       HISTORY OF PRESENT ILLNESS  (Location/Symptom, Timing/Onset, Context/Setting, Quality, Duration, Modifying Factors, Severity.)      Sarah Parry is a 82 y.o. female who presents with weakness. Patient states that for the past couple days she has been feeling very weak. Patient also states that she has been having abdominal pain. Patient denies any chest pain, recent illnesses, difficulty breathing, fevers, nausea, vomiting. PAST MEDICAL / SURGICAL / SOCIAL / FAMILY HISTORY      has a past medical history of Amputation of leg ([address]), CAD (coronary artery disease), CAD (coronary artery disease), Chronic back pain, Coughing, Depression, Diabetes, Diphtheria, Full dentures, GERD (gastroesophageal reflux disease), Glaucoma, Headache(784.0), History of blood transfusion, Hx of blood clots, Hyperlipidemia, Hypertension, IDDM (insulin dependent diabetes mellitus), Leg pain, bilateral, LONG TERM ANTICOAGULENT USE, Neuropathy, Numbness of toes, Obesity, Osteoarthritis, Peripheral vascular disease ([address]), Rash, skin, Type II or unspecified type diabetes mellitus without mention of complication, not stated as uncontrolled, Wears dentures, Wears glasses, and Wears glasses. has a past surgical history that includes Bypass Graft (2008); Hysterectomy, total abdominal; Cataract removal (1980); Coronary artery bypass graft (2001); Coronary angioplasty; and Leg amputation through knee (Left).     Social History     Socioeconomic History    Marital status: Single     Spouse name: Not on file    Number of children: Not on file    Years of education: Not on file    Highest education level: Not on file
allergies, medications, social and family history as documented unless otherwise noted below. Documentation of the HPI, Physical Exam and Medical Decision Making performed by medical students or scribes is based on my personal performance of the HPI, PE and MDM. For Phys Assistant/ Nurse Practitioner cases/documentation I have had a face to face evaluation of this patient and have completed at least one if not all key elements of the E/M (history, physical exam, and MDM). Additional findings are as noted.     Katya Baldwin MD  Attending Emergency  Physician                Katya Baldwin MD  11/19/23 2018       Katya Baldwin MD  11/19/23 2053

## 2023-11-20 ENCOUNTER — APPOINTMENT (OUTPATIENT)
Dept: INTERVENTIONAL RADIOLOGY/VASCULAR | Age: 83
DRG: 372 | End: 2023-11-20
Payer: MEDICARE

## 2023-11-20 PROBLEM — I65.21 STENOSIS OF RIGHT INTERNAL CAROTID ARTERY: Status: ACTIVE | Noted: 2023-11-20

## 2023-11-20 PROBLEM — Z86.73 HX OF ARTERIAL ISCHEMIC STROKE: Status: ACTIVE | Noted: 2023-11-20

## 2023-11-20 LAB
ANION GAP SERPL CALCULATED.3IONS-SCNC: 12 MMOL/L (ref 9–17)
BASOPHILS # BLD: 0 K/UL (ref 0–0.2)
BASOPHILS NFR BLD: 0 % (ref 0–2)
BUN SERPL-MCNC: 31 MG/DL (ref 8–23)
CALCIUM SERPL-MCNC: 8.8 MG/DL (ref 8.6–10.4)
CHLORIDE SERPL-SCNC: 106 MMOL/L (ref 98–107)
CO2 SERPL-SCNC: 26 MMOL/L (ref 20–31)
CREAT SERPL-MCNC: 1.7 MG/DL (ref 0.5–0.9)
EKG ATRIAL RATE: 66 BPM
EKG P AXIS: 45 DEGREES
EKG Q-T INTERVAL: 424 MS
EKG QRS DURATION: 74 MS
EKG QTC CALCULATION (BAZETT): 444 MS
EKG R AXIS: -14 DEGREES
EKG T AXIS: 2 DEGREES
EKG VENTRICULAR RATE: 66 BPM
EOSINOPHIL # BLD: 0.1 K/UL (ref 0–0.4)
EOSINOPHILS RELATIVE PERCENT: 1 % (ref 0–4)
ERYTHROCYTE [DISTWIDTH] IN BLOOD BY AUTOMATED COUNT: 13.9 % (ref 11.5–14.9)
GFR SERPL CREATININE-BSD FRML MDRD: 30 ML/MIN/1.73M2
GLUCOSE BLD-MCNC: 105 MG/DL (ref 65–105)
GLUCOSE BLD-MCNC: 179 MG/DL (ref 65–105)
GLUCOSE BLD-MCNC: 99 MG/DL (ref 65–105)
GLUCOSE SERPL-MCNC: 207 MG/DL (ref 70–99)
HCT VFR BLD AUTO: 37.1 % (ref 36–46)
HGB BLD-MCNC: 12.1 G/DL (ref 12–16)
LYMPHOCYTES NFR BLD: 1.8 K/UL (ref 1–4.8)
LYMPHOCYTES RELATIVE PERCENT: 16 % (ref 24–44)
MCH RBC QN AUTO: 29 PG (ref 26–34)
MCHC RBC AUTO-ENTMCNC: 32.6 G/DL (ref 31–37)
MCV RBC AUTO: 89 FL (ref 80–100)
MICROORGANISM SPEC CULT: NO GROWTH
MONOCYTES NFR BLD: 0.8 K/UL (ref 0.1–1.3)
MONOCYTES NFR BLD: 7 % (ref 1–7)
NEUTROPHILS NFR BLD: 76 % (ref 36–66)
NEUTS SEG NFR BLD: 8.6 K/UL (ref 1.3–9.1)
PLATELET # BLD AUTO: 270 K/UL (ref 150–450)
PMV BLD AUTO: 9.1 FL (ref 6–12)
POTASSIUM SERPL-SCNC: 3.7 MMOL/L (ref 3.7–5.3)
RBC # BLD AUTO: 4.17 M/UL (ref 4–5.2)
SODIUM SERPL-SCNC: 144 MMOL/L (ref 135–144)
SPECIMEN DESCRIPTION: NORMAL
WBC OTHER # BLD: 11.4 K/UL (ref 3.5–11)

## 2023-11-20 PROCEDURE — 82947 ASSAY GLUCOSE BLOOD QUANT: CPT

## 2023-11-20 PROCEDURE — 2709999900 IR ULTRASOUND GUIDANCE VASCULAR ACCESS

## 2023-11-20 PROCEDURE — 05H733Z INSERTION OF INFUSION DEVICE INTO RIGHT AXILLARY VEIN, PERCUTANEOUS APPROACH: ICD-10-PCS | Performed by: INTERNAL MEDICINE

## 2023-11-20 PROCEDURE — 76937 US GUIDE VASCULAR ACCESS: CPT

## 2023-11-20 PROCEDURE — 99223 1ST HOSP IP/OBS HIGH 75: CPT | Performed by: INTERNAL MEDICINE

## 2023-11-20 PROCEDURE — 36410 VNPNXR 3YR/> PHY/QHP DX/THER: CPT

## 2023-11-20 PROCEDURE — 85025 COMPLETE CBC W/AUTO DIFF WBC: CPT

## 2023-11-20 PROCEDURE — 6370000000 HC RX 637 (ALT 250 FOR IP): Performed by: NURSE PRACTITIONER

## 2023-11-20 PROCEDURE — 2060000000 HC ICU INTERMEDIATE R&B

## 2023-11-20 PROCEDURE — 2580000003 HC RX 258: Performed by: NURSE PRACTITIONER

## 2023-11-20 PROCEDURE — 36415 COLL VENOUS BLD VENIPUNCTURE: CPT

## 2023-11-20 PROCEDURE — 80048 BASIC METABOLIC PNL TOTAL CA: CPT

## 2023-11-20 PROCEDURE — 6360000002 HC RX W HCPCS: Performed by: NURSE PRACTITIONER

## 2023-11-20 RX ORDER — INSULIN LISPRO 100 [IU]/ML
0-4 INJECTION, SOLUTION INTRAVENOUS; SUBCUTANEOUS NIGHTLY
Status: DISCONTINUED | OUTPATIENT
Start: 2023-11-20 | End: 2023-11-20 | Stop reason: SDUPTHER

## 2023-11-20 RX ORDER — INSULIN LISPRO 100 [IU]/ML
0-8 INJECTION, SOLUTION INTRAVENOUS; SUBCUTANEOUS
Status: DISCONTINUED | OUTPATIENT
Start: 2023-11-20 | End: 2023-11-20 | Stop reason: SDUPTHER

## 2023-11-20 RX ADMIN — ACETAMINOPHEN 650 MG: 325 TABLET ORAL at 17:06

## 2023-11-20 RX ADMIN — DORZOLAMIDE HCL 1 DROP: 20 SOLUTION/ DROPS OPHTHALMIC at 21:00

## 2023-11-20 RX ADMIN — HYDRALAZINE HYDROCHLORIDE 75 MG: 50 TABLET, FILM COATED ORAL at 05:33

## 2023-11-20 RX ADMIN — INSULIN GLARGINE 9 UNITS: 100 INJECTION, SOLUTION SUBCUTANEOUS at 10:02

## 2023-11-20 RX ADMIN — HYDRALAZINE HYDROCHLORIDE 75 MG: 50 TABLET, FILM COATED ORAL at 20:59

## 2023-11-20 RX ADMIN — TIMOLOL MALEATE 1 DROP: 5 SOLUTION OPHTHALMIC at 10:03

## 2023-11-20 RX ADMIN — SODIUM CHLORIDE: 9 INJECTION, SOLUTION INTRAVENOUS at 17:15

## 2023-11-20 RX ADMIN — EMPAGLIFLOZIN 10 MG: 10 TABLET, FILM COATED ORAL at 10:01

## 2023-11-20 RX ADMIN — METRONIDAZOLE 500 MG: 500 INJECTION, SOLUTION INTRAVENOUS at 17:16

## 2023-11-20 RX ADMIN — INSULIN LISPRO 2 UNITS: 100 INJECTION, SOLUTION INTRAVENOUS; SUBCUTANEOUS at 10:02

## 2023-11-20 RX ADMIN — AMLODIPINE BESYLATE 10 MG: 10 TABLET ORAL at 10:01

## 2023-11-20 RX ADMIN — METRONIDAZOLE 500 MG: 500 INJECTION, SOLUTION INTRAVENOUS at 05:29

## 2023-11-20 RX ADMIN — ASPIRIN 81 MG: 81 TABLET, COATED ORAL at 10:00

## 2023-11-20 RX ADMIN — CARVEDILOL 3.12 MG: 3.12 TABLET, FILM COATED ORAL at 10:01

## 2023-11-20 RX ADMIN — TIMOLOL MALEATE 1 DROP: 5 SOLUTION OPHTHALMIC at 21:00

## 2023-11-20 RX ADMIN — ATORVASTATIN CALCIUM 40 MG: 40 TABLET, FILM COATED ORAL at 10:00

## 2023-11-20 RX ADMIN — HYDRALAZINE HYDROCHLORIDE 75 MG: 50 TABLET, FILM COATED ORAL at 17:07

## 2023-11-20 RX ADMIN — DORZOLAMIDE HCL 1 DROP: 20 SOLUTION/ DROPS OPHTHALMIC at 10:02

## 2023-11-20 RX ADMIN — CARVEDILOL 3.12 MG: 3.12 TABLET, FILM COATED ORAL at 17:07

## 2023-11-20 RX ADMIN — PREGABALIN 100 MG: 100 CAPSULE ORAL at 10:01

## 2023-11-20 RX ADMIN — ENOXAPARIN SODIUM 30 MG: 100 INJECTION SUBCUTANEOUS at 10:02

## 2023-11-20 RX ADMIN — CLOPIDOGREL BISULFATE 75 MG: 75 TABLET ORAL at 10:01

## 2023-11-20 RX ADMIN — CEFTRIAXONE SODIUM 1000 MG: 1 INJECTION, POWDER, FOR SOLUTION INTRAMUSCULAR; INTRAVENOUS at 20:04

## 2023-11-20 RX ADMIN — SODIUM CHLORIDE, PRESERVATIVE FREE 10 ML: 5 INJECTION INTRAVENOUS at 17:07

## 2023-11-20 NOTE — H&P
insulin sliding scale for now since patient is n.p.o. SUSANA on CKD stage III, creatinine 1.7 today, on IV fluids, continue to monitor    Recent stroke, patient on aspirin Plavix, continues to have some dizziness, EKG showed bigeminy and PACs, recently had stress test which was abnormal, cardiology has been consulted, if [facility] if she would need heart cath then we will consult neurology and neurology and nephrology for clearance      DVT prophylaxis. Consultations:   IP CONSULT TO INTERNAL MEDICINE  IP CONSULT TO CARDIOLOGY  IP CONSULT TO GENERAL SURGERY     Patient is admitted as inpatient status because of co-morbidities listed above, severity of signs and symptoms as outlined, requirement for current medical therapies and most importantly because of direct risk to patient if care not provided in a hospital setting. Griffin Keller MD  11/20/2023  10:02 AM    Copy sent to Dr. Claribel Olson MD    Please note that this chart was generated using voice recognition Dragon dictation software. Although every effort was made to ensure the accuracy of this automated transcription, some errors in transcription may have occurred.

## 2023-11-20 NOTE — ACP (ADVANCE CARE PLANNING)
Advance Care Planning     Advance Care Planning Activator (Inpatient)  Conversation Note      Date of ACP Conversation: 11/20/2023     Conversation Conducted with: Patient with Decision Making Capacity    ACP Activator: Sarahy Barreto RN        Health Care Decision Maker:     Current Designated Health Care Decision Maker:     Primary Decision Maker: Sharona Strickland Child - [phone]    Primary Decision Maker: Galdino Naqvi Child - [phone]  Click here to complete Healthcare Decision Makers including section of the Healthcare Decision Maker Relationship (ie \"Primary\")      Care Preferences    Ventilation: \"If you were in your present state of health and suddenly became very ill and were unable to breathe on your own, what would your preference be about the use of a ventilator (breathing machine) if it were available to you? \"      Would the patient desire the use of ventilator (breathing machine)?: no    \"If your health worsens and it becomes clear that your chance of recovery is unlikely, what would your preference be about the use of a ventilator (breathing machine) if it were available to you? \"     Would the patient desire the use of ventilator (breathing machine)?: No      Resuscitation  \"CPR works best to restart the heart when there is a sudden event, like a heart attack, in someone who is otherwise healthy. Unfortunately, CPR does not typically restart the heart for people who have serious health conditions or who are very sick. \"    \"In the event your heart stopped as a result of an underlying serious health condition, would you want attempts to be made to restart your heart (answer \"yes\" for attempt to resuscitate) or would you prefer a natural death (answer \"no\" for do not attempt to resuscitate)? \" no       [] Yes   [] No   Educated Patient / Armen Flor regarding differences between Advance Directives and portable DNR orders.     Length of ACP Conversation in minutes:      Jermaine Mckeon

## 2023-11-20 NOTE — PLAN OF CARE
Problem: Discharge Planning  Goal: Discharge to home or other facility with appropriate resources  Outcome: Progressing  Flowsheets (Taken 11/20/2023 0900)  Discharge to home or other facility with appropriate resources:   Identify barriers to discharge with patient and caregiver   Arrange for needed discharge resources and transportation as appropriate   Identify discharge learning needs (meds, wound care, etc)   Refer to discharge planning if patient needs post-hospital services based on physician order or complex needs related to functional status, cognitive ability or social support system     Problem: Pain  Goal: Verbalizes/displays adequate comfort level or baseline comfort level  Outcome: Progressing     Problem: Skin/Tissue Integrity  Goal: Absence of new skin breakdown  Description: 1. Monitor for areas of redness and/or skin breakdown  2. Assess vascular access sites hourly  3. Every 4-6 hours minimum:  Change oxygen saturation probe site  4. Every 4-6 hours:  If on nasal continuous positive airway pressure, respiratory therapy assess nares and determine need for appliance change or resting period.   Outcome: Progressing     Problem: Safety - Adult  Goal: Free from fall injury  Outcome: Progressing     Problem: ABCDS Injury Assessment  Goal: Absence of physical injury  Outcome: Progressing     Problem: Chronic Conditions and Co-morbidities  Goal: Patient's chronic conditions and co-morbidity symptoms are monitored and maintained or improved  Outcome: Progressing  Flowsheets (Taken 11/20/2023 0900)  Care Plan - Patient's Chronic Conditions and Co-Morbidity Symptoms are Monitored and Maintained or Improved:   Monitor and assess patient's chronic conditions and comorbid symptoms for stability, deterioration, or improvement   Collaborate with multidisciplinary team to address chronic and comorbid conditions and prevent exacerbation or deterioration   Update acute care plan with appropriate goals if

## 2023-11-20 NOTE — CARE COORDINATION
Case Management Assessment  Initial Evaluation    Date/Time of Evaluation: 11/20/2023 12:10 PM  Assessment Completed by: Jim Luu RN    If patient is discharged prior to next notation, then this note serves as note for discharge by case management. Patient Name: Venecai Cutler                   YOB: 1940  Diagnosis: Colitis [K52.9]                   Date / Time: 11/19/2023  4:34 PM    Patient Admission Status: Inpatient   Readmission Risk (Low < 19, Mod (19-27), High > 27): Readmission Risk Score: 20    Current PCP: Natasha Messer MD  PCP verified by CM? Yes    Chart Reviewed: Yes      History Provided by: Patient  Patient Orientation: Alert and Oriented    Patient Cognition: Alert    Hospitalization in the last 30 days (Readmission):  No    If yes, Readmission Assessment in  Navigator will be completed. Advance Directives:      Code Status: Full Code   Patient's Primary Decision Maker is: Legal Next of Kin    Primary Decision MakerBabeambrosio January - Child - [phone]    Primary Decision Maker: Nirav Naylor  Child - [phone]    Discharge Planning:    Patient lives with: Alone Type of Home: House  Primary Care Giver: Self  Patient Support Systems include: Family Members   Current Financial resources: Medicare  Current community resources: None  Current services prior to admission: None            Current DME:              Type of Home Care services:  None    ADLS  Prior functional level: Independent in ADLs/IADLs  Current functional level: Independent in ADLs/IADLs    PT AM-PAC:   /24  OT AM-PAC:   /24    Family can provide assistance at DC: Yes  Would you like Case Management to discuss the discharge plan with any other family members/significant others, and if so, who?  Yes  Plans to Return to Present Housing: Unknown at present  Other Identified Issues/Barriers to RETURNING to current housing: no  Potential Assistance needed at discharge: N/A            Potential DME:

## 2023-11-21 LAB
ANION GAP SERPL CALCULATED.3IONS-SCNC: 13 MMOL/L (ref 9–17)
BASOPHILS # BLD: 0 K/UL (ref 0–0.2)
BASOPHILS NFR BLD: 0 % (ref 0–2)
BUN SERPL-MCNC: 30 MG/DL (ref 8–23)
CALCIUM SERPL-MCNC: 8.5 MG/DL (ref 8.6–10.4)
CHLORIDE SERPL-SCNC: 107 MMOL/L (ref 98–107)
CO2 SERPL-SCNC: 23 MMOL/L (ref 20–31)
CREAT SERPL-MCNC: 1.5 MG/DL (ref 0.5–0.9)
EOSINOPHIL # BLD: 0.2 K/UL (ref 0–0.4)
EOSINOPHILS RELATIVE PERCENT: 2 % (ref 0–4)
ERYTHROCYTE [DISTWIDTH] IN BLOOD BY AUTOMATED COUNT: 13.6 % (ref 11.5–14.9)
GFR SERPL CREATININE-BSD FRML MDRD: 35 ML/MIN/1.73M2
GLUCOSE BLD-MCNC: 117 MG/DL (ref 65–105)
GLUCOSE BLD-MCNC: 127 MG/DL (ref 65–105)
GLUCOSE BLD-MCNC: 200 MG/DL (ref 65–105)
GLUCOSE BLD-MCNC: 239 MG/DL (ref 65–105)
GLUCOSE SERPL-MCNC: 129 MG/DL (ref 70–99)
HCT VFR BLD AUTO: 36.2 % (ref 36–46)
HGB BLD-MCNC: 12 G/DL (ref 12–16)
LYMPHOCYTES NFR BLD: 1.2 K/UL (ref 1–4.8)
LYMPHOCYTES RELATIVE PERCENT: 12 % (ref 24–44)
MAGNESIUM SERPL-MCNC: 2.5 MG/DL (ref 1.6–2.6)
MCH RBC QN AUTO: 30 PG (ref 26–34)
MCHC RBC AUTO-ENTMCNC: 33.2 G/DL (ref 31–37)
MCV RBC AUTO: 90.2 FL (ref 80–100)
MONOCYTES NFR BLD: 0.7 K/UL (ref 0.1–1.3)
MONOCYTES NFR BLD: 7 % (ref 1–7)
NEUTROPHILS NFR BLD: 79 % (ref 36–66)
NEUTS SEG NFR BLD: 8.1 K/UL (ref 1.3–9.1)
PLATELET # BLD AUTO: 243 K/UL (ref 150–450)
PMV BLD AUTO: 8.8 FL (ref 6–12)
POTASSIUM SERPL-SCNC: 3.4 MMOL/L (ref 3.7–5.3)
RBC # BLD AUTO: 4.01 M/UL (ref 4–5.2)
SODIUM SERPL-SCNC: 143 MMOL/L (ref 135–144)
WBC OTHER # BLD: 10.2 K/UL (ref 3.5–11)

## 2023-11-21 PROCEDURE — 6370000000 HC RX 637 (ALT 250 FOR IP): Performed by: NURSE PRACTITIONER

## 2023-11-21 PROCEDURE — 36415 COLL VENOUS BLD VENIPUNCTURE: CPT

## 2023-11-21 PROCEDURE — 6360000002 HC RX W HCPCS: Performed by: NURSE PRACTITIONER

## 2023-11-21 PROCEDURE — 99233 SBSQ HOSP IP/OBS HIGH 50: CPT | Performed by: SURGERY

## 2023-11-21 PROCEDURE — 2580000003 HC RX 258: Performed by: NURSE PRACTITIONER

## 2023-11-21 PROCEDURE — 99233 SBSQ HOSP IP/OBS HIGH 50: CPT | Performed by: INTERNAL MEDICINE

## 2023-11-21 PROCEDURE — 6370000000 HC RX 637 (ALT 250 FOR IP): Performed by: INTERNAL MEDICINE

## 2023-11-21 PROCEDURE — 82947 ASSAY GLUCOSE BLOOD QUANT: CPT

## 2023-11-21 PROCEDURE — 80048 BASIC METABOLIC PNL TOTAL CA: CPT

## 2023-11-21 PROCEDURE — 85025 COMPLETE CBC W/AUTO DIFF WBC: CPT

## 2023-11-21 PROCEDURE — 83735 ASSAY OF MAGNESIUM: CPT

## 2023-11-21 PROCEDURE — 2060000000 HC ICU INTERMEDIATE R&B

## 2023-11-21 RX ORDER — POTASSIUM CHLORIDE 20 MEQ/1
40 TABLET, EXTENDED RELEASE ORAL PRN
Status: DISCONTINUED | OUTPATIENT
Start: 2023-11-21 | End: 2023-11-21

## 2023-11-21 RX ORDER — POTASSIUM CHLORIDE 7.45 MG/ML
10 INJECTION INTRAVENOUS PRN
Status: DISCONTINUED | OUTPATIENT
Start: 2023-11-21 | End: 2023-11-21

## 2023-11-21 RX ORDER — POTASSIUM CHLORIDE 1.5 G/1.58G
40 POWDER, FOR SOLUTION ORAL ONCE
Status: COMPLETED | OUTPATIENT
Start: 2023-11-21 | End: 2023-11-21

## 2023-11-21 RX ADMIN — METRONIDAZOLE 500 MG: 500 INJECTION, SOLUTION INTRAVENOUS at 16:44

## 2023-11-21 RX ADMIN — CARVEDILOL 3.12 MG: 3.12 TABLET, FILM COATED ORAL at 09:44

## 2023-11-21 RX ADMIN — ASPIRIN 81 MG: 81 TABLET, COATED ORAL at 09:44

## 2023-11-21 RX ADMIN — CARVEDILOL 3.12 MG: 3.12 TABLET, FILM COATED ORAL at 18:22

## 2023-11-21 RX ADMIN — HYDRALAZINE HYDROCHLORIDE 75 MG: 50 TABLET, FILM COATED ORAL at 05:51

## 2023-11-21 RX ADMIN — HYDRALAZINE HYDROCHLORIDE 75 MG: 50 TABLET, FILM COATED ORAL at 13:16

## 2023-11-21 RX ADMIN — SODIUM CHLORIDE, PRESERVATIVE FREE 10 ML: 5 INJECTION INTRAVENOUS at 21:17

## 2023-11-21 RX ADMIN — DORZOLAMIDE HCL 1 DROP: 20 SOLUTION/ DROPS OPHTHALMIC at 21:19

## 2023-11-21 RX ADMIN — PREGABALIN 100 MG: 100 CAPSULE ORAL at 21:18

## 2023-11-21 RX ADMIN — METRONIDAZOLE 500 MG: 500 INJECTION, SOLUTION INTRAVENOUS at 02:16

## 2023-11-21 RX ADMIN — EMPAGLIFLOZIN 10 MG: 10 TABLET, FILM COATED ORAL at 09:44

## 2023-11-21 RX ADMIN — CLOPIDOGREL BISULFATE 75 MG: 75 TABLET ORAL at 09:44

## 2023-11-21 RX ADMIN — POTASSIUM CHLORIDE 40 MEQ: 1.5 POWDER, FOR SOLUTION ORAL at 13:18

## 2023-11-21 RX ADMIN — TIMOLOL MALEATE 1 DROP: 5 SOLUTION OPHTHALMIC at 09:46

## 2023-11-21 RX ADMIN — SODIUM CHLORIDE: 9 INJECTION, SOLUTION INTRAVENOUS at 05:53

## 2023-11-21 RX ADMIN — AMLODIPINE BESYLATE 10 MG: 10 TABLET ORAL at 09:44

## 2023-11-21 RX ADMIN — INSULIN LISPRO 2 UNITS: 100 INJECTION, SOLUTION INTRAVENOUS; SUBCUTANEOUS at 18:22

## 2023-11-21 RX ADMIN — SODIUM CHLORIDE: 9 INJECTION, SOLUTION INTRAVENOUS at 16:42

## 2023-11-21 RX ADMIN — DORZOLAMIDE HCL 1 DROP: 20 SOLUTION/ DROPS OPHTHALMIC at 09:46

## 2023-11-21 RX ADMIN — ENOXAPARIN SODIUM 30 MG: 100 INJECTION SUBCUTANEOUS at 09:48

## 2023-11-21 RX ADMIN — CEFTRIAXONE SODIUM 1000 MG: 1 INJECTION, POWDER, FOR SOLUTION INTRAMUSCULAR; INTRAVENOUS at 21:21

## 2023-11-21 RX ADMIN — ATORVASTATIN CALCIUM 40 MG: 40 TABLET, FILM COATED ORAL at 09:44

## 2023-11-21 RX ADMIN — TIMOLOL MALEATE 1 DROP: 5 SOLUTION OPHTHALMIC at 21:19

## 2023-11-21 RX ADMIN — METRONIDAZOLE 500 MG: 500 INJECTION, SOLUTION INTRAVENOUS at 09:53

## 2023-11-21 ASSESSMENT — PAIN SCALES - GENERAL: PAINLEVEL_OUTOF10: 0

## 2023-11-21 NOTE — CONSULTS
[facility]  CONSULT NOTE                  Date:   11/20/2023  Patient name: Kanwal Lee  Date of admission:  11/19/2023  4:34 PM  MRN:   [id number]  YOB: 1940    Reason for Admission: colitis     CHIEF COMPLAINT:   abdominal pain     History Obtained From:  Patient and chart review     HISTORY OF PRESENT ILLNESS:    53-year-old female with underlying multivessel CAD status post CABG in 2002 and PCI to RCA in 2012 admitted with complaints of sharp right lower quadrant abdominal pain associated with intermittent nausea and diarrhea. Currently being treated for acute colitis. Had recent hospitalization for subacute CVA. Evaluated by cardiology at that time, stress test was obtained with results as below. Cardiac cath was planned once patient recovered from CVA. Today patient denies any active chest pain. She does have dyspnea and fatigue on exertion. No orthopnea lower extremity edema. Past Medical History:   has a past medical history of Amputation of leg ([address]), CAD (coronary artery disease), CAD (coronary artery disease), Chronic back pain, Coughing, Depression, Diabetes, Diphtheria, Full dentures, GERD (gastroesophageal reflux disease), Glaucoma, Headache(784.0), History of blood transfusion, Hx of blood clots, Hyperlipidemia, Hypertension, IDDM (insulin dependent diabetes mellitus), Leg pain, bilateral, LONG TERM ANTICOAGULENT USE, Neuropathy, Numbness of toes, Obesity, Osteoarthritis, Peripheral vascular disease ([address]), Rash, skin, Type II or unspecified type diabetes mellitus without mention of complication, not stated as uncontrolled, Wears dentures, Wears glasses, and Wears glasses. Past Surgical History:   has a past surgical history that includes Bypass Graft (2008); Hysterectomy, total abdominal; Cataract removal (1980); Coronary artery bypass graft (2001); Coronary angioplasty; and Leg amputation through knee (Left).      Home Medications:    Prior to Admission
NEPHROLOGY CONSULT     Patient :  Crystal Gonzalez; 82 y.o. MRN# [id number]  Location:  2098/2098-01  Attending:  Alfredo Melo MD  Admit Date:  11/19/2023   Hospital Day: 2      Reason for Consult:        Chief Complaint: Fatigue abdominal pain  History Obtained From:  patient    History of Present Illness : This is a 82 y.o. female with past medical history of essential hypertension, coronary artery disease status post CABG and coronary artery stent placement in 2000 2013 history of type 2 diabetes mellitus, diabetic neuropathy, bilateral above-knee amputation, history of CVA with MRI brain showing  acute to subacute areas of infarct in right temporal lobe and right frontal parietal areas, CKD stage IIIa with baseline creatinine of 1.2 mg/dL. Patient presented to the hospital with complaints of abdominal pain right lower quadrant abdominal pain no nausea vomiting. CT abdomen pelvis showed colitis without perforation or abscess.   Labs showed serum creatinine of 1.4 mg/dL on admission 11/14/2023 serum creatinine increased to 1.7 mg/dL, serum creatinine today 1.5 mg/dL potassium 3.4      Past Medical History:        Diagnosis Date    Amputation of leg ([address])     left aka    CAD (coronary artery disease)     CAD (coronary artery disease)     Chronic back pain     Coughing     Depression     Diabetes     Diphtheria     Full dentures     GERD (gastroesophageal reflux disease) 10/10/2013    Glaucoma     Headache(784.0)     History of blood transfusion     Hx of blood clots     Hyperlipidemia     Hypertension     IDDM (insulin dependent diabetes mellitus)     Leg pain, bilateral     LONG TERM ANTICOAGULENT USE     Neuropathy     Numbness of toes     Obesity     Osteoarthritis     Peripheral vascular disease (HCC)     Rash, skin     lt arm    Type II or unspecified type diabetes mellitus without mention of complication, not stated as uncontrolled     Wears dentures     Wears glasses     Wears glasses        Past Surgical
soft  tissues demonstrate no acute abnormality. Impression  Punctate area of restricted diffusion in the posterior right temporal lobe  and in the high right frontal parietal white matter suggestive of acute to  subacute areas of infarct as discussed above. No other areas of restricted  diffusion are identified. Cerebral atrophy and mild chronic small vessel ischemic changes. Results for orders placed during the hospital encounter of 10/23/23    CT HEAD WO CONTRAST    Narrative  EXAMINATION:  CT OF THE HEAD WITHOUT CONTRAST  10/24/2023 9:16 am    TECHNIQUE:  CT of the head was performed without the administration of intravenous  contrast. Automated exposure control, iterative reconstruction, and/or weight  based adjustment of the mA/kV was utilized to reduce the radiation dose to as  low as reasonably achievable. COMPARISON:  10/23/2023    HISTORY:  ORDERING SYSTEM PROVIDED HISTORY: left arm drift      FINDINGS:  BRAIN/VENTRICLES:  No evidence of an acute infarct or other acute parenchymal  process. No evidence of acute intracranial hemorrhage. There is no evidence  of an intracranial mass or extraaxial fluid collection. No significant mass  effect or midline shift. There is mild generalized volume loss. Ventricular  enlargement concordant with the degree of parenchymal volume loss. Scattered  patchy foci of low attenuation are present within supratentorial white matter  which is a nonspecific finding but likely represents mild chronic  microvascular ischemia. ORBITS: The visualized portion of the orbits demonstrate no acute abnormality. SINUSES:  The visualized paranasal sinuses and mastoid air cells demonstrate  no acute abnormality. SOFT TISSUES/SKULL: No acute abnormality of the visualized skull or soft  tissues. Impression  No acute cortical infarct or other acute intracranial process.     The findings were [facility] the [facility] at 9:29  am on 10/24/2023
diet for now, will advance to soft/low fiber diet tomorrow  IV fluids as ordered  We will follow-up on lab work and reevaluate tomorrow  Continue medical management per admitting service    Thank you for this interesting consult and for allowing us to participate in the care of this patient. If you have any questions please don't hesitate to call.     Electronically signed by MIRA Jalloh CNP  on 11/21/2023 at 6:40 PM

## 2023-11-21 NOTE — CARE COORDINATION
ONGOING DISCHARGE PLAN:    Patient is alert and oriented x4. Spoke with patient regarding discharge plan and patient confirms that plan is still to return to her [facility]/ [facility], [phone]. Writer spoke to [facility], & he informed writer that pt. [facility] in [place]. Pt. Remains on IV Rocephin/Flagyl. Per Cardio notes- Cath as Outpt. Neuro on board. Nephro following, CR 1.5 from 1.7. Surgery consult, Cl liquid diet. Will continue to follow for additional discharge needs. If patient is discharged prior to next notation, then this note serves as note for discharge by case management.     Electronically signed by Joslyn Kirby RN on 11/21/2023 at 3:55 PM

## 2023-11-22 VITALS
WEIGHT: 153.44 LBS | HEIGHT: 55 IN | DIASTOLIC BLOOD PRESSURE: 46 MMHG | BODY MASS INDEX: 35.51 KG/M2 | HEART RATE: 73 BPM | OXYGEN SATURATION: 98 % | RESPIRATION RATE: 18 BRPM | TEMPERATURE: 97.5 F | SYSTOLIC BLOOD PRESSURE: 146 MMHG

## 2023-11-22 LAB
ANION GAP SERPL CALCULATED.3IONS-SCNC: 9 MMOL/L (ref 9–17)
BASOPHILS # BLD: 0 K/UL (ref 0–0.2)
BASOPHILS NFR BLD: 0 % (ref 0–2)
BUN SERPL-MCNC: 22 MG/DL (ref 8–23)
CALCIUM SERPL-MCNC: 8 MG/DL (ref 8.6–10.4)
CHLORIDE SERPL-SCNC: 112 MMOL/L (ref 98–107)
CO2 SERPL-SCNC: 22 MMOL/L (ref 20–31)
CREAT SERPL-MCNC: 1.3 MG/DL (ref 0.5–0.9)
EOSINOPHIL # BLD: 0.2 K/UL (ref 0–0.4)
EOSINOPHILS RELATIVE PERCENT: 2 % (ref 0–4)
ERYTHROCYTE [DISTWIDTH] IN BLOOD BY AUTOMATED COUNT: 13.8 % (ref 11.5–14.9)
GFR SERPL CREATININE-BSD FRML MDRD: 41 ML/MIN/1.73M2
GLUCOSE BLD-MCNC: 164 MG/DL (ref 65–105)
GLUCOSE BLD-MCNC: 228 MG/DL (ref 65–105)
GLUCOSE BLD-MCNC: 333 MG/DL (ref 65–105)
GLUCOSE SERPL-MCNC: 186 MG/DL (ref 70–99)
HCT VFR BLD AUTO: 33.3 % (ref 36–46)
HGB BLD-MCNC: 10.9 G/DL (ref 12–16)
LYMPHOCYTES NFR BLD: 1.3 K/UL (ref 1–4.8)
LYMPHOCYTES RELATIVE PERCENT: 17 % (ref 24–44)
MCH RBC QN AUTO: 29.5 PG (ref 26–34)
MCHC RBC AUTO-ENTMCNC: 32.7 G/DL (ref 31–37)
MCV RBC AUTO: 90.1 FL (ref 80–100)
MONOCYTES NFR BLD: 0.6 K/UL (ref 0.1–1.3)
MONOCYTES NFR BLD: 8 % (ref 1–7)
NEUTROPHILS NFR BLD: 73 % (ref 36–66)
NEUTS SEG NFR BLD: 5.8 K/UL (ref 1.3–9.1)
PLATELET # BLD AUTO: 226 K/UL (ref 150–450)
PMV BLD AUTO: 8.9 FL (ref 6–12)
POTASSIUM SERPL-SCNC: 3.7 MMOL/L (ref 3.7–5.3)
RBC # BLD AUTO: 3.7 M/UL (ref 4–5.2)
SODIUM SERPL-SCNC: 143 MMOL/L (ref 135–144)
WBC OTHER # BLD: 7.9 K/UL (ref 3.5–11)

## 2023-11-22 PROCEDURE — 6370000000 HC RX 637 (ALT 250 FOR IP): Performed by: NURSE PRACTITIONER

## 2023-11-22 PROCEDURE — 80048 BASIC METABOLIC PNL TOTAL CA: CPT

## 2023-11-22 PROCEDURE — 99233 SBSQ HOSP IP/OBS HIGH 50: CPT | Performed by: NURSE PRACTITIONER

## 2023-11-22 PROCEDURE — 2580000003 HC RX 258: Performed by: NURSE PRACTITIONER

## 2023-11-22 PROCEDURE — 6360000002 HC RX W HCPCS: Performed by: NURSE PRACTITIONER

## 2023-11-22 PROCEDURE — 82947 ASSAY GLUCOSE BLOOD QUANT: CPT

## 2023-11-22 PROCEDURE — 36415 COLL VENOUS BLD VENIPUNCTURE: CPT

## 2023-11-22 PROCEDURE — 2580000003 HC RX 258: Performed by: INTERNAL MEDICINE

## 2023-11-22 PROCEDURE — 85025 COMPLETE CBC W/AUTO DIFF WBC: CPT

## 2023-11-22 RX ORDER — METRONIDAZOLE 500 MG/1
500 TABLET ORAL 3 TIMES DAILY
Qty: 21 TABLET | Refills: 0 | Status: SHIPPED | OUTPATIENT
Start: 2023-11-22 | End: 2023-11-29

## 2023-11-22 RX ORDER — SODIUM CHLORIDE 9 MG/ML
INJECTION, SOLUTION INTRAVENOUS CONTINUOUS
Status: DISCONTINUED | OUTPATIENT
Start: 2023-11-22 | End: 2023-11-22 | Stop reason: HOSPADM

## 2023-11-22 RX ORDER — LOSARTAN POTASSIUM 25 MG/1
25 TABLET ORAL DAILY
Status: DISCONTINUED | OUTPATIENT
Start: 2023-11-23 | End: 2023-11-22 | Stop reason: HOSPADM

## 2023-11-22 RX ORDER — CIPROFLOXACIN 500 MG/1
500 TABLET, FILM COATED ORAL 2 TIMES DAILY
Qty: 14 TABLET | Refills: 0 | Status: SHIPPED | OUTPATIENT
Start: 2023-11-22 | End: 2023-11-29

## 2023-11-22 RX ORDER — CARVEDILOL 6.25 MG/1
6.25 TABLET ORAL 2 TIMES DAILY WITH MEALS
Status: DISCONTINUED | OUTPATIENT
Start: 2023-11-22 | End: 2023-11-22 | Stop reason: HOSPADM

## 2023-11-22 RX ADMIN — HYDRALAZINE HYDROCHLORIDE 75 MG: 50 TABLET, FILM COATED ORAL at 06:38

## 2023-11-22 RX ADMIN — METRONIDAZOLE 500 MG: 500 INJECTION, SOLUTION INTRAVENOUS at 01:28

## 2023-11-22 RX ADMIN — INSULIN GLARGINE 9 UNITS: 100 INJECTION, SOLUTION SUBCUTANEOUS at 08:20

## 2023-11-22 RX ADMIN — DORZOLAMIDE HCL 1 DROP: 20 SOLUTION/ DROPS OPHTHALMIC at 08:19

## 2023-11-22 RX ADMIN — PREGABALIN 100 MG: 100 CAPSULE ORAL at 08:19

## 2023-11-22 RX ADMIN — AMLODIPINE BESYLATE 10 MG: 10 TABLET ORAL at 08:19

## 2023-11-22 RX ADMIN — TIMOLOL MALEATE 1 DROP: 5 SOLUTION OPHTHALMIC at 08:19

## 2023-11-22 RX ADMIN — CLOPIDOGREL BISULFATE 75 MG: 75 TABLET ORAL at 08:19

## 2023-11-22 RX ADMIN — INSULIN LISPRO 6 UNITS: 100 INJECTION, SOLUTION INTRAVENOUS; SUBCUTANEOUS at 12:20

## 2023-11-22 RX ADMIN — HYDRALAZINE HYDROCHLORIDE 75 MG: 50 TABLET, FILM COATED ORAL at 15:00

## 2023-11-22 RX ADMIN — EMPAGLIFLOZIN 10 MG: 10 TABLET, FILM COATED ORAL at 08:19

## 2023-11-22 RX ADMIN — SODIUM CHLORIDE: 9 INJECTION, SOLUTION INTRAVENOUS at 14:59

## 2023-11-22 RX ADMIN — METRONIDAZOLE 500 MG: 500 INJECTION, SOLUTION INTRAVENOUS at 08:16

## 2023-11-22 RX ADMIN — ENOXAPARIN SODIUM 30 MG: 100 INJECTION SUBCUTANEOUS at 08:20

## 2023-11-22 RX ADMIN — CARVEDILOL 3.12 MG: 3.12 TABLET, FILM COATED ORAL at 08:19

## 2023-11-22 RX ADMIN — SODIUM CHLORIDE, PRESERVATIVE FREE 10 ML: 5 INJECTION INTRAVENOUS at 08:30

## 2023-11-22 RX ADMIN — ATORVASTATIN CALCIUM 40 MG: 40 TABLET, FILM COATED ORAL at 08:19

## 2023-11-22 RX ADMIN — ASPIRIN 81 MG: 81 TABLET, COATED ORAL at 08:19

## 2023-11-22 ASSESSMENT — PAIN SCALES - GENERAL: PAINLEVEL_OUTOF10: 0

## 2023-11-22 NOTE — DISCHARGE INSTR - DIET

## 2023-11-22 NOTE — DISCHARGE SUMMARY
[facility] Internal Medicine    Discharge Summary     Patient ID: Diego Hernadez  :  1940   MRN: [id number]     ACCOUNT:  [de-identified]   Patient's PCP: Jimmie Stokes MD  Admit Date: 2023   Discharge Date: 2023    Length of Stay: 3  Code Status:  Full Code  Admitting Physician: Yisel Garcia MD  Discharge Physician: Yisel Garcia MD     Active Discharge Diagnoses:     Primary Problem  [address] Problems    Diagnosis Date Noted    Hx of arterial ischemic stroke [Z86.73] 2023    Stenosis of right internal carotid artery [I65.21] 2023    Colitis [K52.9] 2023    Abnormal stress test [R94.39] 10/26/2023       Admission Condition:  fair     Discharged Condition: fair    Hospital Stay:     Hospital Course:  Diego Hernadez is a 82 y.o. female who was admitted for the management of Colitis , presented to ER with Fatigue    The patient is a 82 y.o. Non- / non  female who presents with Fatigue   and she is admitted to the hospital for the management of    Diego Hernadez is a 82 y.o. Non- / non  female who presents with Fatigue   and is admitted to the hospital for the management of Colitis. According to patient, she developed constant, sharp, RLQ abdominal pain earlier today. Symptoms are associated with headache, intermittent nausea, and 3-4 bouts of liquid diarrhea. She has tenderness on her right abdomen. Denies fever, chills, chest pain, cough, vomiting, and urinary symptoms. There are no aggravating or alleviating factors.   Symptoms are reported as constant and moderate to severe; headache and diarrhea currently resolved and abdominal pain improved since coming to the hospital.       Patient has multiple comorbidities, was recently admitted with dizziness found to have subacute stroke, was evaluated by audiology vascular neurology, stress test was positive, found to have severe

## 2023-11-22 NOTE — CARE COORDINATION
ONGOING DISCHARGE PLAN:    Patient is alert and oriented x4. Spoke with patient regarding discharge plan and patient confirms that plan is still to discharge to home    Cre improved    Resume losartan    Blood pressure controlled    Advance diet     Will continue to follow for additional discharge needs. If patient is discharged prior to next notation, then this note serves as note for discharge by case management.     Electronically signed by Jim Luu RN on 11/22/2023 at 11:54 AM

## 2023-11-23 PROBLEM — R79.89 ELEVATED TROPONIN: Status: RESOLVED | Noted: 2023-10-24 | Resolved: 2023-11-23

## 2024-02-05 RX ORDER — GLIPIZIDE 5 MG/1
TABLET ORAL
Qty: 60 TABLET | Refills: 3 | OUTPATIENT
Start: 2024-02-05